# Patient Record
Sex: FEMALE | Race: WHITE | Employment: PART TIME | URBAN - METROPOLITAN AREA
[De-identification: names, ages, dates, MRNs, and addresses within clinical notes are randomized per-mention and may not be internally consistent; named-entity substitution may affect disease eponyms.]

---

## 2017-01-03 ENCOUNTER — OFFICE VISIT (OUTPATIENT)
Dept: FAMILY MEDICINE CLINIC | Age: 62
End: 2017-01-03

## 2017-01-03 VITALS
DIASTOLIC BLOOD PRESSURE: 76 MMHG | TEMPERATURE: 97.6 F | BODY MASS INDEX: 27.42 KG/M2 | HEIGHT: 62 IN | WEIGHT: 149 LBS | SYSTOLIC BLOOD PRESSURE: 122 MMHG | HEART RATE: 66 BPM

## 2017-01-03 DIAGNOSIS — J01.80 OTHER ACUTE SINUSITIS: Primary | ICD-10-CM

## 2017-01-03 DIAGNOSIS — J20.9 ACUTE BRONCHITIS, UNSPECIFIED ORGANISM: ICD-10-CM

## 2017-01-03 PROCEDURE — 99213 OFFICE O/P EST LOW 20 MIN: CPT | Performed by: FAMILY MEDICINE

## 2017-01-03 RX ORDER — DEXTROMETHORPHAN HYDROBROMIDE AND PROMETHAZINE HYDROCHLORIDE 15; 6.25 MG/5ML; MG/5ML
5 SYRUP ORAL 4 TIMES DAILY PRN
Qty: 150 ML | Refills: 0 | Status: SHIPPED | OUTPATIENT
Start: 2017-01-03 | End: 2017-01-10

## 2017-01-03 RX ORDER — LEVOFLOXACIN 500 MG/1
500 TABLET, FILM COATED ORAL DAILY
Qty: 10 TABLET | Refills: 0 | Status: SHIPPED | OUTPATIENT
Start: 2017-01-03 | End: 2017-01-13

## 2017-01-03 ASSESSMENT — ENCOUNTER SYMPTOMS: ABDOMINAL PAIN: 0

## 2017-03-06 ENCOUNTER — OFFICE VISIT (OUTPATIENT)
Dept: FAMILY MEDICINE CLINIC | Age: 62
End: 2017-03-06

## 2017-03-06 VITALS
OXYGEN SATURATION: 98 % | RESPIRATION RATE: 20 BRPM | TEMPERATURE: 97.8 F | DIASTOLIC BLOOD PRESSURE: 80 MMHG | HEIGHT: 62 IN | BODY MASS INDEX: 27.42 KG/M2 | HEART RATE: 69 BPM | SYSTOLIC BLOOD PRESSURE: 122 MMHG | WEIGHT: 149 LBS

## 2017-03-06 DIAGNOSIS — R10.9 RIGHT FLANK PAIN: ICD-10-CM

## 2017-03-06 DIAGNOSIS — R07.89 RIGHT-SIDED CHEST WALL PAIN: Primary | ICD-10-CM

## 2017-03-06 LAB
BILIRUBIN, POC: NORMAL
BLOOD URINE, POC: NORMAL
CLARITY, POC: CLEAR
COLOR, POC: NORMAL
GLUCOSE URINE, POC: NORMAL
KETONES, POC: NORMAL
LEUKOCYTE EST, POC: NORMAL
NITRITE, POC: NORMAL
PH, POC: 6
PROTEIN, POC: NORMAL
SPECIFIC GRAVITY, POC: 1.02
UROBILINOGEN, POC: NORMAL

## 2017-03-06 PROCEDURE — 3017F COLORECTAL CA SCREEN DOC REV: CPT | Performed by: FAMILY MEDICINE

## 2017-03-06 PROCEDURE — 4004F PT TOBACCO SCREEN RCVD TLK: CPT | Performed by: FAMILY MEDICINE

## 2017-03-06 PROCEDURE — G8484 FLU IMMUNIZE NO ADMIN: HCPCS | Performed by: FAMILY MEDICINE

## 2017-03-06 PROCEDURE — 81002 URINALYSIS NONAUTO W/O SCOPE: CPT | Performed by: FAMILY MEDICINE

## 2017-03-06 PROCEDURE — 99213 OFFICE O/P EST LOW 20 MIN: CPT | Performed by: FAMILY MEDICINE

## 2017-03-06 PROCEDURE — G8419 CALC BMI OUT NRM PARAM NOF/U: HCPCS | Performed by: FAMILY MEDICINE

## 2017-03-06 PROCEDURE — 3014F SCREEN MAMMO DOC REV: CPT | Performed by: FAMILY MEDICINE

## 2017-03-06 PROCEDURE — G8427 DOCREV CUR MEDS BY ELIG CLIN: HCPCS | Performed by: FAMILY MEDICINE

## 2017-03-06 RX ORDER — TRAMADOL HYDROCHLORIDE 50 MG/1
50 TABLET ORAL EVERY 6 HOURS PRN
Qty: 28 TABLET | Refills: 0 | Status: SHIPPED | OUTPATIENT
Start: 2017-03-06 | End: 2017-03-13

## 2017-03-06 RX ORDER — NAPROXEN 500 MG/1
500 TABLET ORAL 2 TIMES DAILY PRN
COMMUNITY
End: 2019-07-17

## 2017-03-06 ASSESSMENT — ENCOUNTER SYMPTOMS: ABDOMINAL PAIN: 0

## 2017-05-25 ENCOUNTER — TELEPHONE (OUTPATIENT)
Dept: FAMILY MEDICINE CLINIC | Age: 62
End: 2017-05-25

## 2017-05-25 DIAGNOSIS — Z12.31 ENCOUNTER FOR SCREENING MAMMOGRAM FOR BREAST CANCER: Primary | ICD-10-CM

## 2017-06-21 ENCOUNTER — HOSPITAL ENCOUNTER (OUTPATIENT)
Dept: WOMENS IMAGING | Age: 62
Discharge: HOME | End: 2017-06-21
Payer: COMMERCIAL

## 2017-06-21 DIAGNOSIS — Z12.31 ENCOUNTER FOR SCREENING MAMMOGRAM FOR BREAST CANCER: ICD-10-CM

## 2017-06-21 PROCEDURE — G0202 SCR MAMMO BI INCL CAD: HCPCS

## 2017-07-10 RX ORDER — LEVOTHYROXINE SODIUM 0.1 MG/1
TABLET ORAL
Qty: 30 TABLET | Refills: 0 | Status: SHIPPED | OUTPATIENT
Start: 2017-07-10 | End: 2017-08-16 | Stop reason: SDUPTHER

## 2017-08-15 RX ORDER — SERTRALINE HYDROCHLORIDE 100 MG/1
TABLET, FILM COATED ORAL
Qty: 30 TABLET | Refills: 0 | Status: SHIPPED | OUTPATIENT
Start: 2017-08-15 | End: 2017-10-02

## 2017-08-16 ENCOUNTER — OFFICE VISIT (OUTPATIENT)
Dept: FAMILY MEDICINE CLINIC | Age: 62
End: 2017-08-16

## 2017-08-16 VITALS
TEMPERATURE: 98 F | BODY MASS INDEX: 25.58 KG/M2 | SYSTOLIC BLOOD PRESSURE: 126 MMHG | DIASTOLIC BLOOD PRESSURE: 70 MMHG | HEART RATE: 82 BPM | HEIGHT: 62 IN | WEIGHT: 139 LBS

## 2017-08-16 DIAGNOSIS — F32.A DEPRESSION, UNSPECIFIED DEPRESSION TYPE: Primary | ICD-10-CM

## 2017-08-16 DIAGNOSIS — E78.2 MIXED HYPERLIPIDEMIA: ICD-10-CM

## 2017-08-16 DIAGNOSIS — E03.9 ACQUIRED HYPOTHYROIDISM: ICD-10-CM

## 2017-08-16 PROCEDURE — G8419 CALC BMI OUT NRM PARAM NOF/U: HCPCS | Performed by: FAMILY MEDICINE

## 2017-08-16 PROCEDURE — 3017F COLORECTAL CA SCREEN DOC REV: CPT | Performed by: FAMILY MEDICINE

## 2017-08-16 PROCEDURE — G8427 DOCREV CUR MEDS BY ELIG CLIN: HCPCS | Performed by: FAMILY MEDICINE

## 2017-08-16 PROCEDURE — 3014F SCREEN MAMMO DOC REV: CPT | Performed by: FAMILY MEDICINE

## 2017-08-16 PROCEDURE — 4004F PT TOBACCO SCREEN RCVD TLK: CPT | Performed by: FAMILY MEDICINE

## 2017-08-16 PROCEDURE — 99214 OFFICE O/P EST MOD 30 MIN: CPT | Performed by: FAMILY MEDICINE

## 2017-08-16 RX ORDER — TRAZODONE HYDROCHLORIDE 50 MG/1
TABLET ORAL
Qty: 30 TABLET | Refills: 2 | Status: SHIPPED | OUTPATIENT
Start: 2017-08-16 | End: 2017-12-08 | Stop reason: SDUPTHER

## 2017-08-16 RX ORDER — LEVOTHYROXINE SODIUM 0.1 MG/1
TABLET ORAL
Qty: 30 TABLET | Refills: 2 | Status: SHIPPED | OUTPATIENT
Start: 2017-08-16 | End: 2017-11-12 | Stop reason: SDUPTHER

## 2017-08-16 ASSESSMENT — ENCOUNTER SYMPTOMS
SHORTNESS OF BREATH: 0
ABDOMINAL PAIN: 0

## 2017-09-21 ENCOUNTER — HOSPITAL ENCOUNTER (EMERGENCY)
Age: 62
Discharge: HOME | End: 2017-09-21
Payer: COMMERCIAL

## 2017-09-21 VITALS
HEIGHT: 63 IN | TEMPERATURE: 98.2 F | OXYGEN SATURATION: 99 % | WEIGHT: 142 LBS | BODY MASS INDEX: 25.16 KG/M2 | SYSTOLIC BLOOD PRESSURE: 125 MMHG | RESPIRATION RATE: 18 BRPM | DIASTOLIC BLOOD PRESSURE: 81 MMHG | HEART RATE: 69 BPM

## 2017-09-21 DIAGNOSIS — Z91.030 BEE STING ALLERGY: Primary | ICD-10-CM

## 2017-09-21 PROCEDURE — 2500000003 HC RX 250 WO HCPCS: Performed by: PHYSICIAN ASSISTANT

## 2017-09-21 PROCEDURE — 99283 EMERGENCY DEPT VISIT LOW MDM: CPT

## 2017-09-21 PROCEDURE — 6370000000 HC RX 637 (ALT 250 FOR IP): Performed by: PHYSICIAN ASSISTANT

## 2017-09-21 PROCEDURE — 96374 THER/PROPH/DIAG INJ IV PUSH: CPT

## 2017-09-21 PROCEDURE — 6360000002 HC RX W HCPCS: Performed by: PHYSICIAN ASSISTANT

## 2017-09-21 PROCEDURE — 96375 TX/PRO/DX INJ NEW DRUG ADDON: CPT

## 2017-09-21 PROCEDURE — 2580000003 HC RX 258: Performed by: PHYSICIAN ASSISTANT

## 2017-09-21 PROCEDURE — S0028 INJECTION, FAMOTIDINE, 20 MG: HCPCS | Performed by: PHYSICIAN ASSISTANT

## 2017-09-21 RX ORDER — SODIUM CHLORIDE 9 MG/ML
INJECTION, SOLUTION INTRAVENOUS CONTINUOUS
Status: DISCONTINUED | OUTPATIENT
Start: 2017-09-21 | End: 2017-09-21 | Stop reason: HOSPADM

## 2017-09-21 RX ORDER — DIPHENHYDRAMINE HYDROCHLORIDE 50 MG/ML
25 INJECTION INTRAMUSCULAR; INTRAVENOUS ONCE
Status: COMPLETED | OUTPATIENT
Start: 2017-09-21 | End: 2017-09-21

## 2017-09-21 RX ORDER — METHYLPREDNISOLONE SODIUM SUCCINATE 125 MG/2ML
125 INJECTION, POWDER, LYOPHILIZED, FOR SOLUTION INTRAMUSCULAR; INTRAVENOUS ONCE
Status: COMPLETED | OUTPATIENT
Start: 2017-09-21 | End: 2017-09-21

## 2017-09-21 RX ORDER — LORATADINE 10 MG/1
10 TABLET ORAL DAILY
Qty: 5 TABLET | Refills: 0 | Status: SHIPPED | OUTPATIENT
Start: 2017-09-21 | End: 2017-09-26

## 2017-09-21 RX ORDER — PREDNISONE 20 MG/1
20 TABLET ORAL DAILY
Qty: 5 TABLET | Refills: 0 | Status: SHIPPED | OUTPATIENT
Start: 2017-09-21 | End: 2017-09-26

## 2017-09-21 RX ORDER — CETIRIZINE HYDROCHLORIDE 10 MG/1
10 TABLET ORAL ONCE
Status: COMPLETED | OUTPATIENT
Start: 2017-09-21 | End: 2017-09-21

## 2017-09-21 RX ORDER — FAMOTIDINE 20 MG/1
20 TABLET, FILM COATED ORAL 2 TIMES DAILY
Qty: 5 TABLET | Refills: 0 | Status: SHIPPED | OUTPATIENT
Start: 2017-09-21 | End: 2018-02-06

## 2017-09-21 RX ADMIN — METHYLPREDNISOLONE SODIUM SUCCINATE 125 MG: 125 INJECTION, POWDER, FOR SOLUTION INTRAMUSCULAR; INTRAVENOUS at 15:31

## 2017-09-21 RX ADMIN — CETIRIZINE HYDROCHLORIDE 10 MG: 10 TABLET, FILM COATED ORAL at 17:04

## 2017-09-21 RX ADMIN — FAMOTIDINE 20 MG: 10 INJECTION, SOLUTION INTRAVENOUS at 15:31

## 2017-09-21 RX ADMIN — DIPHENHYDRAMINE HYDROCHLORIDE 25 MG: 50 INJECTION, SOLUTION INTRAMUSCULAR; INTRAVENOUS at 15:31

## 2017-09-21 RX ADMIN — SODIUM CHLORIDE: 9 INJECTION, SOLUTION INTRAVENOUS at 15:31

## 2017-09-21 ASSESSMENT — PAIN SCALES - GENERAL: PAINLEVEL_OUTOF10: 6

## 2017-09-21 ASSESSMENT — PAIN DESCRIPTION - DESCRIPTORS: DESCRIPTORS: ACHING

## 2017-09-21 ASSESSMENT — ENCOUNTER SYMPTOMS
RHINORRHEA: 0
NAUSEA: 0
EYE DISCHARGE: 0
CONSTIPATION: 0
DIARRHEA: 0
COLOR CHANGE: 0
SORE THROAT: 0
STRIDOR: 0
COUGH: 0
CHOKING: 0
VOMITING: 0
ABDOMINAL PAIN: 0
ABDOMINAL DISTENTION: 0
WHEEZING: 0
SHORTNESS OF BREATH: 0
BACK PAIN: 0

## 2017-09-21 ASSESSMENT — PAIN DESCRIPTION - FREQUENCY: FREQUENCY: CONTINUOUS

## 2017-09-21 ASSESSMENT — PAIN DESCRIPTION - ORIENTATION: ORIENTATION: OTHER (COMMENT)

## 2017-10-02 RX ORDER — SERTRALINE HYDROCHLORIDE 100 MG/1
TABLET, FILM COATED ORAL
Qty: 30 TABLET | Refills: 0 | Status: SHIPPED | OUTPATIENT
Start: 2017-10-02 | End: 2017-11-01 | Stop reason: SDUPTHER

## 2017-10-10 RX ORDER — ATORVASTATIN CALCIUM 10 MG/1
TABLET, FILM COATED ORAL
Qty: 30 TABLET | Refills: 0 | Status: SHIPPED | OUTPATIENT
Start: 2017-10-10 | End: 2017-12-08 | Stop reason: SDUPTHER

## 2017-11-01 RX ORDER — SERTRALINE HYDROCHLORIDE 100 MG/1
TABLET, FILM COATED ORAL
Qty: 30 TABLET | Refills: 0 | Status: SHIPPED | OUTPATIENT
Start: 2017-11-01 | End: 2017-12-08 | Stop reason: SDUPTHER

## 2017-12-08 RX ORDER — ATORVASTATIN CALCIUM 10 MG/1
TABLET, FILM COATED ORAL
Qty: 30 TABLET | Refills: 0 | Status: SHIPPED | OUTPATIENT
Start: 2017-12-08 | End: 2018-01-11 | Stop reason: SDUPTHER

## 2017-12-08 RX ORDER — SERTRALINE HYDROCHLORIDE 100 MG/1
TABLET, FILM COATED ORAL
Qty: 30 TABLET | Refills: 0 | Status: SHIPPED | OUTPATIENT
Start: 2017-12-08 | End: 2017-12-28 | Stop reason: SDUPTHER

## 2017-12-08 RX ORDER — TRAZODONE HYDROCHLORIDE 50 MG/1
TABLET ORAL
Qty: 30 TABLET | Refills: 2 | Status: SHIPPED | OUTPATIENT
Start: 2017-12-08 | End: 2018-02-06 | Stop reason: SDUPTHER

## 2017-12-08 RX ORDER — LEVOTHYROXINE SODIUM 0.1 MG/1
TABLET ORAL
Qty: 30 TABLET | Refills: 0 | Status: SHIPPED | OUTPATIENT
Start: 2017-12-08 | End: 2018-02-05 | Stop reason: SDUPTHER

## 2017-12-18 DIAGNOSIS — E03.9 ACQUIRED HYPOTHYROIDISM: ICD-10-CM

## 2017-12-18 DIAGNOSIS — E78.2 MIXED HYPERLIPIDEMIA: ICD-10-CM

## 2017-12-18 LAB
ALBUMIN SERPL-MCNC: 4.4 G/DL (ref 3.9–4.9)
ALP BLD-CCNC: 80 U/L (ref 40–130)
ALT SERPL-CCNC: 14 U/L (ref 0–33)
ANION GAP SERPL CALCULATED.3IONS-SCNC: 17 MEQ/L (ref 7–13)
AST SERPL-CCNC: 15 U/L (ref 0–35)
BASOPHILS ABSOLUTE: 0 K/UL (ref 0–0.2)
BASOPHILS RELATIVE PERCENT: 0.5 %
BILIRUB SERPL-MCNC: 0.4 MG/DL (ref 0–1.2)
BUN BLDV-MCNC: 14 MG/DL (ref 8–23)
CALCIUM SERPL-MCNC: 9 MG/DL (ref 8.6–10.2)
CHLORIDE BLD-SCNC: 102 MEQ/L (ref 98–107)
CHOLESTEROL, TOTAL: 173 MG/DL (ref 0–199)
CO2: 25 MEQ/L (ref 22–29)
CREAT SERPL-MCNC: 0.54 MG/DL (ref 0.5–0.9)
EOSINOPHILS ABSOLUTE: 0 K/UL (ref 0–0.7)
EOSINOPHILS RELATIVE PERCENT: 0.4 %
GFR AFRICAN AMERICAN: >60
GFR NON-AFRICAN AMERICAN: >60
GLOBULIN: 2.2 G/DL (ref 2.3–3.5)
GLUCOSE BLD-MCNC: 71 MG/DL (ref 74–109)
HCT VFR BLD CALC: 40.1 % (ref 37–47)
HDLC SERPL-MCNC: 53 MG/DL (ref 40–59)
HEMOGLOBIN: 13.3 G/DL (ref 12–16)
LDL CHOLESTEROL CALCULATED: 99 MG/DL (ref 0–129)
LYMPHOCYTES ABSOLUTE: 2.9 K/UL (ref 1–4.8)
LYMPHOCYTES RELATIVE PERCENT: 37.9 %
MCH RBC QN AUTO: 32.5 PG (ref 27–31.3)
MCHC RBC AUTO-ENTMCNC: 33.3 % (ref 33–37)
MCV RBC AUTO: 97.7 FL (ref 82–100)
MONOCYTES ABSOLUTE: 0.4 K/UL (ref 0.2–0.8)
MONOCYTES RELATIVE PERCENT: 4.9 %
NEUTROPHILS ABSOLUTE: 4.3 K/UL (ref 1.4–6.5)
NEUTROPHILS RELATIVE PERCENT: 56.3 %
PDW BLD-RTO: 13.8 % (ref 11.5–14.5)
PLATELET # BLD: 226 K/UL (ref 130–400)
POTASSIUM SERPL-SCNC: 4.2 MEQ/L (ref 3.5–5.1)
RBC # BLD: 4.1 M/UL (ref 4.2–5.4)
SODIUM BLD-SCNC: 144 MEQ/L (ref 132–144)
TOTAL PROTEIN: 6.6 G/DL (ref 6.4–8.1)
TRIGL SERPL-MCNC: 105 MG/DL (ref 0–200)
TSH SERPL DL<=0.05 MIU/L-ACNC: 0.36 UIU/ML (ref 0.27–4.2)
WBC # BLD: 7.7 K/UL (ref 4.8–10.8)

## 2018-01-12 RX ORDER — ATORVASTATIN CALCIUM 10 MG/1
TABLET, FILM COATED ORAL
Qty: 30 TABLET | Refills: 0 | Status: SHIPPED | OUTPATIENT
Start: 2018-01-12 | End: 2018-02-06 | Stop reason: SDUPTHER

## 2018-02-05 RX ORDER — LEVOTHYROXINE SODIUM 0.1 MG/1
TABLET ORAL
Qty: 30 TABLET | Refills: 0 | Status: SHIPPED | OUTPATIENT
Start: 2018-02-05 | End: 2018-02-06 | Stop reason: SDUPTHER

## 2018-02-05 NOTE — TELEPHONE ENCOUNTER
Patient kind of upset that you have refused her medication yet again and no one has called to notify her that she needed an appt she had her lab work done in December but has heard nothing now she has been out of her Levothyroxine and Sertraline for over a week  I was able to schedule her an appt for tomorrow 2/6/18 but she was wondering if you could send her mediation to the pharmacy please advise

## 2018-02-06 ENCOUNTER — OFFICE VISIT (OUTPATIENT)
Dept: FAMILY MEDICINE CLINIC | Age: 63
End: 2018-02-06
Payer: COMMERCIAL

## 2018-02-06 VITALS
RESPIRATION RATE: 18 BRPM | HEART RATE: 95 BPM | OXYGEN SATURATION: 98 % | HEIGHT: 63 IN | DIASTOLIC BLOOD PRESSURE: 72 MMHG | SYSTOLIC BLOOD PRESSURE: 118 MMHG | TEMPERATURE: 98.5 F | WEIGHT: 146.6 LBS | BODY MASS INDEX: 25.98 KG/M2

## 2018-02-06 DIAGNOSIS — E78.2 MIXED HYPERLIPIDEMIA: ICD-10-CM

## 2018-02-06 DIAGNOSIS — F32.A DEPRESSION, UNSPECIFIED DEPRESSION TYPE: Primary | ICD-10-CM

## 2018-02-06 DIAGNOSIS — E03.9 HYPOTHYROIDISM, UNSPECIFIED TYPE: ICD-10-CM

## 2018-02-06 PROCEDURE — 4004F PT TOBACCO SCREEN RCVD TLK: CPT | Performed by: FAMILY MEDICINE

## 2018-02-06 PROCEDURE — 3017F COLORECTAL CA SCREEN DOC REV: CPT | Performed by: FAMILY MEDICINE

## 2018-02-06 PROCEDURE — G8484 FLU IMMUNIZE NO ADMIN: HCPCS | Performed by: FAMILY MEDICINE

## 2018-02-06 PROCEDURE — 99214 OFFICE O/P EST MOD 30 MIN: CPT | Performed by: FAMILY MEDICINE

## 2018-02-06 PROCEDURE — G8427 DOCREV CUR MEDS BY ELIG CLIN: HCPCS | Performed by: FAMILY MEDICINE

## 2018-02-06 PROCEDURE — G8419 CALC BMI OUT NRM PARAM NOF/U: HCPCS | Performed by: FAMILY MEDICINE

## 2018-02-06 PROCEDURE — 3014F SCREEN MAMMO DOC REV: CPT | Performed by: FAMILY MEDICINE

## 2018-02-06 RX ORDER — SERTRALINE HYDROCHLORIDE 100 MG/1
TABLET, FILM COATED ORAL
Qty: 30 TABLET | Refills: 5 | Status: SHIPPED | OUTPATIENT
Start: 2018-02-06 | End: 2018-07-25

## 2018-02-06 RX ORDER — TRAZODONE HYDROCHLORIDE 50 MG/1
TABLET ORAL
Qty: 30 TABLET | Refills: 5 | Status: SHIPPED | OUTPATIENT
Start: 2018-02-06 | End: 2018-07-25 | Stop reason: SDUPTHER

## 2018-02-06 RX ORDER — LEVOTHYROXINE SODIUM 0.1 MG/1
TABLET ORAL
Qty: 30 TABLET | Refills: 5 | Status: SHIPPED | OUTPATIENT
Start: 2018-02-06 | End: 2018-07-25 | Stop reason: SDUPTHER

## 2018-02-06 RX ORDER — ATORVASTATIN CALCIUM 10 MG/1
TABLET, FILM COATED ORAL
Qty: 30 TABLET | Refills: 5 | Status: SHIPPED | OUTPATIENT
Start: 2018-02-06 | End: 2018-07-25 | Stop reason: SDUPTHER

## 2018-02-06 ASSESSMENT — ENCOUNTER SYMPTOMS
ABDOMINAL PAIN: 0
SHORTNESS OF BREATH: 0

## 2018-02-06 NOTE — PROGRESS NOTES
Subjective:      Patient ID: Wen Sears is a 58 y.o. female    Hyperlipidemia   This is a chronic problem. The problem is controlled. Pertinent negatives include no chest pain or shortness of breath. Current antihyperlipidemic treatment includes statins. The current treatment provides moderate improvement of lipids. Compliance problems include adherence to exercise and adherence to diet. Risk factors for coronary artery disease include stress, a sedentary lifestyle, post-menopausal and dyslipidemia. Mental Health Problem     Additional symptoms of the illness do not include appetite change, unexpected weight change or abdominal pain. Here in follow up for hypothyroidism and depression and lipids. No missed doses of medications. zoloft working well for depression    Review of Systems   Constitutional: Negative for activity change, appetite change and unexpected weight change. Respiratory: Negative for shortness of breath. Cardiovascular: Negative for chest pain. Gastrointestinal: Negative for abdominal pain. Skin: Negative for rash. Psychiatric/Behavioral: Negative for decreased concentration. The patient is not nervous/anxious.       Reviewed allergy, medical, social, surgical, family and med list changes and updated   Files--reviewed blood work which was acceptable   Social History     Social History    Marital status:      Spouse name: N/A    Number of children: N/A    Years of education: N/A     Social History Main Topics    Smoking status: Current Every Day Smoker     Packs/day: 0.20     Years: 10.00     Types: Cigarettes    Smokeless tobacco: Never Used    Alcohol use No    Drug use: No    Sexual activity: Not Asked     Other Topics Concern    None     Social History Narrative    None     Current Outpatient Prescriptions   Medication Sig Dispense Refill    levothyroxine (SYNTHROID) 100 MCG tablet take 1 tablet by mouth once daily 30 tablet 0    atorvastatin (LIPITOR) 10 MG tablet take 1 tablet by mouth once daily 30 tablet 0    sertraline (ZOLOFT) 100 MG tablet take 1 tablet by mouth once daily 30 tablet 0    traZODone (DESYREL) 50 MG tablet take 1 tablet by mouth every evening 30 tablet 2    naproxen (NAPROSYN) 500 MG tablet Take 500 mg by mouth 2 times daily (with meals)      aspirin 81 MG EC tablet Take 81 mg by mouth daily.  vitamin E 1000 UNIT capsule Take 1,000 Units by mouth daily.  vitamin D (CHOLECALCIFEROL) 400 UNIT TABS tablet Take 400 Units by mouth 3 times daily. No current facility-administered medications for this visit. Family History   Problem Relation Age of Onset    Cancer Father      Past Medical History:   Diagnosis Date    Depression     Hyperlipidemia     Hypothyroidism      Objective:   /72 (Site: Left Arm, Position: Sitting, Cuff Size: Large Adult)   Pulse 95   Temp 98.5 °F (36.9 °C) (Tympanic)   Resp 18   Ht 5' 3\" (1.6 m)   Wt 146 lb 9.6 oz (66.5 kg)   LMP  (LMP Unknown)   SpO2 98%   Breastfeeding? No   BMI 25.97 kg/m²     Physical Exam  Neck:no carotid bruits. No masses. No adenopathy. No thyroid asymmetry. Lungs:clear and equal breath sounds. No wheezes or rales. Heart:rate reg. No murmur. No gallops   Pulses:Radials 2+ equal               Poster tib 1+ equal  Extremities:no edema in either leg  Gen: In no acute distress  Abdomen; B.S present. Soft  Non tender. No hepatosplenomegaly. No masses   Patient with appropriate affect. Alert    Thought content appropriate  Good eye contact    Assessment:         1. Depression, unspecified depression type  sertraline (ZOLOFT) 100 MG tablet    traZODone (DESYREL) 50 MG tablet   2. Hypothyroidism, unspecified type  atorvastatin (LIPITOR) 10 MG tablet    levothyroxine (SYNTHROID) 100 MCG tablet   3.  Mixed hyperlipidemia        Plan:    continue current medications  Orders Placed This Encounter   Medications    atorvastatin (LIPITOR) 10 MG tablet     Sig: take 1 tablet by mouth once daily     Dispense:  30 tablet     Refill:  5    sertraline (ZOLOFT) 100 MG tablet     Sig: take 1 tablet by mouth once daily     Dispense:  30 tablet     Refill:  5    traZODone (DESYREL) 50 MG tablet     Sig: take 1 tablet by mouth every evening     Dispense:  30 tablet     Refill:  5    levothyroxine (SYNTHROID) 100 MCG tablet     Sig: take 1 tablet by mouth once daily     Dispense:  30 tablet     Refill:  5   non fasting blood work in 6 months and f/u after done

## 2018-02-08 RX ORDER — SERTRALINE HYDROCHLORIDE 100 MG/1
TABLET, FILM COATED ORAL
Qty: 30 TABLET | Refills: 0 | Status: SHIPPED | OUTPATIENT
Start: 2018-02-08 | End: 2019-01-18

## 2018-06-29 DIAGNOSIS — E03.9 HYPOTHYROIDISM, UNSPECIFIED TYPE: ICD-10-CM

## 2018-06-29 LAB — TSH SERPL DL<=0.05 MIU/L-ACNC: 1.52 UIU/ML (ref 0.27–4.2)

## 2018-07-25 ENCOUNTER — OFFICE VISIT (OUTPATIENT)
Dept: FAMILY MEDICINE CLINIC | Age: 63
End: 2018-07-25
Payer: COMMERCIAL

## 2018-07-25 VITALS
WEIGHT: 150.2 LBS | SYSTOLIC BLOOD PRESSURE: 122 MMHG | BODY MASS INDEX: 26.61 KG/M2 | HEIGHT: 63 IN | TEMPERATURE: 97.1 F | RESPIRATION RATE: 12 BRPM | OXYGEN SATURATION: 100 % | DIASTOLIC BLOOD PRESSURE: 86 MMHG | HEART RATE: 65 BPM

## 2018-07-25 DIAGNOSIS — F32.A DEPRESSION, UNSPECIFIED DEPRESSION TYPE: ICD-10-CM

## 2018-07-25 DIAGNOSIS — E78.2 MIXED HYPERLIPIDEMIA: ICD-10-CM

## 2018-07-25 DIAGNOSIS — Z92.89 HX OF SCREENING MAMMOGRAPHY: ICD-10-CM

## 2018-07-25 DIAGNOSIS — E03.9 HYPOTHYROIDISM, UNSPECIFIED TYPE: Primary | ICD-10-CM

## 2018-07-25 PROCEDURE — 4004F PT TOBACCO SCREEN RCVD TLK: CPT | Performed by: FAMILY MEDICINE

## 2018-07-25 PROCEDURE — 99213 OFFICE O/P EST LOW 20 MIN: CPT | Performed by: FAMILY MEDICINE

## 2018-07-25 PROCEDURE — G8419 CALC BMI OUT NRM PARAM NOF/U: HCPCS | Performed by: FAMILY MEDICINE

## 2018-07-25 PROCEDURE — G8427 DOCREV CUR MEDS BY ELIG CLIN: HCPCS | Performed by: FAMILY MEDICINE

## 2018-07-25 PROCEDURE — 3017F COLORECTAL CA SCREEN DOC REV: CPT | Performed by: FAMILY MEDICINE

## 2018-07-25 RX ORDER — ATORVASTATIN CALCIUM 10 MG/1
TABLET, FILM COATED ORAL
Qty: 30 TABLET | Refills: 5 | Status: SHIPPED | OUTPATIENT
Start: 2018-07-25 | End: 2019-03-21 | Stop reason: SDUPTHER

## 2018-07-25 RX ORDER — SERTRALINE HYDROCHLORIDE 100 MG/1
100 TABLET, FILM COATED ORAL DAILY
Qty: 30 TABLET | Refills: 5 | Status: SHIPPED | OUTPATIENT
Start: 2018-07-25 | End: 2019-03-21 | Stop reason: SDUPTHER

## 2018-07-25 RX ORDER — TRAZODONE HYDROCHLORIDE 50 MG/1
TABLET ORAL
Qty: 30 TABLET | Refills: 5 | Status: SHIPPED | OUTPATIENT
Start: 2018-07-25 | End: 2019-07-17

## 2018-07-25 RX ORDER — LEVOTHYROXINE SODIUM 0.1 MG/1
TABLET ORAL
Qty: 30 TABLET | Refills: 5 | Status: SHIPPED | OUTPATIENT
Start: 2018-07-25 | End: 2019-03-21 | Stop reason: SDUPTHER

## 2018-07-25 ASSESSMENT — ENCOUNTER SYMPTOMS
SHORTNESS OF BREATH: 0
ABDOMINAL PAIN: 0

## 2018-07-25 NOTE — PROGRESS NOTES
Medical History:   Diagnosis Date    Depression     Hyperlipidemia     Hypothyroidism      Objective:   /86   Pulse 65   Temp 97.1 °F (36.2 °C)   Resp 12   Ht 5' 3\" (1.6 m)   Wt 150 lb 3.2 oz (68.1 kg)   LMP  (LMP Unknown)   SpO2 100%   BMI 26.61 kg/m²     Physical Exam  Patient with appropriate affect. Alert    Thought content appropriate  Good eye contact  Not suicidal or homicidal  Gen:   NAD  Lungs: clear and equal breath sounds  Heart:   Rate reg. No murmur  Assessment:       Diagnosis Orders   1. Hypothyroidism, unspecified type  levothyroxine (SYNTHROID) 100 MCG tablet    atorvastatin (LIPITOR) 10 MG tablet   2. Depression, unspecified depression type  traZODone (DESYREL) 50 MG tablet   3. Mixed hyperlipidemia     4.  Hx of screening mammography  FELIX DIGITAL SCREEN W CAD BILATERAL     Plan:      Orders Placed This Encounter   Procedures    FELIX DIGITAL SCREEN W CAD BILATERAL     Standing Status:   Future     Standing Expiration Date:   9/25/2019     Order Specific Question:   Reason for exam:     Answer:   screening     Orders Placed This Encounter   Medications    levothyroxine (SYNTHROID) 100 MCG tablet     Sig: take 1 tablet by mouth once daily     Dispense:  30 tablet     Refill:  5    traZODone (DESYREL) 50 MG tablet     Sig: take 1 tablet by mouth every evening     Dispense:  30 tablet     Refill:  5    atorvastatin (LIPITOR) 10 MG tablet     Sig: take 1 tablet by mouth once daily     Dispense:  30 tablet     Refill:  5    sertraline (ZOLOFT) 100 MG tablet     Sig: Take 1 tablet by mouth daily     Dispense:  30 tablet     Refill:  5   f/u after fasting blood work in [facility]

## 2018-08-10 ENCOUNTER — HOSPITAL ENCOUNTER (OUTPATIENT)
Dept: WOMENS IMAGING | Age: 63
Discharge: HOME | End: 2018-08-12
Payer: COMMERCIAL

## 2018-08-10 DIAGNOSIS — Z92.89 HX OF SCREENING MAMMOGRAPHY: ICD-10-CM

## 2018-08-10 PROCEDURE — 77067 SCR MAMMO BI INCL CAD: CPT

## 2019-01-11 DIAGNOSIS — E03.9 HYPOTHYROIDISM, UNSPECIFIED TYPE: ICD-10-CM

## 2019-01-11 DIAGNOSIS — E78.2 MIXED HYPERLIPIDEMIA: ICD-10-CM

## 2019-01-11 LAB
ALBUMIN SERPL-MCNC: 4.6 G/DL (ref 3.9–4.9)
ALP BLD-CCNC: 81 U/L (ref 40–130)
ALT SERPL-CCNC: 15 U/L (ref 0–33)
ANION GAP SERPL CALCULATED.3IONS-SCNC: 13 MEQ/L (ref 7–13)
AST SERPL-CCNC: 19 U/L (ref 0–35)
BASOPHILS ABSOLUTE: 0 K/UL (ref 0–0.2)
BASOPHILS RELATIVE PERCENT: 0.5 %
BILIRUB SERPL-MCNC: 0.4 MG/DL (ref 0–1.2)
BUN BLDV-MCNC: 15 MG/DL (ref 8–23)
CALCIUM SERPL-MCNC: 9.3 MG/DL (ref 8.6–10.2)
CHLORIDE BLD-SCNC: 103 MEQ/L (ref 98–107)
CHOLESTEROL, TOTAL: 214 MG/DL (ref 0–199)
CO2: 24 MEQ/L (ref 22–29)
CREAT SERPL-MCNC: 0.64 MG/DL (ref 0.5–0.9)
EOSINOPHILS ABSOLUTE: 0 K/UL (ref 0–0.7)
EOSINOPHILS RELATIVE PERCENT: 0.3 %
GFR AFRICAN AMERICAN: >60
GFR NON-AFRICAN AMERICAN: >60
GLOBULIN: 2.7 G/DL (ref 2.3–3.5)
GLUCOSE BLD-MCNC: 86 MG/DL (ref 74–109)
HCT VFR BLD CALC: 42.9 % (ref 37–47)
HDLC SERPL-MCNC: 55 MG/DL (ref 40–59)
HEMOGLOBIN: 15 G/DL (ref 12–16)
LDL CHOLESTEROL CALCULATED: 141 MG/DL (ref 0–129)
LYMPHOCYTES ABSOLUTE: 2.5 K/UL (ref 1–4.8)
LYMPHOCYTES RELATIVE PERCENT: 27.6 %
MCH RBC QN AUTO: 34.8 PG (ref 27–31.3)
MCHC RBC AUTO-ENTMCNC: 35.1 % (ref 33–37)
MCV RBC AUTO: 99.1 FL (ref 82–100)
MONOCYTES ABSOLUTE: 0.5 K/UL (ref 0.2–0.8)
MONOCYTES RELATIVE PERCENT: 5.2 %
NEUTROPHILS ABSOLUTE: 6 K/UL (ref 1.4–6.5)
NEUTROPHILS RELATIVE PERCENT: 66.4 %
PDW BLD-RTO: 14.2 % (ref 11.5–14.5)
PLATELET # BLD: 221 K/UL (ref 130–400)
POTASSIUM SERPL-SCNC: 5.3 MEQ/L (ref 3.5–5.1)
RBC # BLD: 4.33 M/UL (ref 4.2–5.4)
SODIUM BLD-SCNC: 140 MEQ/L (ref 132–144)
TOTAL PROTEIN: 7.3 G/DL (ref 6.4–8.1)
TRIGL SERPL-MCNC: 90 MG/DL (ref 0–200)
TSH SERPL DL<=0.05 MIU/L-ACNC: 66.57 UIU/ML (ref 0.27–4.2)
WBC # BLD: 9 K/UL (ref 4.8–10.8)

## 2019-01-18 ENCOUNTER — OFFICE VISIT (OUTPATIENT)
Dept: FAMILY MEDICINE CLINIC | Age: 64
End: 2019-01-18

## 2019-01-18 VITALS
DIASTOLIC BLOOD PRESSURE: 78 MMHG | HEIGHT: 63 IN | BODY MASS INDEX: 28.6 KG/M2 | TEMPERATURE: 97.8 F | RESPIRATION RATE: 14 BRPM | SYSTOLIC BLOOD PRESSURE: 112 MMHG | OXYGEN SATURATION: 98 % | WEIGHT: 161.4 LBS | HEART RATE: 69 BPM

## 2019-01-18 DIAGNOSIS — E78.2 MIXED HYPERLIPIDEMIA: Primary | ICD-10-CM

## 2019-01-18 DIAGNOSIS — E03.9 HYPOTHYROIDISM, UNSPECIFIED TYPE: ICD-10-CM

## 2019-01-18 DIAGNOSIS — F51.04 CHRONIC INSOMNIA: ICD-10-CM

## 2019-01-18 DIAGNOSIS — F32.A DEPRESSION, UNSPECIFIED DEPRESSION TYPE: ICD-10-CM

## 2019-01-18 PROCEDURE — 99213 OFFICE O/P EST LOW 20 MIN: CPT | Performed by: FAMILY MEDICINE

## 2019-01-18 ASSESSMENT — ENCOUNTER SYMPTOMS
ABDOMINAL PAIN: 0
SHORTNESS OF BREATH: 0

## 2019-02-05 ENCOUNTER — TELEPHONE (OUTPATIENT)
Dept: UROLOGY | Age: 64
End: 2019-02-05

## 2019-02-07 ENCOUNTER — HOSPITAL ENCOUNTER (OUTPATIENT)
Dept: CT IMAGING | Age: 64
Discharge: HOME | End: 2019-02-09

## 2019-02-07 ENCOUNTER — OFFICE VISIT (OUTPATIENT)
Dept: FAMILY MEDICINE CLINIC | Age: 64
End: 2019-02-07

## 2019-02-07 VITALS
SYSTOLIC BLOOD PRESSURE: 128 MMHG | BODY MASS INDEX: 28.39 KG/M2 | HEART RATE: 67 BPM | OXYGEN SATURATION: 98 % | RESPIRATION RATE: 14 BRPM | TEMPERATURE: 97.5 F | HEIGHT: 63 IN | DIASTOLIC BLOOD PRESSURE: 86 MMHG | WEIGHT: 160.2 LBS

## 2019-02-07 DIAGNOSIS — R31.0 GROSS HEMATURIA: ICD-10-CM

## 2019-02-07 DIAGNOSIS — R10.31 ABDOMINAL PAIN, RIGHT LOWER QUADRANT: Primary | ICD-10-CM

## 2019-02-07 DIAGNOSIS — R10.31 ABDOMINAL PAIN, RIGHT LOWER QUADRANT: ICD-10-CM

## 2019-02-07 LAB
BILIRUBIN, POC: ABNORMAL
BLOOD URINE, POC: ABNORMAL
CLARITY, POC: CLEAR
COLOR, POC: YELLOW
GLUCOSE URINE, POC: ABNORMAL
KETONES, POC: ABNORMAL
LEUKOCYTE EST, POC: ABNORMAL
NITRITE, POC: ABNORMAL
PH, POC: 7
PROTEIN, POC: ABNORMAL
SPECIFIC GRAVITY, POC: 1.01
UROBILINOGEN, POC: ABNORMAL

## 2019-02-07 PROCEDURE — 99213 OFFICE O/P EST LOW 20 MIN: CPT | Performed by: FAMILY MEDICINE

## 2019-02-07 PROCEDURE — 81003 URINALYSIS AUTO W/O SCOPE: CPT | Performed by: FAMILY MEDICINE

## 2019-02-07 PROCEDURE — 74176 CT ABD & PELVIS W/O CONTRAST: CPT

## 2019-02-07 ASSESSMENT — ENCOUNTER SYMPTOMS
ABDOMINAL DISTENTION: 0
DIARRHEA: 0
SHORTNESS OF BREATH: 0

## 2019-02-08 DIAGNOSIS — N20.0 BILATERAL KIDNEY STONES: Primary | ICD-10-CM

## 2019-02-08 LAB — URINE CULTURE, ROUTINE: NORMAL

## 2019-02-11 ENCOUNTER — TELEPHONE (OUTPATIENT)
Dept: UROLOGY | Age: 64
End: 2019-02-11

## 2019-02-11 DIAGNOSIS — N20.1 URETERAL STONE: Primary | ICD-10-CM

## 2019-02-13 ENCOUNTER — OFFICE VISIT (OUTPATIENT)
Dept: UROLOGY | Age: 64
End: 2019-02-13

## 2019-02-13 ENCOUNTER — TELEPHONE (OUTPATIENT)
Dept: UROLOGY | Age: 64
End: 2019-02-13

## 2019-02-13 ENCOUNTER — HOSPITAL ENCOUNTER (OUTPATIENT)
Dept: GENERAL RADIOLOGY | Age: 64
Discharge: HOME | End: 2019-02-15

## 2019-02-13 VITALS
HEIGHT: 62 IN | WEIGHT: 140 LBS | SYSTOLIC BLOOD PRESSURE: 130 MMHG | BODY MASS INDEX: 25.76 KG/M2 | DIASTOLIC BLOOD PRESSURE: 76 MMHG | HEART RATE: 67 BPM

## 2019-02-13 DIAGNOSIS — N20.1 LEFT URETERAL STONE: ICD-10-CM

## 2019-02-13 DIAGNOSIS — N20.1 URETERAL STONE: ICD-10-CM

## 2019-02-13 DIAGNOSIS — N20.0 KIDNEY STONE: Primary | ICD-10-CM

## 2019-02-13 LAB
BILIRUBIN, POC: ABNORMAL
BLOOD URINE, POC: ABNORMAL
CLARITY, POC: CLEAR
COLOR, POC: YELLOW
GLUCOSE URINE, POC: ABNORMAL
KETONES, POC: ABNORMAL
LEUKOCYTE EST, POC: ABNORMAL
NITRITE, POC: ABNORMAL
PH, POC: 7
PROTEIN, POC: ABNORMAL
SPECIFIC GRAVITY, POC: 1.01
UROBILINOGEN, POC: 0.2

## 2019-02-13 PROCEDURE — 99204 OFFICE O/P NEW MOD 45 MIN: CPT | Performed by: UROLOGY

## 2019-02-13 PROCEDURE — 74018 RADEX ABDOMEN 1 VIEW: CPT

## 2019-02-13 PROCEDURE — 81003 URINALYSIS AUTO W/O SCOPE: CPT | Performed by: UROLOGY

## 2019-02-13 RX ORDER — OXYCODONE HYDROCHLORIDE AND ACETAMINOPHEN 5; 325 MG/1; MG/1
1 TABLET ORAL EVERY 6 HOURS PRN
Qty: 12 TABLET | Refills: 0 | Status: SHIPPED | OUTPATIENT
Start: 2019-02-13 | End: 2019-02-16

## 2019-02-13 RX ORDER — TAMSULOSIN HYDROCHLORIDE 0.4 MG/1
0.4 CAPSULE ORAL DAILY
Qty: 30 CAPSULE | Refills: 0 | Status: SHIPPED | OUTPATIENT
Start: 2019-02-13 | End: 2019-03-19

## 2019-02-13 ASSESSMENT — ENCOUNTER SYMPTOMS
GASTROINTESTINAL NEGATIVE: 1
NAUSEA: 0
ABDOMINAL DISTENTION: 0
BACK PAIN: 1
SHORTNESS OF BREATH: 0
ABDOMINAL PAIN: 0

## 2019-02-25 ENCOUNTER — TELEPHONE (OUTPATIENT)
Dept: UROLOGY | Age: 64
End: 2019-02-25

## 2019-03-19 ENCOUNTER — HOSPITAL ENCOUNTER (EMERGENCY)
Age: 64
Discharge: HOME | End: 2019-03-19

## 2019-03-19 ENCOUNTER — APPOINTMENT (OUTPATIENT)
Dept: CT IMAGING | Age: 64
End: 2019-03-19

## 2019-03-19 VITALS
RESPIRATION RATE: 16 BRPM | SYSTOLIC BLOOD PRESSURE: 133 MMHG | HEART RATE: 66 BPM | OXYGEN SATURATION: 96 % | WEIGHT: 145 LBS | TEMPERATURE: 97.4 F | DIASTOLIC BLOOD PRESSURE: 83 MMHG | HEIGHT: 64 IN | BODY MASS INDEX: 24.75 KG/M2

## 2019-03-19 DIAGNOSIS — N20.1 URETEROLITHIASIS: Primary | ICD-10-CM

## 2019-03-19 LAB
ALBUMIN SERPL-MCNC: 4.2 G/DL (ref 3.5–4.6)
ALP BLD-CCNC: 102 U/L (ref 40–130)
ALT SERPL-CCNC: 21 U/L (ref 0–33)
ANION GAP SERPL CALCULATED.3IONS-SCNC: 13 MEQ/L (ref 9–15)
AST SERPL-CCNC: 22 U/L (ref 0–35)
BASOPHILS ABSOLUTE: 0.1 K/UL (ref 0–0.2)
BASOPHILS RELATIVE PERCENT: 0.6 %
BILIRUB SERPL-MCNC: <0.2 MG/DL (ref 0.2–0.7)
BUN BLDV-MCNC: 16 MG/DL (ref 8–23)
CALCIUM SERPL-MCNC: 8.8 MG/DL (ref 8.5–9.9)
CHLORIDE BLD-SCNC: 110 MEQ/L (ref 95–107)
CO2: 21 MEQ/L (ref 20–31)
CREAT SERPL-MCNC: 0.64 MG/DL (ref 0.5–0.9)
EOSINOPHILS ABSOLUTE: 0 K/UL (ref 0–0.7)
EOSINOPHILS RELATIVE PERCENT: 0.3 %
GFR AFRICAN AMERICAN: >60
GFR NON-AFRICAN AMERICAN: >60
GLOBULIN: 2.5 G/DL (ref 2.3–3.5)
GLUCOSE BLD-MCNC: 102 MG/DL (ref 70–99)
HCT VFR BLD CALC: 38.8 % (ref 37–47)
HEMOGLOBIN: 13.4 G/DL (ref 12–16)
LIPASE: 26 U/L (ref 12–95)
LYMPHOCYTES ABSOLUTE: 1.6 K/UL (ref 1–4.8)
LYMPHOCYTES RELATIVE PERCENT: 16.8 %
MCH RBC QN AUTO: 32.9 PG (ref 27–31.3)
MCHC RBC AUTO-ENTMCNC: 34.6 % (ref 33–37)
MCV RBC AUTO: 95.2 FL (ref 82–100)
MONOCYTES ABSOLUTE: 0.6 K/UL (ref 0.2–0.8)
MONOCYTES RELATIVE PERCENT: 5.9 %
NEUTROPHILS ABSOLUTE: 7.4 K/UL (ref 1.4–6.5)
NEUTROPHILS RELATIVE PERCENT: 76.4 %
PDW BLD-RTO: 12.6 % (ref 11.5–14.5)
PLATELET # BLD: 228 K/UL (ref 130–400)
POTASSIUM SERPL-SCNC: 4 MEQ/L (ref 3.4–4.9)
RBC # BLD: 4.07 M/UL (ref 4.2–5.4)
SODIUM BLD-SCNC: 144 MEQ/L (ref 135–144)
TOTAL PROTEIN: 6.7 G/DL (ref 6.3–8)
WBC # BLD: 9.7 K/UL (ref 4.8–10.8)

## 2019-03-19 PROCEDURE — 74176 CT ABD & PELVIS W/O CONTRAST: CPT

## 2019-03-19 PROCEDURE — 36415 COLL VENOUS BLD VENIPUNCTURE: CPT

## 2019-03-19 PROCEDURE — 85025 COMPLETE CBC W/AUTO DIFF WBC: CPT

## 2019-03-19 PROCEDURE — 99284 EMERGENCY DEPT VISIT MOD MDM: CPT

## 2019-03-19 PROCEDURE — 6370000000 HC RX 637 (ALT 250 FOR IP): Performed by: NURSE PRACTITIONER

## 2019-03-19 PROCEDURE — 96374 THER/PROPH/DIAG INJ IV PUSH: CPT

## 2019-03-19 PROCEDURE — 2580000003 HC RX 258: Performed by: NURSE PRACTITIONER

## 2019-03-19 PROCEDURE — 6360000002 HC RX W HCPCS: Performed by: NURSE PRACTITIONER

## 2019-03-19 PROCEDURE — 80053 COMPREHEN METABOLIC PANEL: CPT

## 2019-03-19 PROCEDURE — 83690 ASSAY OF LIPASE: CPT

## 2019-03-19 RX ORDER — TAMSULOSIN HYDROCHLORIDE 0.4 MG/1
0.8 CAPSULE ORAL DAILY
Status: DISCONTINUED | OUTPATIENT
Start: 2019-03-19 | End: 2019-03-19 | Stop reason: HOSPADM

## 2019-03-19 RX ORDER — ONDANSETRON 2 MG/ML
4 INJECTION INTRAMUSCULAR; INTRAVENOUS EVERY 10 MIN PRN
Status: DISCONTINUED | OUTPATIENT
Start: 2019-03-19 | End: 2019-03-19 | Stop reason: HOSPADM

## 2019-03-19 RX ORDER — FENTANYL CITRATE 50 UG/ML
50 INJECTION, SOLUTION INTRAMUSCULAR; INTRAVENOUS EVERY 30 MIN PRN
Status: DISCONTINUED | OUTPATIENT
Start: 2019-03-19 | End: 2019-03-19 | Stop reason: HOSPADM

## 2019-03-19 RX ORDER — KETOROLAC TROMETHAMINE 30 MG/ML
30 INJECTION, SOLUTION INTRAMUSCULAR; INTRAVENOUS ONCE
Status: COMPLETED | OUTPATIENT
Start: 2019-03-19 | End: 2019-03-19

## 2019-03-19 RX ORDER — 0.9 % SODIUM CHLORIDE 0.9 %
1000 INTRAVENOUS SOLUTION INTRAVENOUS ONCE
Status: COMPLETED | OUTPATIENT
Start: 2019-03-19 | End: 2019-03-19

## 2019-03-19 RX ORDER — CIPROFLOXACIN 500 MG/1
500 TABLET, FILM COATED ORAL 2 TIMES DAILY
Qty: 14 TABLET | Refills: 0 | Status: SHIPPED | OUTPATIENT
Start: 2019-03-19 | End: 2019-03-26

## 2019-03-19 RX ORDER — TAMSULOSIN HYDROCHLORIDE 0.4 MG/1
0.4 CAPSULE ORAL DAILY
Qty: 10 CAPSULE | Refills: 0 | Status: SHIPPED | OUTPATIENT
Start: 2019-03-19 | End: 2019-07-17

## 2019-03-19 RX ORDER — OXYCODONE HYDROCHLORIDE AND ACETAMINOPHEN 5; 325 MG/1; MG/1
1 TABLET ORAL EVERY 6 HOURS PRN
Qty: 10 TABLET | Refills: 0 | Status: SHIPPED | OUTPATIENT
Start: 2019-03-19 | End: 2019-03-24

## 2019-03-19 RX ADMIN — TAMSULOSIN HYDROCHLORIDE 0.8 MG: 0.4 CAPSULE ORAL at 19:12

## 2019-03-19 RX ADMIN — KETOROLAC TROMETHAMINE 30 MG: 30 INJECTION, SOLUTION INTRAMUSCULAR; INTRAVENOUS at 17:26

## 2019-03-19 RX ADMIN — SODIUM CHLORIDE 1000 ML: 9 INJECTION, SOLUTION INTRAVENOUS at 17:26

## 2019-03-19 ASSESSMENT — ENCOUNTER SYMPTOMS
ABDOMINAL PAIN: 0
NAUSEA: 0
VOMITING: 0
COUGH: 0
PHOTOPHOBIA: 0
RHINORRHEA: 0
SHORTNESS OF BREATH: 0
EYE PAIN: 0
BACK PAIN: 0
SORE THROAT: 0
DIARRHEA: 0

## 2019-03-19 ASSESSMENT — PAIN DESCRIPTION - LOCATION
LOCATION: FLANK
LOCATION: FLANK

## 2019-03-19 ASSESSMENT — PAIN SCALES - GENERAL
PAINLEVEL_OUTOF10: 2
PAINLEVEL_OUTOF10: 10
PAINLEVEL_OUTOF10: 10

## 2019-03-19 ASSESSMENT — PAIN DESCRIPTION - PAIN TYPE: TYPE: ACUTE PAIN

## 2019-03-21 DIAGNOSIS — E03.9 HYPOTHYROIDISM, UNSPECIFIED TYPE: ICD-10-CM

## 2019-03-21 RX ORDER — SERTRALINE HYDROCHLORIDE 100 MG/1
TABLET, FILM COATED ORAL
Qty: 30 TABLET | Refills: 1 | Status: SHIPPED | OUTPATIENT
Start: 2019-03-21 | End: 2019-06-17 | Stop reason: SDUPTHER

## 2019-03-21 RX ORDER — LEVOTHYROXINE SODIUM 0.1 MG/1
TABLET ORAL
Qty: 30 TABLET | Refills: 1 | Status: SHIPPED | OUTPATIENT
Start: 2019-03-21 | End: 2019-06-17 | Stop reason: SDUPTHER

## 2019-03-21 RX ORDER — ATORVASTATIN CALCIUM 10 MG/1
TABLET, FILM COATED ORAL
Qty: 30 TABLET | Refills: 1 | Status: SHIPPED | OUTPATIENT
Start: 2019-03-21 | End: 2019-06-17 | Stop reason: SDUPTHER

## 2019-06-17 DIAGNOSIS — E03.9 HYPOTHYROIDISM, UNSPECIFIED TYPE: ICD-10-CM

## 2019-06-17 RX ORDER — ATORVASTATIN CALCIUM 10 MG/1
10 TABLET, FILM COATED ORAL DAILY
Qty: 30 TABLET | Refills: 0 | Status: SHIPPED | OUTPATIENT
Start: 2019-06-17 | End: 2019-07-17 | Stop reason: SDUPTHER

## 2019-06-17 RX ORDER — LEVOTHYROXINE SODIUM 0.1 MG/1
100 TABLET ORAL DAILY
Qty: 30 TABLET | Refills: 0 | Status: SHIPPED | OUTPATIENT
Start: 2019-06-17 | End: 2019-07-17 | Stop reason: SDUPTHER

## 2019-06-17 RX ORDER — SERTRALINE HYDROCHLORIDE 100 MG/1
TABLET, FILM COATED ORAL
Qty: 30 TABLET | Refills: 0 | Status: SHIPPED | OUTPATIENT
Start: 2019-06-17 | End: 2019-07-17 | Stop reason: SDUPTHER

## 2019-07-10 DIAGNOSIS — E78.2 MIXED HYPERLIPIDEMIA: ICD-10-CM

## 2019-07-10 DIAGNOSIS — E03.9 HYPOTHYROIDISM, UNSPECIFIED TYPE: ICD-10-CM

## 2019-07-10 LAB
ALBUMIN SERPL-MCNC: 4.4 G/DL (ref 3.5–4.6)
ALP BLD-CCNC: 96 U/L (ref 40–130)
ALT SERPL-CCNC: 18 U/L (ref 0–33)
ANION GAP SERPL CALCULATED.3IONS-SCNC: 13 MEQ/L (ref 9–15)
AST SERPL-CCNC: 18 U/L (ref 0–35)
BILIRUB SERPL-MCNC: 0.3 MG/DL (ref 0.2–0.7)
BUN BLDV-MCNC: 15 MG/DL (ref 8–23)
CALCIUM SERPL-MCNC: 8.7 MG/DL (ref 8.5–9.9)
CHLORIDE BLD-SCNC: 105 MEQ/L (ref 95–107)
CHOLESTEROL, TOTAL: 148 MG/DL (ref 0–199)
CO2: 23 MEQ/L (ref 20–31)
CREAT SERPL-MCNC: 0.63 MG/DL (ref 0.5–0.9)
GFR AFRICAN AMERICAN: >60
GFR NON-AFRICAN AMERICAN: >60
GLOBULIN: 2.5 G/DL (ref 2.3–3.5)
GLUCOSE BLD-MCNC: 85 MG/DL (ref 70–99)
HDLC SERPL-MCNC: 49 MG/DL (ref 40–59)
LDL CHOLESTEROL CALCULATED: 82 MG/DL (ref 0–129)
POTASSIUM SERPL-SCNC: 4.6 MEQ/L (ref 3.4–4.9)
SODIUM BLD-SCNC: 141 MEQ/L (ref 135–144)
TOTAL PROTEIN: 6.9 G/DL (ref 6.3–8)
TRIGL SERPL-MCNC: 87 MG/DL (ref 0–150)
TSH SERPL DL<=0.05 MIU/L-ACNC: 1.3 UIU/ML (ref 0.44–3.86)

## 2019-07-17 ENCOUNTER — OFFICE VISIT (OUTPATIENT)
Dept: FAMILY MEDICINE CLINIC | Age: 64
End: 2019-07-17

## 2019-07-17 VITALS
BODY MASS INDEX: 28.7 KG/M2 | DIASTOLIC BLOOD PRESSURE: 78 MMHG | OXYGEN SATURATION: 100 % | RESPIRATION RATE: 10 BRPM | HEIGHT: 63 IN | WEIGHT: 162 LBS | SYSTOLIC BLOOD PRESSURE: 118 MMHG | HEART RATE: 59 BPM | TEMPERATURE: 96.9 F

## 2019-07-17 DIAGNOSIS — E03.9 HYPOTHYROIDISM, UNSPECIFIED TYPE: Primary | ICD-10-CM

## 2019-07-17 DIAGNOSIS — F32.A DEPRESSION, UNSPECIFIED DEPRESSION TYPE: ICD-10-CM

## 2019-07-17 DIAGNOSIS — E78.2 MIXED HYPERLIPIDEMIA: ICD-10-CM

## 2019-07-17 PROCEDURE — 99214 OFFICE O/P EST MOD 30 MIN: CPT | Performed by: FAMILY MEDICINE

## 2019-07-17 RX ORDER — LEVOTHYROXINE SODIUM 0.1 MG/1
100 TABLET ORAL DAILY
Qty: 30 TABLET | Refills: 5 | Status: SHIPPED | OUTPATIENT
Start: 2019-07-17 | End: 2019-12-23

## 2019-07-17 RX ORDER — SERTRALINE HYDROCHLORIDE 100 MG/1
TABLET, FILM COATED ORAL
Qty: 30 TABLET | Refills: 5 | Status: SHIPPED | OUTPATIENT
Start: 2019-07-17 | End: 2019-12-23 | Stop reason: SDUPTHER

## 2019-07-17 RX ORDER — ATORVASTATIN CALCIUM 10 MG/1
10 TABLET, FILM COATED ORAL DAILY
Qty: 30 TABLET | Refills: 5 | Status: SHIPPED | OUTPATIENT
Start: 2019-07-17 | End: 2019-12-23 | Stop reason: SDUPTHER

## 2019-07-17 ASSESSMENT — ENCOUNTER SYMPTOMS: SHORTNESS OF BREATH: 0

## 2019-07-17 NOTE — PROGRESS NOTES
contact    Assessment:          Diagnosis Orders   1. Hypothyroidism, unspecified type  atorvastatin (LIPITOR) 10 MG tablet    levothyroxine (SYNTHROID) 100 MCG tablet   2. Mixed hyperlipidemia     3.  Depression, unspecified depression type        Plan:      Orders Placed This Encounter   Medications    atorvastatin (LIPITOR) 10 MG tablet     Sig: Take 1 tablet by mouth daily     Dispense:  30 tablet     Refill:  5    levothyroxine (SYNTHROID) 100 MCG tablet     Sig: Take 1 tablet by mouth Daily     Dispense:  30 tablet     Refill:  5    sertraline (ZOLOFT) 100 MG tablet     Sig: take 1 tablet by mouth once daily     Dispense:  30 tablet     Refill:  5   f/u after non fasting blood work in dec and f/u after done

## 2019-12-09 ENCOUNTER — OFFICE VISIT (OUTPATIENT)
Dept: FAMILY MEDICINE CLINIC | Age: 64
End: 2019-12-09

## 2019-12-09 VITALS
BODY MASS INDEX: 29.13 KG/M2 | DIASTOLIC BLOOD PRESSURE: 78 MMHG | HEART RATE: 70 BPM | WEIGHT: 164.4 LBS | OXYGEN SATURATION: 97 % | HEIGHT: 63 IN | TEMPERATURE: 97.4 F | SYSTOLIC BLOOD PRESSURE: 130 MMHG

## 2019-12-09 DIAGNOSIS — M25.512 ACUTE PAIN OF LEFT SHOULDER: Primary | ICD-10-CM

## 2019-12-09 DIAGNOSIS — S46.912A STRAIN OF LEFT SHOULDER, INITIAL ENCOUNTER: ICD-10-CM

## 2019-12-09 PROCEDURE — 99214 OFFICE O/P EST MOD 30 MIN: CPT | Performed by: NURSE PRACTITIONER

## 2019-12-09 RX ORDER — TRAMADOL HYDROCHLORIDE 50 MG/1
50 TABLET ORAL EVERY 6 HOURS PRN
Qty: 12 TABLET | Refills: 0 | Status: SHIPPED | OUTPATIENT
Start: 2019-12-09 | End: 2019-12-12

## 2019-12-09 RX ORDER — NAPROXEN 500 MG/1
500 TABLET ORAL 2 TIMES DAILY WITH MEALS
Qty: 60 TABLET | Refills: 0 | Status: SHIPPED | OUTPATIENT
Start: 2019-12-09 | End: 2020-08-12

## 2019-12-09 ASSESSMENT — ENCOUNTER SYMPTOMS
SHORTNESS OF BREATH: 0
COLOR CHANGE: 0
BACK PAIN: 0

## 2019-12-10 ENCOUNTER — TELEPHONE (OUTPATIENT)
Dept: FAMILY MEDICINE CLINIC | Age: 64
End: 2019-12-10

## 2019-12-10 DIAGNOSIS — S42.255A CLOSED NONDISPLACED FRACTURE OF GREATER TUBEROSITY OF LEFT HUMERUS, INITIAL ENCOUNTER: Primary | ICD-10-CM

## 2019-12-13 ENCOUNTER — OFFICE VISIT (OUTPATIENT)
Dept: FAMILY MEDICINE CLINIC | Age: 64
End: 2019-12-13

## 2019-12-13 VITALS
RESPIRATION RATE: 14 BRPM | HEART RATE: 69 BPM | HEIGHT: 63 IN | OXYGEN SATURATION: 95 % | TEMPERATURE: 97.9 F | DIASTOLIC BLOOD PRESSURE: 60 MMHG | BODY MASS INDEX: 29.41 KG/M2 | SYSTOLIC BLOOD PRESSURE: 126 MMHG | WEIGHT: 166 LBS

## 2019-12-13 DIAGNOSIS — T50.905A MEDICATION SIDE EFFECT, INITIAL ENCOUNTER: ICD-10-CM

## 2019-12-13 DIAGNOSIS — S49.92XD SHOULDER INJURY, LEFT, SUBSEQUENT ENCOUNTER: Primary | ICD-10-CM

## 2019-12-13 PROCEDURE — 99212 OFFICE O/P EST SF 10 MIN: CPT | Performed by: NURSE PRACTITIONER

## 2019-12-16 ENCOUNTER — OFFICE VISIT (OUTPATIENT)
Dept: FAMILY MEDICINE CLINIC | Age: 64
End: 2019-12-16

## 2019-12-16 VITALS
HEART RATE: 52 BPM | HEIGHT: 63 IN | SYSTOLIC BLOOD PRESSURE: 126 MMHG | TEMPERATURE: 98.1 F | DIASTOLIC BLOOD PRESSURE: 80 MMHG | BODY MASS INDEX: 29.34 KG/M2 | WEIGHT: 165.6 LBS | OXYGEN SATURATION: 96 %

## 2019-12-16 DIAGNOSIS — M79.602 PAIN OF LEFT UPPER EXTREMITY: ICD-10-CM

## 2019-12-16 DIAGNOSIS — S46.912D STRAIN OF LEFT SHOULDER, SUBSEQUENT ENCOUNTER: ICD-10-CM

## 2019-12-16 DIAGNOSIS — E03.9 HYPOTHYROIDISM, UNSPECIFIED TYPE: ICD-10-CM

## 2019-12-16 DIAGNOSIS — S42.295D OTHER CLOSED NONDISPLACED FRACTURE OF PROXIMAL END OF LEFT HUMERUS WITH ROUTINE HEALING, SUBSEQUENT ENCOUNTER: Primary | ICD-10-CM

## 2019-12-16 LAB — TSH SERPL DL<=0.05 MIU/L-ACNC: 12.28 UIU/ML (ref 0.44–3.86)

## 2019-12-16 PROCEDURE — 99212 OFFICE O/P EST SF 10 MIN: CPT | Performed by: NURSE PRACTITIONER

## 2019-12-16 ASSESSMENT — ENCOUNTER SYMPTOMS
COLOR CHANGE: 0
BACK PAIN: 0

## 2019-12-23 ENCOUNTER — OFFICE VISIT (OUTPATIENT)
Dept: FAMILY MEDICINE CLINIC | Age: 64
End: 2019-12-23

## 2019-12-23 VITALS
SYSTOLIC BLOOD PRESSURE: 110 MMHG | RESPIRATION RATE: 16 BRPM | WEIGHT: 165 LBS | TEMPERATURE: 98.7 F | BODY MASS INDEX: 29.23 KG/M2 | HEART RATE: 67 BPM | OXYGEN SATURATION: 96 % | HEIGHT: 63 IN | DIASTOLIC BLOOD PRESSURE: 70 MMHG
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
## 2019-12-23 DIAGNOSIS — Z76.0 MEDICATION REFILL: ICD-10-CM

## 2019-12-23 DIAGNOSIS — S42.255A CLOSED NONDISPLACED FRACTURE OF GREATER TUBEROSITY OF LEFT HUMERUS, INITIAL ENCOUNTER: ICD-10-CM

## 2019-12-23 DIAGNOSIS — E03.9 HYPOTHYROIDISM, UNSPECIFIED TYPE: ICD-10-CM

## 2019-12-23 PROCEDURE — 99213 OFFICE O/P EST LOW 20 MIN: CPT | Performed by: FAMILY MEDICINE

## 2019-12-23 RX ORDER — ATORVASTATIN CALCIUM 10 MG/1
10 TABLET, FILM COATED ORAL DAILY
Qty: 30 TABLET | Refills: 3 | Status: SHIPPED | OUTPATIENT
Start: 2019-12-23 | End: 2020-04-06

## 2019-12-23 RX ORDER — LEVOTHYROXINE SODIUM 0.12 MG/1
125 TABLET ORAL DAILY
Qty: 30 TABLET | Refills: 3 | Status: SHIPPED | OUTPATIENT
Start: 2019-12-23 | End: 2020-04-06

## 2019-12-23 RX ORDER — LEVOTHYROXINE SODIUM 0.1 MG/1
100 TABLET ORAL DAILY
Qty: 30 TABLET | Refills: 5 | Status: CANCELLED | OUTPATIENT
Start: 2019-12-23

## 2019-12-23 RX ORDER — SERTRALINE HYDROCHLORIDE 100 MG/1
TABLET, FILM COATED ORAL
Qty: 30 TABLET | Refills: 3 | Status: SHIPPED | OUTPATIENT
Start: 2019-12-23 | End: 2020-04-06

## 2019-12-23 ASSESSMENT — ENCOUNTER SYMPTOMS
COLOR CHANGE: 1
SHORTNESS OF BREATH: 0

## 2019-12-29 ASSESSMENT — ENCOUNTER SYMPTOMS
COLOR CHANGE: 0
ROS SKIN COMMENTS: PRURITIS
DIARRHEA: 0
VOMITING: 0
NAUSEA: 0
BACK PAIN: 0

## 2019-12-30 ENCOUNTER — HOSPITAL ENCOUNTER (OUTPATIENT)
Dept: ORTHOPEDIC SURGERY | Age: 64
Discharge: HOME | End: 2020-01-01

## 2019-12-30 ENCOUNTER — OFFICE VISIT (OUTPATIENT)
Dept: ORTHOPEDIC SURGERY | Age: 64
End: 2019-12-30

## 2019-12-30 VITALS
OXYGEN SATURATION: 96 % | HEIGHT: 63 IN | HEART RATE: 72 BPM | BODY MASS INDEX: 29.23 KG/M2 | WEIGHT: 165 LBS | TEMPERATURE: 97.4 F

## 2019-12-30 DIAGNOSIS — S42.252D CLOSED DISPLACED FRACTURE OF GREATER TUBEROSITY OF LEFT HUMERUS WITH ROUTINE HEALING, SUBSEQUENT ENCOUNTER: Primary | ICD-10-CM

## 2019-12-30 DIAGNOSIS — F17.210 CIGARETTE NICOTINE DEPENDENCE WITHOUT COMPLICATION: ICD-10-CM

## 2019-12-30 PROCEDURE — 23620 CLTX GR HMRL TBRS FX WO MNPJ: CPT | Performed by: ORTHOPAEDIC SURGERY

## 2019-12-30 PROCEDURE — L3670 SO ACRO/CLAV CAN WEB PRE OTS: HCPCS | Performed by: ORTHOPAEDIC SURGERY

## 2019-12-30 PROCEDURE — 73030 X-RAY EXAM OF SHOULDER: CPT | Performed by: ORTHOPAEDIC SURGERY

## 2019-12-30 PROCEDURE — 99202 OFFICE O/P NEW SF 15 MIN: CPT | Performed by: ORTHOPAEDIC SURGERY

## 2019-12-30 PROCEDURE — 73030 X-RAY EXAM OF SHOULDER: CPT

## 2020-01-13 ENCOUNTER — HOSPITAL ENCOUNTER (OUTPATIENT)
Dept: ORTHOPEDIC SURGERY | Age: 65
Discharge: HOME | End: 2020-01-15

## 2020-01-13 ENCOUNTER — OFFICE VISIT (OUTPATIENT)
Dept: ORTHOPEDIC SURGERY | Age: 65
End: 2020-01-13

## 2020-01-13 VITALS
WEIGHT: 165 LBS | HEART RATE: 57 BPM | BODY MASS INDEX: 29.23 KG/M2 | TEMPERATURE: 97.2 F | OXYGEN SATURATION: 98 % | HEIGHT: 63 IN

## 2020-01-13 PROBLEM — F17.210 CIGARETTE NICOTINE DEPENDENCE WITHOUT COMPLICATION: Status: RESOLVED | Noted: 2019-12-30 | Resolved: 2020-01-13

## 2020-01-13 PROCEDURE — 73030 X-RAY EXAM OF SHOULDER: CPT | Performed by: ORTHOPAEDIC SURGERY

## 2020-01-13 PROCEDURE — 73030 X-RAY EXAM OF SHOULDER: CPT

## 2020-01-13 PROCEDURE — 99024 POSTOP FOLLOW-UP VISIT: CPT | Performed by: ORTHOPAEDIC SURGERY

## 2020-01-13 NOTE — PROGRESS NOTES
Subjective:      Patient ID: Rita Kim is a 59 y.o. female who presents today for:  Chief Complaint   Patient presents with    Follow-up     3 week, fracture of left shoulder, pt states she is still having pain with movement, right hand dominate       HPI:    [place] sustained 6 weeks ago and a left tuberosity fracture of the humerus. She has been doing well. She is stop smoking to allow the fracture to heal.  She feels that she will be able to continue to not smoke.       Past Medical History:   Diagnosis Date    Depression     Hyperlipidemia     Hypothyroidism      Past Surgical History:   Procedure Laterality Date    BACK SURGERY  2012     Social History     Socioeconomic History    Marital status:      Spouse name: Not on file    Number of children: Not on file    Years of education: Not on file    Highest education level: Not on file   Occupational History    Occupation: loading and recieving     Employer: [employer]   Social Needs    Financial resource strain: Not on file    Food insecurity:     Worry: Not on file     Inability: Not on file    Transportation needs:     Medical: Not on file     Non-medical: Not on file   Tobacco Use    Smoking status: Current Every Day Smoker     Packs/day: 0.20     Years: 10.00     Pack years: 2.00     Types: Cigarettes    Smokeless tobacco: Never Used   Substance and Sexual Activity    Alcohol use: No    Drug use: No    Sexual activity: Not on file   Lifestyle    Physical activity:     Days per week: Not on file     Minutes per session: Not on file    Stress: Not on file   Relationships    Social connections:     Talks on phone: Not on file     Gets together: Not on file     Attends Jehovah's witness service: Not on file     Active member of club or organization: Not on file     Attends meetings of clubs or organizations: Not on file     Relationship status: Not on file    Intimate partner violence:     Fear of current or ex partner: Not on file Emotionally abused: Not on file     Physically abused: Not on file     Forced sexual activity: Not on file   Other Topics Concern    Not on file   Social History Narrative    Not on file     Family History   Problem Relation Age of Onset    Cancer Father      Allergies   Allergen Reactions    Pcn [Penicillins] Hives    Tramadol Itching     Itch and nightmares    Vicodin [Hydrocodone-Acetaminophen] Itching     Current Outpatient Medications on File Prior to Visit   Medication Sig Dispense Refill    atorvastatin (LIPITOR) 10 MG tablet Take 1 tablet by mouth daily 30 tablet 3    sertraline (ZOLOFT) 100 MG tablet take 1 tablet by mouth once daily 30 tablet 3    levothyroxine (SYNTHROID) 125 MCG tablet Take 1 tablet by mouth daily 30 tablet 3    naproxen (NAPROSYN) 500 MG tablet Take 1 tablet by mouth 2 times daily (with meals) 60 tablet 0    vitamin E 1000 UNIT capsule Take 1,000 Units by mouth daily.  vitamin D (CHOLECALCIFEROL) 400 UNIT TABS tablet Take 400 Units by mouth 3 times daily. No current facility-administered medications on file prior to visit. Acute and Chronic Pain Monitoring:   No flowsheet data found. Review of Systems      Objective--Physical Examination:     Pulse 57   Temp 97.2 °F (36.2 °C) (Temporal)   Ht 5' 3\" (1.6 m)   Wt 165 lb (74.8 kg)   LMP  (LMP Unknown)   SpO2 98%   BMI 29.23 kg/m²     Physical Examination:   Ortho Exam  Ms. Alvarez is not impaired distress. The shoulder is minimally swollen, range of motion was not assessed. Radiographs and Laboratory Studies:     Diagnostic Imaging Studies:    AP and lateral view of the left shoulder taken for follow-up of fracture reveals that the minimally displaced greater tuberosity fracture has not changed in alignment. Alignment is acceptable.     Laboratory Studies:   Lab Results   Component Value Date    WBC 9.7 03/19/2019    HGB 13.4 03/19/2019    HCT 38.8 03/19/2019    MCV 95.2 03/19/2019

## 2020-01-28 ENCOUNTER — HOSPITAL ENCOUNTER (OUTPATIENT)
Dept: OCCUPATIONAL THERAPY | Age: 65
Setting detail: THERAPIES SERIES
Discharge: HOME | End: 2020-01-28

## 2020-01-28 ENCOUNTER — TELEPHONE (OUTPATIENT)
Dept: ORTHOPEDIC SURGERY | Age: 65
End: 2020-01-28

## 2020-01-28 PROCEDURE — 97166 OT EVAL MOD COMPLEX 45 MIN: CPT

## 2020-01-28 NOTE — PROGRESS NOTES
_          PLEASE PROVIDE ANY CURRENT RESTRICTIONS, LIFTING LIMITATIONS, MOVEMENT AGAINST RESISTANCE, AS NONE WERE IDENTIFIED ON REFERRAL         OCCUPATIONAL THERAPY EVALUATION  Evaluation Date:  1/28/2020    Patient Name: Joan Sung  YOB: 1955   Medical Diagnosis:  No diagnosis found. Treatment Diagnosis:L shoulder pain  Referring Physician: Baldemar Rodriguez MD   Referral Date:  1-13-20  Onset Date: 12-2-2020     Visits Allowed/Insurance/Certification Information  No insurance  Restrictions/Precautions No lifting of objects per Pt report . NO RESTRICTIONS LISTED ON REFERRAL  SUBJECTIVE FINDINGS  Pain  Patient describes pain to be \"very achy\"   Patient reports 0 /10 at rest, 8 /10 with movement.  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 [address] - Pt reports that an lifting or attempts to move the arm is very painful        OBJECTIVE FINDINGS  Range of Motion  Hand  AROM        LEFT     date      Shoulder:   flex  35 AROM/40PROM painful                       IR  30AROM/30PROM painful                       ER  20AROM /25 PROM painful                      ABD  30 AROM/30 PROM painful   Elbow:      ext/flex  -20/-25 Pt had pain with each movement   Forearm:  sup/pron  WNL    Wrist:       ext/flex  WNL                     RD/UD  WNL      Comments:All hand and wrist and finger movements are WNL on the L    Strength  Muscle  (*denotes pain) Left  Comments       Elbow Flexion   Pain with attempt to test NT   Elbow Extension   Pain with attempt to test NT   Pronation 4/5     Supination 4/5     Wrist Flexion 5/5     Wrist Extension 5/5     Ulnar Deviation 5/5     Radial Deviation 5/5        Strength (Dynamometry) and Pinch Strength  In lbs. Left    Date          - Position Two  WNL                Lateral Pinch  WNL       [place] PInch  WNL                Coordination          9 Hole Peg Test  WNL                 Comments:If L UE is stabilized against body or resting on surface. Pt can fully use  L wrist forearm hand and fingers        The Upper Extremity Functional Index  The Upper Extremity Functional Scale  Any of your usual work, housework, or school activities: Moderate Difficulty  Your usual hobbies, recreational, or sporting activities: Moderate Difficulty  Lifting a bag of groceries to waist level: Extreme Difficulty or Unable to Perform Activity  Lifting a bag of groceries above your head: Extreme Difficulty or Unable to Perform Activity  Grooming your hair: Extreme Difficulty or Unable to Perform Activity  Pushing up on your hands (eg from bathtub/chair): Extreme Difficulty or Unable to Perform Activity  Preparing food (eg peeling, cutting): No Difficulty  Driving: Quite a Bit of Difficulty  Vacuuming, sweeping, or raking: Moderate Difficulty  Dressing: Moderate Difficulty  Doing up buttons: No Difficulty  Using tools or appliances: No Difficulty  Opening doors: No Difficulty  Cleaning: A Little Bit of Difficulty  Tying or lacing shoes: No Difficulty  Sleeping: Extreme Difficulty or Unable to Perform Activity  Laundering clothes (eg washing, ironing, folding) :  A Little Bit of Difficulty  Opening a jar: No Difficulty  Throwing a ball: No Difficulty  Carrying a small suitcase with your affected limb: Extreme Difficulty or Unable to Perform Activity  UEFS Score: 53.75  UEFS Disability Index: 20-39%  UEFS CMS Modifier: CJ    Scoring:   UEFI Score = (Sum of

## 2020-01-30 ENCOUNTER — HOSPITAL ENCOUNTER (OUTPATIENT)
Dept: OCCUPATIONAL THERAPY | Age: 65
Setting detail: THERAPIES SERIES
Discharge: HOME | End: 2020-01-30

## 2020-01-30 PROCEDURE — 97140 MANUAL THERAPY 1/> REGIONS: CPT

## 2020-01-30 NOTE — PROGRESS NOTES
mobilization:       [] [place]  [] Vertical stroke   [] Bear claw   [] Skin roll:        Transverse Plane:     [x] Thoracic inlet:  [] Respiratory diaphragm:    [] Other:        Upper Extremity    [x] Arm pull           [] Right   [x] Left    [] Both    [] Finger pull           [] Right   [] Left   [] Both     [x] Soft tissue mobilization (biceps/triceps):    [] Other:         Deep releases:     [x] Biceps           [] Right  [x] Left    [] Both   [x] Triceps            [] Right   [x] Left    [] Both     [] Forearm flexors           [] Right  [] Left   [] Both    [] Forearm extensors          [] Right   [] Left   [] Both      [] Palm           [] Right  [] Left   [] Both   [] Other:                  Transverse plane:    [x] Shoulder:   [] Elbow:    [] Wrist:  [] Hand:    [] Fingers:                 Exercises:Instructed in pendulum exercises and a written handout provided with pictures. Patient did not perform these exercises this date due to the pain. Assessment:   Pt tolerated treatment well.      Plan:   Continue POC        Goals:1,2,3,5 addressed  Long term goals  Time Frame for Long term goals : 2 x/week for  4 weeks 60 min    Long term goal 1: PROM and AROM of L shoulder Flex/Ext, IR/ER, ABD will be [facility] for self care and daily activities  Long term goal 2: Decrease pain to no more than a 2/!0 with AROM/PROM  Long term goal 3: Reduce /eliminate protective postural holding pattern  Long term goal 4: Strengthening L shoulder when lifting/resistance is permitted by referring physician beginning with isometrics  Long term goal 5: Pt will comply with HEP with updates as she improves    DOUG Monroy   1/30/2020  10:17 AM   Electronically signed by NIMA Monroy on 1/30/20 at 10:22 AM

## 2020-02-04 ENCOUNTER — HOSPITAL ENCOUNTER (OUTPATIENT)
Dept: OCCUPATIONAL THERAPY | Age: 65
Setting detail: THERAPIES SERIES
Discharge: HOME | End: 2020-02-04

## 2020-02-04 PROCEDURE — 97530 THERAPEUTIC ACTIVITIES: CPT

## 2020-02-04 PROCEDURE — 97026 INFRARED THERAPY: CPT

## 2020-02-04 PROCEDURE — 97140 MANUAL THERAPY 1/> REGIONS: CPT

## 2020-02-04 NOTE — PROGRESS NOTES
Occupational Therapy  Daily Treatment Note  Date: 2020  Patient Name: Mel uPente  :  1955  MRN: [id number]       Subjective Pt seen on time for session  OT Visit Information  Progress Note Counter:   Pre Treatment Pain Screening  Pain at present: 2  Scale Used: Numeric Score   Treatment Activities:  Pt reports that she is successfully doing  Her HEP. Pt was treated with MFR and modality as noted below:                               Deep Release:    [] Anterior chest:   [] Horizontal thoracic:     [x] Pectoralis major            [] Right  [x] Left    [] Both     [x] Pectoralis minor           [] Right   [x] Left   [] Both         [] Horizontal pectoral:   [] Vertical pectoral:     [] Thoracic paraspinals            [] Right  [] Left   [] Both:     [] Lateral Obliques            [] Right   [] Left    [] Both       [] Soft tissue mobilization:       [] [place]  [] Vertical stroke   [] Bear claw   [] Skin roll:        Transverse Plane:     [x] Thoracic inlet:  [x] Respiratory diaphragm:    [] Other:        Upper Extremity    [x] Arm pull           [] Right   [x] Left    [] Both    [] Finger pull           [] Right   [] Left   [] Both     [] Soft tissue mobilization (identify area):    [] Other:         Deep releases:     [x] Biceps           [] Right  [x] Left    [] Both   [] Triceps            [] Right   [] Left    [] Both     [] Forearm flexors           [] Right  [] Left   [] Both    [] Forearm extensors          [] Right   [] Left   [] Both      [] Palm           [] Right  [] Left   [] Both   [] Other:                  Transverse plane:    [x] Shoulder:   [x] Elbow:    [] Wrist:  [] Hand:    [] Fingers:       45 min required  infared was  Utilized to further reduce pain  Of the L shoulder    10 min 5 areas 15 J intensity 3      AROM L shoulder flex ABD and ER to tolerance    5 min              Assessment  Pt tolerated all tx well .  Pain with ER was primary complaint     Post Treatment Pain Screening  Pain at present: 2         Plan  Continue with tx plan .  No new HEP activities today           Goals As per tx plan       Therapy Time   Individual Concurrent Group Co-treatment   Time In  900         Time Out  1000         Minutes  60               Electronically signed by JULIANNA Matos/L on 2/4/2020 at 3:17 PM  Domingo Herman OTR/L

## 2020-02-06 ENCOUNTER — HOSPITAL ENCOUNTER (OUTPATIENT)
Dept: OCCUPATIONAL THERAPY | Age: 65
Setting detail: THERAPIES SERIES
Discharge: HOME | End: 2020-02-06

## 2020-02-06 PROCEDURE — 97140 MANUAL THERAPY 1/> REGIONS: CPT

## 2020-02-06 NOTE — PROGRESS NOTES
[]? Right   []? Left    []? Both       []? Soft tissue mobilization:       []? [place]  []? Vertical stroke   []? Bear claw   []? Skin roll:          Transverse Plane:      [x]? Thoracic inlet:  []? Respiratory diaphragm:    []? Other:          Upper Extremity     [x]? Arm pull           []? Right   [x]? Left    []? Both    []? Finger pull           []? Right   []? Left   []? Both     [x]? Soft tissue mobilization (biceps/triceps):    []? Other:           Deep releases:      [x]? Biceps           []? Right  [x]? Left    []? Both   [x]? Triceps            []? Right   [x]? Left    []? Both     []? Forearm flexors           []? Right  []? Left   []? Both    []? Forearm extensors          []? Right   []? Left   []? Both      []? Palm           []? Right  []? Left   []? Both   []? Other:                   Transverse plane:     [x]? Shoulder:   []? Elbow:    []? Wrist:  []? Hand:    []? Fingers:               Exercises:       Assessment:   Pt tolerated treatment well.      Plan:   Continue POC        Goals:1-3 addressed  Long term goals  Time Frame for Long term goals : 2 x/week for  4 weeks 60 min    Long term goal 1: PROM and AROM of L shoulder Flex/Ext, IR/ER, ABD will be [facility] for self care and daily activities  Long term goal 2: Decrease pain to no more than a 2/!0 with AROM/PROM  Long term goal 3: Reduce /eliminate protective postural holding pattern  Long term goal 4: Strengthening L shoulder when lifting/resistance is permitted by referring physician beginning with isometrics  Long term goal 5: Pt will comply with HEP with updates as she improves    NORI Heart/L   2/6/2020  9:38 AM   Electronically signed by NIMA Heart on 2/6/20 at 9:40 AM

## 2020-02-10 ENCOUNTER — HOSPITAL ENCOUNTER (OUTPATIENT)
Dept: OCCUPATIONAL THERAPY | Age: 65
Setting detail: THERAPIES SERIES
Discharge: HOME | End: 2020-02-10

## 2020-02-10 PROCEDURE — 97140 MANUAL THERAPY 1/> REGIONS: CPT

## 2020-02-10 NOTE — PROGRESS NOTES
Occupational Therapy  Daily Note     Name: Robbie Rebollar  : 1955  MRN: [id number]  Diagnosis: Left UE shoulder pain    Visit Information:   Onset Date: 20  OT Insurance Information: No Insurance  Progress Note Counter:     Date: 2/10/2020    OT Manual therapy 60 minutes for 4 unit(s)       OT Individual Minutes  Time In: 0800  Time Out: 0900  Minutes: 61    Referring Practitioner: Dr. Gayatri Morrow MD         Comments: NO RESTRICTIONS per referral. No lifting of objects per patient report. Due to fracture, no thermal ultrasound. Subjective: \"I can finally put my shirt on without pain. \"          Additional Pertinent Hx: Eval 2020 MARNIE Ann OTR/L    Pain rating:        Pre-treatment pain:0/10 Left shoulder     Pain after treatment:  10 left shoulder        Focus of treatment was on the following:   []? ?Neuromuscular Re-education    []? ?Orthotic/Splint/Brace: fabrication/education []? ?UE strength  []? ? Right []? ? Left []? ? Both   []? ? Strength   []? ? Right []? ? Left []? ? Both     []? ? Cognition  []? ?Balance  []? ?Posture/positioning   []? ?Decreasing pain    []? ? Coordination []? ?ADL's []? ? Functional Mobility []? ? Home management    [x]? ?ROM []? ?Endurance  []? ?Transfers []? ? Fine Motor   []? ? Visual []? ?Caregiver training []? ?Perceptual                    []? ?Sensory                    []? ? Edema management  [x]? ? Direct treatment techniques      Provided infrared heat treatment of 7J/35 seconds per site to the middle deltoid, biceps, and triceps, superior deltoid. Followed the heat treatment with direct treatment techniques of MFR as follows;     Thoracic      Deep Release:     []? ? Anterior chest:   []?? Horizontal thoracic:     [x]? ? Pectoralis major            []? ? Right  [x]? ? Left    []? ? Both     [x]? ? Pectoralis minor           []? ? Right   [x]? ? Left   []? ? Both         []? ? Horizontal pectoral:   []? ? Vertical pectoral:     []? ? Thoracic paraspinals            []? ? Right  []? ? Left   []?? Both:     []? ? Lateral Obliques            []? ? Right   []? ? Left    []? ? Both       []? ? Soft tissue mobilization:       []? ? [place]  []? ? Vertical stroke   []? ? Bear claw   []? ? Skin roll:          Transverse Plane:      [x]? ? Thoracic inlet:  []?? Respiratory diaphragm:    []?? Other:          Upper Extremity     [x]? ? Arm pull           []? ? Right   [x]? ? Left    []? ? Both    []? ? Finger pull           []? ? Right   []? ? Left   []? ? Both     [x]? ? Soft tissue mobilization (biceps/triceps):    []? ? Other:           Deep releases:      [x]? ? Biceps           []? ? Right  [x]? ? Left    []? ? Both   [x]? ? Triceps            []? ? Right   [x]? ? Left    []? ? Both     []? ? Forearm flexors           []? ? Right  []? ? Left   []? ? Both    []? ? Forearm extensors          []? ? Right   []? ? Left   []? ? Both      []? ? Palm           []? ? Right  []? ? Left   []? ? Both   []? ? Other:           Transverse plane:     [x]? ? Shoulder:   []? ? Elbow:    []?? Wrist:  []?? Hand:    []?? Fingers:              Exercises: HEP: AAROM of shoulder flexion and abduction, with or without cane, no other weight. Assessment:   Pt tolerated treatment well. However, patient reports a 1/10 after performing 5 repetitions of AAROM of L UE shoulder abduction.     Plan:   Continue POC      1-3,5 addressed  Goals:  Long term goals  Time Frame for Long term goals : 2 x/week for  4 weeks 60 min    Long term goal 1: PROM and AROM of L shoulder Flex/Ext, IR/ER, ABD will be [facility] for self care and daily activities  Long term goal 2: Decrease pain to no more than a 2/!0 with AROM/PROM  Long term goal 3: Reduce /eliminate protective postural holding pattern  Long term goal 4: Strengthening L shoulder when lifting/resistance is permitted by referring physician beginning with isometrics  Long term goal 5: Pt will comply with HEP with updates as she improves    DOUG Sanchez   2/10/2020  9:12 AM   Electronically signed by NIMA Sanchez on 2/10/20 at

## 2020-02-13 ENCOUNTER — HOSPITAL ENCOUNTER (OUTPATIENT)
Dept: OCCUPATIONAL THERAPY | Age: 65
Setting detail: THERAPIES SERIES
Discharge: HOME | End: 2020-02-13

## 2020-02-13 PROCEDURE — 97140 MANUAL THERAPY 1/> REGIONS: CPT

## 2020-02-13 NOTE — PROGRESS NOTES
pectoral:     []??? Thoracic paraspinals            []??? Right  []??? Left   []? ?? Both:     []? ?? Lateral Obliques            []??? Right   []? ?? Left    []? ?? Both       []??? Soft tissue mobilization:       []??? [place]  []??? Vertical stroke   []? ?? Bear claw   []? ?? Skin roll:          Transverse Plane:      [x]? ?? Thoracic inlet:  []??? Respiratory diaphragm:    []??? Other:          Upper Extremity     [x]? ?? Arm pull           []??? Right   [x]? ?? Left    []? ?? Both    []??? Finger pull           []??? Right   []? ?? Left   []? ?? Both     [x]? ?? Soft tissue mobilization (biceps/triceps):    []??? Other:           Deep releases:      [x]? ?? Biceps           []??? Right  [x]? ?? Left    []? ?? Both   [x]? ?? Triceps            []??? Right   [x]? ?? Left    []? ?? Both     []??? Forearm flexors           []??? Right  []??? Left   []? ?? Both    []??? Forearm extensors          []??? Right   []? ?? Left   []? ?? Both      []??? Palm           []??? Right  []??? Left   []? ?? Both   []??? Other:           Transverse plane:     [x]? ?? Shoulder:   []??? Elbow:    []??? Wrist:  []??? Hand:    []??? Fingers:              Exercises: HEP: AAROM of shoulder flexion and abduction, with or without cane, no other weight. ON HOLD THIS DATE OF 2/13/2020       Assessment:   Pt tolerated treatment well. Patient reports a reduction in pain at the end of the treatment. Patient was instructed to perform 1 set of 10 repetitions of the HEP. The patient went home and performed 1 set of 10, 4 or 5 times spread out through the day and was \"almost unable to move my arm the next day. \"  Re-educated to perform 1 set of 10 per day only for 3 days and if no pain is present then to perform 2 sets of 10 1x per day. But as of right now she is to discontinue the HEP and utilize a heating pad on the arm at home to increase comfort and decrease pain. Patient acknowledged she understood. Plan:   Continue with POC.     Collaborated with OTR after this

## 2020-02-21 ENCOUNTER — HOSPITAL ENCOUNTER (OUTPATIENT)
Dept: OCCUPATIONAL THERAPY | Age: 65
Setting detail: THERAPIES SERIES
Discharge: HOME | End: 2020-02-21

## 2020-02-21 PROCEDURE — 97140 MANUAL THERAPY 1/> REGIONS: CPT

## 2020-02-21 PROCEDURE — 97530 THERAPEUTIC ACTIVITIES: CPT

## 2020-02-21 NOTE — PROGRESS NOTES
Occupational Therapy  Daily Treatment Note  Date: 2020  Patient Name: Praneeth Andrade  :  1955  MRN: [id number]       Subjective Pt reports she is scheduled to return back to work on Monday. 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 Pt likely is required to lift and carry with both hands. Pt is concerned about returning to work with restrictions. Pt reports that she was told not to do any lifting, but no restrictions were noted on referral for evaluation. OT Visit Information  Progress Note Counter:   Pre Treatment Pain Screening  Pain at present: 3  Scale Used: Numeric Score   Treatment Activities:   Pt has greatly increased pain with any attempts at IR and ABD of shoulder             Shoulder      MMT     Left    Active   Left   Passive                   Normal   Flexion  100  0-180   Abduction See below 45 45  0-180   Int. Rot.  20 20   0-80   Ext.  Rot.  20 20   0-60    Elbow       Flexion  130 150  0-150   Extension  -15 0  150-0   Pronation  WNL WNL   0-80   Supination  WNL WNL   0-80      Cannot MMT at this time due to pain with establishing position             Pt was treated with MFR to address excessive restriction of soft tissue throughout L scapula, L upper traps, L Pect major and L Pect minor                 Deep Release L upper traps deep release       Thoracic     Deep Release:    [] Anterior chest:   [] Horizontal thoracic:     [x] Pectoralis major            [] Right  [x] Left    [] Both     [x] Pectoralis minor           [] Right   [x] Left   [] Both         [x] Horizontal pectoral:  L [x] Vertical pectoral:  L   [] Thoracic paraspinals            [] Right  [] Left   [] Both:     [] Lateral Obliques            [] Right   [] Left    [] Both       [] Soft tissue mobilization:       [] [place]  [] Vertical stroke   [] Bear claw   [] Skin roll:        Transverse Plane:     [x] Thoracic inlet:  [] Respiratory diaphragm:    [] Other:            Deep releases: [x] Biceps           [] Right  [x] Left    [] Both   [] Triceps            [] Right   [] Left    [] Both     [] Forearm flexors           [] Right  [] Left   [] Both    [] Forearm extensors          [] Right   [] Left   [] Both      [] Palm           [] Right  [] Left   [] Both   [] Other:                  Transverse plane:    [x] Shoulder:  L [x] Elbow: L   [] Wrist:  [] Hand:    [] Fingers:         Pt has difficulty sit to supine  From the L due to pain. Supine increased pain initially which resolved as treatment progressed              Assessment Pt reported \"more comfortable \" after tx and decreased pain in the L upper trap area. Pt is growing impatient with the healing process and currently limitations due to pain and immobility     Post Treatment Pain Screening  Pain at present: 2         Plan Pt to see referring physician on Monday 2-  To discuss Pt's return to work . Pt has not been actively lifting due to precaution from referring Physician and pain with accidental attempts. Pt till has decreased AROM and PROM  And likely has decreased functional strength die to decreased use of her L arm                         Goals : As per tx plan. Pt again is limited by current available AROM /PROM and decreased strength. Pt would benefit from completing current tx plan and possible added sessions depending on progress.        Therapy Time   Individual Concurrent Group Co-treatment   Time In  0         Time Out  905         Minutes  60              Electronically signed by JULIANNA Raphael/L on 2/21/2020 at 9:35 AM    JULIANNA Raphael/KIANA

## 2020-02-24 ENCOUNTER — OFFICE VISIT (OUTPATIENT)
Dept: ORTHOPEDIC SURGERY | Age: 65
End: 2020-02-24

## 2020-02-24 VITALS
OXYGEN SATURATION: 97 % | WEIGHT: 165 LBS | TEMPERATURE: 97 F | HEART RATE: 88 BPM | HEIGHT: 63 IN | BODY MASS INDEX: 29.23 KG/M2

## 2020-02-24 PROCEDURE — 99024 POSTOP FOLLOW-UP VISIT: CPT | Performed by: ORTHOPAEDIC SURGERY

## 2020-02-24 NOTE — PATIENT INSTRUCTIONS
Recommend to continue physical therapy with range of motion and strengthening exercises. We discussed that if she would decrease smoking if she would she most likely would get better sooner. She would like to go back to work, for 3 hours a day.

## 2020-02-24 NOTE — PROGRESS NOTES
Subjective:      Patient ID: Kailash Perkins is a 59 y.o. female who presents today for:  Chief Complaint   Patient presents with    Follow-up     Evaluation fracture healing and fracture alignment of left shoulder. pt stated she stopped wearing shoulder sling a couple weeks ago per PT        HPI:    Antonio has been doing physical therapy and range of motion exercises. She was able to stop smoking for approximately 10 days. Allergies   Allergen Reactions    Pcn [Penicillins] Hives    Tramadol Itching     Itch and nightmares    Hydrocodone-Acetaminophen Itching    Vicodin [Hydrocodone-Acetaminophen] Itching     Current Outpatient Medications on File Prior to Visit   Medication Sig Dispense Refill    atorvastatin (LIPITOR) 10 MG tablet Take 1 tablet by mouth daily 30 tablet 3    sertraline (ZOLOFT) 100 MG tablet take 1 tablet by mouth once daily 30 tablet 3    levothyroxine (SYNTHROID) 125 MCG tablet Take 1 tablet by mouth daily 30 tablet 3    naproxen (NAPROSYN) 500 MG tablet Take 1 tablet by mouth 2 times daily (with meals) 60 tablet 0    vitamin E 1000 UNIT capsule Take 1,000 Units by mouth daily.  vitamin D (CHOLECALCIFEROL) 400 UNIT TABS tablet Take 400 Units by mouth 3 times daily. No current facility-administered medications on file prior to visit. Acute and Chronic Pain Monitoring:   No flowsheet data found. Objective--Physical Examination:     Pulse 88   Temp 97 °F (36.1 °C) (Temporal)   Ht 5' 3\" (1.6 m)   Wt 165 lb (74.8 kg)   LMP  (LMP Unknown)   SpO2 97%   BMI 29.23 kg/m²     Physical Examination:   Ortho Exam  Ms. Alvarez on exam was a pleasant 54-year-old woman, not upper chest.  She can actively abduct her shoulder 90 degrees. Passively shoulder abduction 135 degrees and external rotation 45 degrees. Radiographs and Laboratory Studies:   None      Procedures Performed Today:     None    Assessment / Plan:      Diagnosis Orders   1.  Closed

## 2020-02-25 ENCOUNTER — HOSPITAL ENCOUNTER (OUTPATIENT)
Dept: OCCUPATIONAL THERAPY | Age: 65
Setting detail: THERAPIES SERIES
Discharge: HOME | End: 2020-02-25

## 2020-02-25 NOTE — PROGRESS NOTES
Therapy                                         Cancellation    Date: 2020  Patient Name: Lavern River    : 1955  (59 y.o.)     MRN: [id number]    Account #: [de-identified]       Canceled Appointment: 2    For today's appointment patient:  [x]  Cancelled  []  Rescheduled appointment  []  No-show   []  Called pt to remind of next appointment     Reason given by patient:  []  Patient ill  []  Conflicting appointment  []  No transportation    []  Conflict with work  []  No reason given  [x]  Other:  Patient reports she went back to work and will call. [] Pt has future appointments scheduled, no follow up needed  [] Pt requests to be on hold. Reason:   If > 2 weeks please discuss with therapist.  [x] Therapist to call pt for follow up. No further appts are scheduled at this time.    Comments:       Signature: Electronically signed by DOUG Monroy on 20 at 8:26 AM  Electronically signed by NIMA Monroy on 20 at 8:27 AM

## 2020-04-20 ENCOUNTER — VIRTUAL VISIT (OUTPATIENT)
Dept: ORTHOPEDIC SURGERY | Age: 65
End: 2020-04-20

## 2020-04-20 PROBLEM — S42.292D: Status: ACTIVE | Noted: 2020-04-20

## 2020-04-20 PROCEDURE — 99211 OFF/OP EST MAY X REQ PHY/QHP: CPT | Performed by: ORTHOPAEDIC SURGERY

## 2020-04-20 NOTE — PROGRESS NOTES
Subjective:      Patient ID: Mahnaz Bashir is a 72 y.o. female who is being evaluated by a Virtual Visit (video visit) encounter to address concerns as mentioned above. A caregiver was present when appropriate. Due to this being a TeleHealth encounter (During LJK-30 public health emergency), evaluation of the following organ systems was limited: Vitals/Constitutional/EENT/Resp/CV/GI//MS/Neuro/Skin/Heme-Lymph-Imm. Pursuant to the emergency declaration under the [address] [place] [address], [address] [place] [address] and the [employer] Act, this Virtual Visit was conducted with patient's (and/or legal guardian's) consent, to reduce the patient's risk of exposure to COVID-19 and provide necessary medical care. The patient (and/or legal guardian) has also been advised to contact this office for worsening conditions or problems, and seek emergency medical treatment and/or call 911 if deemed necessary. Services were provided through a video synchronous discussion virtually to substitute for in-person clinic visit. Patient and provider were located at their individual homes. Mahnaz Bashir has requested an audio/video evaluation for the following concern(s):   Chief Complaint   Patient presents with    Telephone Appointment Visit     virtual visit with Dr. Farhan Limon for four week follow-up       HPI:    Marsha Wells is a 77-year-old woman, who on 12/7/2019 sustained a minimal displaced left greater tuberosity fracture. She is been doing strengthening and range of motion exercises with her friend who is a therapist.  She would like to go back to work. She is a  for [employer].       Allergies   Allergen Reactions    Pcn [Penicillins] Hives    Tramadol Itching     Itch and nightmares    Hydrocodone-Acetaminophen Itching    Vicodin [Hydrocodone-Acetaminophen] Itching     Current Outpatient Medications on File Prior to Visit Medication Sig Dispense Refill    levothyroxine (SYNTHROID) 125 MCG tablet take 1 tablet by mouth once daily 90 tablet 0    atorvastatin (LIPITOR) 10 MG tablet take 1 tablet by mouth once daily 90 tablet 0    sertraline (ZOLOFT) 100 MG tablet take 1 tablet by mouth once daily 90 tablet 0    naproxen (NAPROSYN) 500 MG tablet Take 1 tablet by mouth 2 times daily (with meals) 60 tablet 0    vitamin E 1000 UNIT capsule Take 1,000 Units by mouth daily.  vitamin D (CHOLECALCIFEROL) 400 UNIT TABS tablet Take 400 Units by mouth 3 times daily. No current facility-administered medications on file prior to visit. Acute and Chronic Pain Monitoring:   No flowsheet data found. Objective--Physical Examination:     LMP  (LMP Unknown)     Physical Examination:   Ortho Exam  Ms. Nathalia Case is a 79-year-old woman, who on the video appears healthy and interactive. She can actively abduct the shoulder 170 degrees with comfort and without any apparent difficulty. Radiographs and Laboratory Studies:     Diagnostic Imaging Studies:    None      Procedures Performed Today:     None    Assessment / Plan:      Diagnosis Orders   1. Humeral head fracture, left, with routine healing, subsequent encounter        No orders of the defined types were placed in this encounter. No orders of the defined types were placed in this encounter. Patient Instructions   At this point she is doing really well, and feels that she can go back to work. We will give her a release that she come back to work as a  for [employer]. We will follow her on an as-needed basis. No follow-ups on file. The visit was less than 10 minutes.     Celi Stovall M.D., [address] [place] [address]  Orthopaedic Surgery

## 2020-08-03 ENCOUNTER — TELEPHONE (OUTPATIENT)
Dept: FAMILY MEDICINE CLINIC | Age: 65
End: 2020-08-03

## 2020-08-12 ENCOUNTER — OFFICE VISIT (OUTPATIENT)
Dept: FAMILY MEDICINE CLINIC | Age: 65
End: 2020-08-12
Payer: MEDICARE

## 2020-08-12 VITALS
OXYGEN SATURATION: 98 % | WEIGHT: 169 LBS | SYSTOLIC BLOOD PRESSURE: 120 MMHG | TEMPERATURE: 97.3 F | BODY MASS INDEX: 29.95 KG/M2 | HEIGHT: 63 IN | HEART RATE: 74 BPM | DIASTOLIC BLOOD PRESSURE: 70 MMHG | RESPIRATION RATE: 15 BRPM

## 2020-08-12 PROCEDURE — G8427 DOCREV CUR MEDS BY ELIG CLIN: HCPCS | Performed by: FAMILY MEDICINE

## 2020-08-12 PROCEDURE — 4040F PNEUMOC VAC/ADMIN/RCVD: CPT | Performed by: FAMILY MEDICINE

## 2020-08-12 PROCEDURE — G8417 CALC BMI ABV UP PARAM F/U: HCPCS | Performed by: FAMILY MEDICINE

## 2020-08-12 PROCEDURE — G8510 SCR DEP NEG, NO PLAN REQD: HCPCS | Performed by: FAMILY MEDICINE

## 2020-08-12 PROCEDURE — 4004F PT TOBACCO SCREEN RCVD TLK: CPT | Performed by: FAMILY MEDICINE

## 2020-08-12 PROCEDURE — 3017F COLORECTAL CA SCREEN DOC REV: CPT | Performed by: FAMILY MEDICINE

## 2020-08-12 PROCEDURE — 1090F PRES/ABSN URINE INCON ASSESS: CPT | Performed by: FAMILY MEDICINE

## 2020-08-12 PROCEDURE — G8400 PT W/DXA NO RESULTS DOC: HCPCS | Performed by: FAMILY MEDICINE

## 2020-08-12 PROCEDURE — 1123F ACP DISCUSS/DSCN MKR DOCD: CPT | Performed by: FAMILY MEDICINE

## 2020-08-12 PROCEDURE — 99214 OFFICE O/P EST MOD 30 MIN: CPT | Performed by: FAMILY MEDICINE

## 2020-08-12 RX ORDER — ATORVASTATIN CALCIUM 10 MG/1
TABLET, FILM COATED ORAL
Qty: 90 TABLET | Refills: 1 | Status: CANCELLED | OUTPATIENT
Start: 2020-08-12

## 2020-08-12 RX ORDER — LEVOTHYROXINE SODIUM 0.12 MG/1
TABLET ORAL
Qty: 90 TABLET | Refills: 1 | Status: CANCELLED | OUTPATIENT
Start: 2020-08-12

## 2020-08-12 RX ORDER — SERTRALINE HYDROCHLORIDE 100 MG/1
TABLET, FILM COATED ORAL
Qty: 90 TABLET | Refills: 1 | Status: SHIPPED | OUTPATIENT
Start: 2020-08-12 | End: 2021-01-13 | Stop reason: SDUPTHER

## 2020-08-12 RX ORDER — NAPROXEN 500 MG/1
500 TABLET ORAL 2 TIMES DAILY WITH MEALS
Qty: 60 TABLET | Refills: 0 | Status: CANCELLED | OUTPATIENT
Start: 2020-08-12

## 2020-08-12 ASSESSMENT — PATIENT HEALTH QUESTIONNAIRE - PHQ9
SUM OF ALL RESPONSES TO PHQ QUESTIONS 1-9: 2
SUM OF ALL RESPONSES TO PHQ QUESTIONS 1-9: 2
1. LITTLE INTEREST OR PLEASURE IN DOING THINGS: 1
2. FEELING DOWN, DEPRESSED OR HOPELESS: 1
SUM OF ALL RESPONSES TO PHQ9 QUESTIONS 1 & 2: 2

## 2020-08-12 ASSESSMENT — ENCOUNTER SYMPTOMS
VOMITING: 0
COUGH: 0
DIARRHEA: 0

## 2020-08-12 NOTE — PROGRESS NOTES
Subjective:      Patient ID: Claire Peraza is a 72 y.o. female    HPI  Here in follow up for hypothyroidism and lipids and depression. Depression overall stable on zoloft       Review of Systems   Constitutional: Negative for chills and fever. Respiratory: Negative for cough. Cardiovascular: Negative for chest pain. Gastrointestinal: Negative for diarrhea and vomiting. Skin: Negative for rash. Neurological: Negative for weakness. Psychiatric/Behavioral: Negative for sleep disturbance. Treatment Adherence:   Medication compliance:  compliant most of the time  Diet compliance:  compliant most of the time  Weight trend: increasing  Current exercise: no regular exercise  Barriers: none    Sodium (mEq/L)   Date Value   07/10/2019 141    BUN (mg/dL)   Date Value   07/10/2019 15    Glucose (mg/dL)   Date Value   07/10/2019 85   04/20/2012 88      Potassium (mEq/L)   Date Value   07/10/2019 4.6    CREATININE (mg/dL)   Date Value   07/10/2019 0.63         Hyperlipidemia:  No new myalgias or GI upset on atorvastatin (Lipitor).      Lab Results   Component Value Date    CHOL 148 07/10/2019    TRIG 87 07/10/2019    HDL 49 07/10/2019    LDLCALC 82 07/10/2019     Lab Results   Component Value Date    ALT 18 07/10/2019    AST 18 07/10/2019        Reviewed allergy, medical, social, surgical, family and med list changes and updated   Files     Social History     Socioeconomic History    Marital status:      Spouse name: None    Number of children: None    Years of education: None    Highest education level: None   Occupational History    Occupation: loading and recieving     Employer: [employer]   Social Needs    Financial resource strain: None    Food insecurity     Worry: None     Inability: None    Transportation needs     Medical: None     Non-medical: None   Tobacco Use    Smoking status: Current Every Day Smoker     Packs/day: 0.20     Years: 10.00     Pack years: 2.00     Types: Cigarettes  Smokeless tobacco: Never Used   Substance and Sexual Activity    Alcohol use: No    Drug use: No    Sexual activity: None   Lifestyle    Physical activity     Days per week: None     Minutes per session: None    Stress: None   Relationships    Social connections     Talks on phone: None     Gets together: None     Attends Gnosticism service: None     Active member of club or organization: None     Attends meetings of clubs or organizations: None     Relationship status: None    Intimate partner violence     Fear of current or ex partner: None     Emotionally abused: None     Physically abused: None     Forced sexual activity: None   Other Topics Concern    None   Social History Narrative    None     Current Outpatient Medications   Medication Sig Dispense Refill    levothyroxine (SYNTHROID) 125 MCG tablet take 1 tablet by mouth once daily 90 tablet 0    sertraline (ZOLOFT) 100 MG tablet take 1 tablet by mouth once daily 90 tablet 0    atorvastatin (LIPITOR) 10 MG tablet take 1 tablet by mouth once daily 90 tablet 0    naproxen (NAPROSYN) 500 MG tablet Take 1 tablet by mouth 2 times daily (with meals) 60 tablet 0    vitamin E 1000 UNIT capsule Take 1,000 Units by mouth daily.  vitamin D (CHOLECALCIFEROL) 400 UNIT TABS tablet Take 400 Units by mouth 3 times daily. No current facility-administered medications for this visit. Family History   Problem Relation Age of Onset    Cancer Father      Past Medical History:   Diagnosis Date    Depression     Hyperlipidemia     Hypothyroidism      Objective:   /70   Pulse 74   Temp 97.3 °F (36.3 °C) (Tympanic)   Resp 15   Ht 5' 3\" (1.6 m)   Wt 169 lb (76.7 kg)   LMP  (LMP Unknown)   SpO2 98%   BMI 29.94 kg/m²     Physical Exam  Neck:no carotid bruits. No masses. No adenopathy. No thyroid asymmetry. Lungs:clear and equal breath sounds. No wheezes or rales. Heart:rate reg. No murmur.   No gallops   Pulses:Radials 2+ equal               Poster tib 1+ equal  Extremities:no edema in either leg  Gen: In no acute distress  Abdomen; B.S present. Soft  Non tender. No hepatosplenomegaly. No masses   Patient with appropriate affect. Alert  Thought content appropriate  Good eye contact    Assessment:       Diagnosis Orders   1. Hypothyroidism, unspecified type     2. Depression, unspecified depression type     3. Mixed hyperlipidemia     4.  Encounter for screening mammogram for malignant neoplasm of breast  FELIX DIGITAL SCREEN W OR WO CAD BILATERAL         Plan:      Orders Placed This Encounter   Procedures    FELIX DIGITAL SCREEN W OR WO CAD BILATERAL     Standing Status:   Future     Standing Expiration Date:   8/12/2021     Order Specific Question:   Reason for exam:     Answer:   v76.12   continue current meds for now   Fasting blood work soon and f/u after above

## 2020-08-18 DIAGNOSIS — E78.2 MIXED HYPERLIPIDEMIA: ICD-10-CM

## 2020-08-18 DIAGNOSIS — E03.9 HYPOTHYROIDISM, UNSPECIFIED TYPE: ICD-10-CM

## 2020-08-18 LAB
ALBUMIN SERPL-MCNC: 4.2 G/DL (ref 3.5–4.6)
ALP BLD-CCNC: 77 U/L (ref 40–130)
ALT SERPL-CCNC: 21 U/L (ref 0–33)
ANION GAP SERPL CALCULATED.3IONS-SCNC: 8 MEQ/L (ref 9–15)
AST SERPL-CCNC: 19 U/L (ref 0–35)
BASOPHILS ABSOLUTE: 0 K/UL (ref 0–0.2)
BASOPHILS RELATIVE PERCENT: 0.5 %
BILIRUB SERPL-MCNC: 0.4 MG/DL (ref 0.2–0.7)
BUN BLDV-MCNC: 10 MG/DL (ref 8–23)
CALCIUM SERPL-MCNC: 9.1 MG/DL (ref 8.5–9.9)
CHLORIDE BLD-SCNC: 107 MEQ/L (ref 95–107)
CHOLESTEROL, TOTAL: 155 MG/DL (ref 0–199)
CO2: 25 MEQ/L (ref 20–31)
CREAT SERPL-MCNC: 0.58 MG/DL (ref 0.5–0.9)
EOSINOPHILS ABSOLUTE: 0 K/UL (ref 0–0.7)
EOSINOPHILS RELATIVE PERCENT: 0.8 %
GFR AFRICAN AMERICAN: >60
GFR NON-AFRICAN AMERICAN: >60
GLOBULIN: 2.6 G/DL (ref 2.3–3.5)
GLUCOSE BLD-MCNC: 77 MG/DL (ref 70–99)
HCT VFR BLD CALC: 42.6 % (ref 37–47)
HDLC SERPL-MCNC: 45 MG/DL (ref 40–59)
HEMOGLOBIN: 14.2 G/DL (ref 12–16)
LDL CHOLESTEROL CALCULATED: 89 MG/DL (ref 0–129)
LYMPHOCYTES ABSOLUTE: 2.1 K/UL (ref 1–4.8)
LYMPHOCYTES RELATIVE PERCENT: 32.3 %
MCH RBC QN AUTO: 33.2 PG (ref 27–31.3)
MCHC RBC AUTO-ENTMCNC: 33.4 % (ref 33–37)
MCV RBC AUTO: 99.4 FL (ref 82–100)
MONOCYTES ABSOLUTE: 0.5 K/UL (ref 0.2–0.8)
MONOCYTES RELATIVE PERCENT: 7.6 %
NEUTROPHILS ABSOLUTE: 3.8 K/UL (ref 1.4–6.5)
NEUTROPHILS RELATIVE PERCENT: 58.8 %
PDW BLD-RTO: 13.8 % (ref 11.5–14.5)
PLATELET # BLD: 237 K/UL (ref 130–400)
POTASSIUM SERPL-SCNC: 5.1 MEQ/L (ref 3.4–4.9)
RBC # BLD: 4.28 M/UL (ref 4.2–5.4)
SODIUM BLD-SCNC: 140 MEQ/L (ref 135–144)
TOTAL PROTEIN: 6.8 G/DL (ref 6.3–8)
TRIGL SERPL-MCNC: 107 MG/DL (ref 0–150)
TSH SERPL DL<=0.05 MIU/L-ACNC: 0.1 UIU/ML (ref 0.44–3.86)
WBC # BLD: 6.5 K/UL (ref 4.8–10.8)

## 2020-08-25 ENCOUNTER — TELEPHONE (OUTPATIENT)
Dept: FAMILY MEDICINE CLINIC | Age: 65
End: 2020-08-25

## 2020-08-27 ENCOUNTER — TELEPHONE (OUTPATIENT)
Dept: FAMILY MEDICINE CLINIC | Age: 65
End: 2020-08-27

## 2020-08-27 NOTE — TELEPHONE ENCOUNTER
Dr. Uri Duran called and stated that he still can not get in contact with patient. He said to inform her that her Blood work came back good but she is getting to much synthroid so he needs to lower her dose to 112 mcg and recheck tsh in 8 weeks and follow up after that in person.

## 2020-08-27 NOTE — TELEPHONE ENCOUNTER
Asya Morel in pre-service called to state that she had just spoken with [facility] who was very upset, and indicated that she had two appointments scheduled - 8/25 and 8/26 - and that no one called her. She indicated to Asya Morel that her phone cannot take \"restrictive\" calls. Jabier Osuna know that we do not know which phone number the providers will be calling from for VV appts as they can be calling from the office, a personal phone, a home phone, etc., and that we do not provide numbers from which the providers will be calling, in the event it is a personal number and not a [employer]. Asya Morel stated that she told the patient that same information and if she sees a strange number pop up on her phone she should answer it. She indicated that the patient stated she is completely out of medication and that she is starting to sweat. I asked which medication as she has multiple meds listed, and Asya Adriel stated that the patient was very upset and so she didn't want to push the issue. Asya Morel scheduled a 10:15 VV for today, and told the patient to answer if it's an unknown number. I tried to contact the patient and reached her vmail where I left a message indicating the provider should be calling her around the time of her scheduled appt, and to answer the call from any unknown numbers to be able to have the appt w/the provider.

## 2020-08-28 ENCOUNTER — OFFICE VISIT (OUTPATIENT)
Dept: FAMILY MEDICINE CLINIC | Age: 65
End: 2020-08-28
Payer: MEDICARE

## 2020-08-28 VITALS
RESPIRATION RATE: 16 BRPM | HEIGHT: 63 IN | OXYGEN SATURATION: 97 % | DIASTOLIC BLOOD PRESSURE: 78 MMHG | TEMPERATURE: 98.3 F | SYSTOLIC BLOOD PRESSURE: 126 MMHG | BODY MASS INDEX: 29.59 KG/M2 | WEIGHT: 167 LBS | HEART RATE: 77 BPM

## 2020-08-28 PROCEDURE — G8417 CALC BMI ABV UP PARAM F/U: HCPCS | Performed by: PHYSICIAN ASSISTANT

## 2020-08-28 PROCEDURE — 4040F PNEUMOC VAC/ADMIN/RCVD: CPT | Performed by: PHYSICIAN ASSISTANT

## 2020-08-28 PROCEDURE — 4004F PT TOBACCO SCREEN RCVD TLK: CPT | Performed by: PHYSICIAN ASSISTANT

## 2020-08-28 PROCEDURE — 1090F PRES/ABSN URINE INCON ASSESS: CPT | Performed by: PHYSICIAN ASSISTANT

## 2020-08-28 PROCEDURE — G8400 PT W/DXA NO RESULTS DOC: HCPCS | Performed by: PHYSICIAN ASSISTANT

## 2020-08-28 PROCEDURE — 3017F COLORECTAL CA SCREEN DOC REV: CPT | Performed by: PHYSICIAN ASSISTANT

## 2020-08-28 PROCEDURE — 1123F ACP DISCUSS/DSCN MKR DOCD: CPT | Performed by: PHYSICIAN ASSISTANT

## 2020-08-28 PROCEDURE — G8427 DOCREV CUR MEDS BY ELIG CLIN: HCPCS | Performed by: PHYSICIAN ASSISTANT

## 2020-08-28 PROCEDURE — 99213 OFFICE O/P EST LOW 20 MIN: CPT | Performed by: PHYSICIAN ASSISTANT

## 2020-08-28 RX ORDER — ATORVASTATIN CALCIUM 10 MG/1
TABLET, FILM COATED ORAL
Qty: 90 TABLET | Refills: 3 | Status: SHIPPED | OUTPATIENT
Start: 2020-08-28 | End: 2021-01-13 | Stop reason: SDUPTHER

## 2020-08-28 RX ORDER — LEVOTHYROXINE SODIUM 112 UG/1
TABLET ORAL
Qty: 30 TABLET | Refills: 2 | Status: SHIPPED | OUTPATIENT
Start: 2020-08-28 | End: 2020-12-15

## 2020-08-28 ASSESSMENT — ENCOUNTER SYMPTOMS
SORE THROAT: 0
VOMITING: 0
EYE DISCHARGE: 0
NAUSEA: 0
ALLERGIC/IMMUNOLOGIC NEGATIVE: 1
CHEST TIGHTNESS: 0
EYE PAIN: 0
CONSTIPATION: 0
COUGH: 0
ABDOMINAL PAIN: 0
SINUS PAIN: 0
SHORTNESS OF BREATH: 0
SINUS PRESSURE: 0
BACK PAIN: 1
WHEEZING: 0
DIARRHEA: 0

## 2020-08-28 NOTE — PROGRESS NOTES
Subjective  Saundratigist Alvarez, 72 y.o. female presents today with:  Chief Complaint   Patient presents with    Results     Dr. Shaun Vargas patient. follow up labs done 8/18/2020.  Medication Refill     Pending. HPI  Is here to discuss labs from 8/18/2020 no complaints    Review of Systems   Constitutional: Negative for chills, fatigue and fever. HENT: Negative for congestion, ear discharge, ear pain, sinus pressure, sinus pain and sore throat. Eyes: Negative for pain and discharge. Respiratory: Negative for cough, chest tightness, shortness of breath and wheezing. Cardiovascular: Negative for chest pain and leg swelling. Gastrointestinal: Negative for abdominal pain, constipation, diarrhea, nausea and vomiting. Endocrine: Negative. Genitourinary: Negative for difficulty urinating, dysuria, frequency and urgency. Musculoskeletal: Positive for back pain. Negative for neck pain. Skin: Negative for rash. Allergic/Immunologic: Negative. Neurological: Negative for dizziness and headaches. Hematological: Negative. Psychiatric/Behavioral: Positive for agitation. Negative for hallucinations and suicidal ideas. The patient is not nervous/anxious. All other systems reviewed and are negative.         Past Medical History:   Diagnosis Date    Depression     Hyperlipidemia     Hypothyroidism      Past Surgical History:   Procedure Laterality Date    BACK SURGERY  2012     Social History     Socioeconomic History    Marital status:      Spouse name: Not on file    Number of children: Not on file    Years of education: Not on file    Highest education level: Not on file   Occupational History    Occupation: loading and recieving     Employer: [employer]   Social Needs    Financial resource strain: Not on file    Food insecurity     Worry: Not on file     Inability: Not on file    Transportation needs     Medical: Not on file     Non-medical: Not on file   Tobacco Use    Smoking status: Current Every Day Smoker     Packs/day: 0.20     Years: 10.00     Pack years: 2.00     Types: Cigarettes    Smokeless tobacco: Never Used   Substance and Sexual Activity    Alcohol use: No    Drug use: No    Sexual activity: Not on file   Lifestyle    Physical activity     Days per week: Not on file     Minutes per session: Not on file    Stress: Not on file   Relationships    Social connections     Talks on phone: Not on file     Gets together: Not on file     Attends [employer] service: Not on file     Active member of club or organization: Not on file     Attends meetings of clubs or organizations: Not on file     Relationship status: Not on file    Intimate partner violence     Fear of current or ex partner: Not on file     Emotionally abused: Not on file     Physically abused: Not on file     Forced sexual activity: Not on file   Other Topics Concern    Not on file   Social History Narrative    Not on file     Family History   Problem Relation Age of Onset    Cancer Father         Allergies   Allergen Reactions    Pcn [Penicillins] Hives    Tramadol Itching     Itch and nightmares    Hydrocodone-Acetaminophen Itching    Vicodin [Hydrocodone-Acetaminophen] Itching     Current Outpatient Medications   Medication Sig Dispense Refill    levothyroxine (SYNTHROID) 112 MCG tablet take 1 tablet by mouth once daily 30 tablet 2    atorvastatin (LIPITOR) 10 MG tablet take 1 tablet by mouth once daily 90 tablet 3    sertraline (ZOLOFT) 100 MG tablet 1 po q day 90 tablet 1    vitamin E 1000 UNIT capsule Take 1,000 Units by mouth daily.  vitamin D (CHOLECALCIFEROL) 400 UNIT TABS tablet Take 400 Units by mouth 3 times daily. No current facility-administered medications for this visit.         Objective    Vitals:    08/28/20 1423   BP: 126/78   Site: Right Upper Arm   Position: Sitting   Cuff Size: Large Adult   Pulse: 77   Resp: 16   Temp: 98.3 °F (36.8 °C)   TempSrc: Oral SpO2: 97%   Weight: 167 lb (75.8 kg)   Height: 5' 3\" (1.6 m)     Physical Exam  Constitutional:       Comments: overweight   Eyes:      Extraocular Movements: Extraocular movements intact. Conjunctiva/sclera: Conjunctivae normal.   Neck:      Musculoskeletal: Normal range of motion and neck supple. Cardiovascular:      Rate and Rhythm: Normal rate and regular rhythm. Pulmonary:      Effort: Pulmonary effort is normal. No respiratory distress. Breath sounds: No wheezing. Musculoskeletal:      Right lower leg: No edema. Left lower leg: No edema. Lymphadenopathy:      Cervical: No cervical adenopathy. Skin:     General: Skin is warm and dry. Neurological:      Mental Status: She is alert and oriented to person, place, and time. Comments: Antalgic gait   Psychiatric:         Mood and Affect: Mood normal.         Behavior: Behavior normal.         Thought Content: Thought content normal.         Judgment: Judgment normal.              Assessment & Plan    Diagnosis Orders   1. Hypothyroidism, unspecified type  levothyroxine (SYNTHROID) 112 MCG tablet   2. Mixed hyperlipidemia  atorvastatin (LIPITOR) 10 MG tablet         No orders of the defined types were placed in this encounter. Orders Placed This Encounter   Medications    levothyroxine (SYNTHROID) 112 MCG tablet     Sig: take 1 tablet by mouth once daily     Dispense:  30 tablet     Refill:  2    atorvastatin (LIPITOR) 10 MG tablet     Sig: take 1 tablet by mouth once daily     Dispense:  90 tablet     Refill:  3     Medications Discontinued During This Encounter   Medication Reason    levothyroxine (SYNTHROID) 125 MCG tablet REORDER    atorvastatin (LIPITOR) 10 MG tablet REORDER     Return in about 2 months (around 10/28/2020) for follow up with your PCP as directed, repeat labs.     Vikki Portillo PA-C

## 2020-08-31 ENCOUNTER — TELEPHONE (OUTPATIENT)
Dept: FAMILY MEDICINE CLINIC | Age: 65
End: 2020-08-31

## 2020-09-03 ENCOUNTER — HOSPITAL ENCOUNTER (OUTPATIENT)
Dept: WOMENS IMAGING | Age: 65
Discharge: HOME | End: 2020-09-05
Payer: MEDICARE

## 2020-09-03 PROCEDURE — 77067 SCR MAMMO BI INCL CAD: CPT

## 2020-11-04 ENCOUNTER — OFFICE VISIT (OUTPATIENT)
Dept: FAMILY MEDICINE CLINIC | Age: 65
End: 2020-11-04
Payer: MEDICARE

## 2020-11-04 ENCOUNTER — TELEPHONE (OUTPATIENT)
Dept: FAMILY MEDICINE CLINIC | Age: 65
End: 2020-11-04

## 2020-11-04 VITALS
DIASTOLIC BLOOD PRESSURE: 80 MMHG | HEIGHT: 62 IN | WEIGHT: 165 LBS | BODY MASS INDEX: 30.36 KG/M2 | TEMPERATURE: 98.3 F | HEART RATE: 69 BPM | SYSTOLIC BLOOD PRESSURE: 128 MMHG | OXYGEN SATURATION: 97 % | RESPIRATION RATE: 16 BRPM

## 2020-11-04 PROCEDURE — 3288F FALL RISK ASSESSMENT DOCD: CPT | Performed by: NURSE PRACTITIONER

## 2020-11-04 PROCEDURE — 4040F PNEUMOC VAC/ADMIN/RCVD: CPT | Performed by: NURSE PRACTITIONER

## 2020-11-04 PROCEDURE — G8482 FLU IMMUNIZE ORDER/ADMIN: HCPCS | Performed by: NURSE PRACTITIONER

## 2020-11-04 PROCEDURE — 3017F COLORECTAL CA SCREEN DOC REV: CPT | Performed by: NURSE PRACTITIONER

## 2020-11-04 PROCEDURE — 99214 OFFICE O/P EST MOD 30 MIN: CPT | Performed by: NURSE PRACTITIONER

## 2020-11-04 PROCEDURE — 1123F ACP DISCUSS/DSCN MKR DOCD: CPT | Performed by: NURSE PRACTITIONER

## 2020-11-04 PROCEDURE — G8427 DOCREV CUR MEDS BY ELIG CLIN: HCPCS | Performed by: NURSE PRACTITIONER

## 2020-11-04 PROCEDURE — 4004F PT TOBACCO SCREEN RCVD TLK: CPT | Performed by: NURSE PRACTITIONER

## 2020-11-04 PROCEDURE — G8400 PT W/DXA NO RESULTS DOC: HCPCS | Performed by: NURSE PRACTITIONER

## 2020-11-04 PROCEDURE — G8417 CALC BMI ABV UP PARAM F/U: HCPCS | Performed by: NURSE PRACTITIONER

## 2020-11-04 PROCEDURE — 1090F PRES/ABSN URINE INCON ASSESS: CPT | Performed by: NURSE PRACTITIONER

## 2020-11-04 RX ORDER — TIZANIDINE 4 MG/1
4 TABLET ORAL 2 TIMES DAILY PRN
Qty: 30 TABLET | Refills: 0 | Status: SHIPPED | OUTPATIENT
Start: 2020-11-04 | End: 2020-12-23 | Stop reason: ALTCHOICE

## 2020-11-04 RX ORDER — PREDNISONE 10 MG/1
TABLET ORAL
Qty: 18 TABLET | Refills: 0 | Status: SHIPPED | OUTPATIENT
Start: 2020-11-04 | End: 2020-12-23 | Stop reason: ALTCHOICE

## 2020-11-04 ASSESSMENT — ENCOUNTER SYMPTOMS
BOWEL INCONTINENCE: 0
BACK PAIN: 1

## 2020-11-04 NOTE — PROGRESS NOTES
Alcohol use: No    Drug use: No    Sexual activity: Not on file   Lifestyle    Physical activity     Days per week: Not on file     Minutes per session: Not on file    Stress: Not on file   Relationships    Social connections     Talks on phone: Not on file     Gets together: Not on file     Attends Druze service: Not on file     Active member of club or organization: Not on file     Attends meetings of clubs or organizations: Not on file     Relationship status: Not on file    Intimate partner violence     Fear of current or ex partner: Not on file     Emotionally abused: Not on file     Physically abused: Not on file     Forced sexual activity: Not on file   Other Topics Concern    Not on file   Social History Narrative    Not on file     Current Outpatient Medications on File Prior to Visit   Medication Sig Dispense Refill    levothyroxine (SYNTHROID) 112 MCG tablet take 1 tablet by mouth once daily 30 tablet 2    atorvastatin (LIPITOR) 10 MG tablet take 1 tablet by mouth once daily 90 tablet 3    sertraline (ZOLOFT) 100 MG tablet 1 po q day 90 tablet 1    vitamin E 1000 UNIT capsule Take 1,000 Units by mouth daily.  vitamin D (CHOLECALCIFEROL) 400 UNIT TABS tablet Take 400 Units by mouth 3 times daily. No current facility-administered medications on file prior to visit. Allergies:  Pcn [penicillins]; Tramadol; Hydrocodone-acetaminophen; and Vicodin [hydrocodone-acetaminophen]    Review of Systems   Constitutional: Negative for chills, fatigue and fever. Respiratory: Negative for shortness of breath. Cardiovascular: Negative for chest pain and palpitations. Gastrointestinal: Negative for bowel incontinence, constipation, diarrhea and nausea. Genitourinary: Negative for bladder incontinence, difficulty urinating, dysuria, pelvic pain and urgency. Musculoskeletal: Positive for arthralgias, back pain and myalgias.  Negative for gait problem, joint swelling, neck pain and neck stiffness. Skin: Negative for color change, rash and wound. Neurological: Negative for tingling, numbness, headaches and paresthesias. Objective:   /80 (Site: Right Upper Arm, Position: Sitting, Cuff Size: Small Adult)   Pulse 69   Temp 98.3 °F (36.8 °C) (Oral)   Resp 16   Ht 5' 2\" (1.575 m)   Wt 165 lb (74.8 kg)   LMP  (LMP Unknown)   SpO2 97%   BMI 30.18 kg/m²     Physical Exam  Constitutional:       Appearance: She is well-developed. HENT:      Head: Normocephalic. Right Ear: External ear normal.      Left Ear: External ear normal.      Nose: Nose normal.      Mouth/Throat:      Mouth: Mucous membranes are moist.      Pharynx: Oropharynx is clear. Eyes:      General:         Right eye: No discharge. Left eye: No discharge. Conjunctiva/sclera: Conjunctivae normal.   Cardiovascular:      Rate and Rhythm: Normal rate. Pulmonary:      Effort: Pulmonary effort is normal. No respiratory distress. Abdominal:      General: Bowel sounds are normal.      Palpations: Abdomen is soft. Tenderness: There is abdominal tenderness. There is no right CVA tenderness or left CVA tenderness. Negative signs include Stephens's sign. Comments: Tenderness in specific area specified. No lesions or rashes noted   Musculoskeletal:      Lumbar back: She exhibits tenderness, pain and spasm. She exhibits normal range of motion, no bony tenderness, no swelling, no edema, no deformity and no laceration. Back:    Skin:     General: Skin is warm and dry. Neurological:      Mental Status: She is alert and oriented to person, place, and time. Psychiatric:         Mood and Affect: Mood normal.         Behavior: Behavior normal.         Assessment:          Diagnosis Orders   1. At high risk for falls     2. Back strain, initial encounter  tiZANidine (ZANAFLEX) 4 MG tablet    predniSONE (DELTASONE) 10 MG tablet   3.  Right sided abdominal pain  XR ABDOMEN (KUB) (SINGLE AP VIEW)       Plan:      Orders Placed This Encounter   Procedures    XR ABDOMEN (KUB) (SINGLE AP VIEW)     Standing Status:   Future     Number of Occurrences:   1     Standing Expiration Date:   11/4/2021     Order Specific Question:   Reason for exam:     Answer:   right side back pain and right side torso/adominal pain x2 weeks intermittently          Orders Placed This Encounter   Medications    tiZANidine (ZANAFLEX) 4 MG tablet     Sig: Take 1 tablet by mouth 2 times daily as needed (muscle spasm)     Dispense:  30 tablet     Refill:  0    predniSONE (DELTASONE) 10 MG tablet     Sig: Take 3 tab po daily x3 days, take 2 tab po daily x3 days, then take 1 tab po daily x3 days     Dispense:  18 tablet     Refill:  0       Return if symptoms worsen or fail to improve. Medication as prescribed. KUB due to how it radiates up toward the front. Stretching exercises provided. F/u if not improving as expected. Reviewed with the patient: current clinicalstatus, medications, activities and diet. Side effects, adverse effects of the medication prescribedtoday, as well as treatment plan/ rationale and result expectations have been discussedwith the patient who expresses understanding and desires to proceed. Close follow upto evaluate treatment results and for coordination of care. I have reviewedthe patient's medical history in detail and updated the computerized patient record. HOLLY Chin CNP        On the basis of positive falls risk screening, assessment and plan is as follows: home safety tips provided.

## 2020-11-04 NOTE — PATIENT INSTRUCTIONS
Patient Education        Acute Low Back Pain: Exercises  Introduction  Here are some examples of typical rehabilitation exercises for your condition. Start each exercise slowly. Ease off the exercise if you start to have pain. Your doctor or physical therapist will tell you when you can start these exercises and which ones will work best for you. When you are not being active, find a comfortable position for rest. Some people are comfortable on the floor or a medium-firm bed with a small pillow under their head and another under their knees. Some people prefer to lie on their side with a pillow between their knees. Don't stay in one position for too long. Take short walks (10 to 20 minutes) every 2 to 3 hours. Avoid slopes, hills, and stairs until you feel better. Walk only distances you can manage without pain, especially leg pain. How to do the exercises  Back stretches   1. Get down on your hands and knees on the floor. 2. Relax your head and allow it to droop. Round your back up toward the ceiling until you feel a nice stretch in your upper, middle, and lower back. Hold this stretch for as long as it feels comfortable, or about 15 to 30 seconds. 3. Return to the starting position with a flat back while you are on your hands and knees. 4. Let your back sway by pressing your stomach toward the floor. Lift your buttocks toward the ceiling. 5. Hold this position for 15 to 30 seconds. 6. Repeat 2 to 4 times. Follow-up care is a key part of your treatment and safety. Be sure to make and go to all appointments, and call your doctor if you are having problems. It's also a good idea to know your test results and keep a list of the medicines you take. Where can you learn more? Go to [email or web address] org and sign in to your [employer] account. Enter V086 in the Identified box to learn more about \"Acute Low Back Pain: Exercises. \"     If you do not have an account, please click

## 2020-11-04 NOTE — TELEPHONE ENCOUNTER
Called to review results of KUB. Showed some possible gastroenteritis but I believe she said no problems with N,V,D? 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 Continue current treatment plan for now.

## 2020-11-09 ASSESSMENT — ENCOUNTER SYMPTOMS
SHORTNESS OF BREATH: 0
CONSTIPATION: 0
COLOR CHANGE: 0
DIARRHEA: 0
NAUSEA: 0

## 2020-12-02 ENCOUNTER — OFFICE VISIT (OUTPATIENT)
Dept: FAMILY MEDICINE CLINIC | Age: 65
End: 2020-12-02
Payer: MEDICARE

## 2020-12-02 VITALS
BODY MASS INDEX: 30.55 KG/M2 | RESPIRATION RATE: 14 BRPM | SYSTOLIC BLOOD PRESSURE: 130 MMHG | HEIGHT: 62 IN | WEIGHT: 166 LBS | DIASTOLIC BLOOD PRESSURE: 72 MMHG | HEART RATE: 62 BPM

## 2020-12-02 DIAGNOSIS — N39.0 URINARY TRACT INFECTION WITHOUT HEMATURIA, SITE UNSPECIFIED: ICD-10-CM

## 2020-12-02 LAB
BILIRUBIN, POC: ABNORMAL
BLOOD URINE, POC: 80
CLARITY, POC: CLEAR
COLOR, POC: YELLOW
GLUCOSE URINE, POC: ABNORMAL
KETONES, POC: ABNORMAL
LEUKOCYTE EST, POC: 500
NITRITE, POC: ABNORMAL
PH, POC: 6.5
PROTEIN, POC: 0.3
SPECIFIC GRAVITY, POC: 1020
UROBILINOGEN, POC: 3.5

## 2020-12-02 PROCEDURE — 4004F PT TOBACCO SCREEN RCVD TLK: CPT | Performed by: PHYSICIAN ASSISTANT

## 2020-12-02 PROCEDURE — 4040F PNEUMOC VAC/ADMIN/RCVD: CPT | Performed by: PHYSICIAN ASSISTANT

## 2020-12-02 PROCEDURE — 3017F COLORECTAL CA SCREEN DOC REV: CPT | Performed by: PHYSICIAN ASSISTANT

## 2020-12-02 PROCEDURE — G8417 CALC BMI ABV UP PARAM F/U: HCPCS | Performed by: PHYSICIAN ASSISTANT

## 2020-12-02 PROCEDURE — 1090F PRES/ABSN URINE INCON ASSESS: CPT | Performed by: PHYSICIAN ASSISTANT

## 2020-12-02 PROCEDURE — 81003 URINALYSIS AUTO W/O SCOPE: CPT | Performed by: PHYSICIAN ASSISTANT

## 2020-12-02 PROCEDURE — G8400 PT W/DXA NO RESULTS DOC: HCPCS | Performed by: PHYSICIAN ASSISTANT

## 2020-12-02 PROCEDURE — G8482 FLU IMMUNIZE ORDER/ADMIN: HCPCS | Performed by: PHYSICIAN ASSISTANT

## 2020-12-02 PROCEDURE — 99213 OFFICE O/P EST LOW 20 MIN: CPT | Performed by: PHYSICIAN ASSISTANT

## 2020-12-02 PROCEDURE — G8427 DOCREV CUR MEDS BY ELIG CLIN: HCPCS | Performed by: PHYSICIAN ASSISTANT

## 2020-12-02 PROCEDURE — 1123F ACP DISCUSS/DSCN MKR DOCD: CPT | Performed by: PHYSICIAN ASSISTANT

## 2020-12-02 RX ORDER — CIPROFLOXACIN 500 MG/1
500 TABLET, FILM COATED ORAL 2 TIMES DAILY
Qty: 20 TABLET | Refills: 0 | Status: SHIPPED | OUTPATIENT
Start: 2020-12-02 | End: 2020-12-12

## 2020-12-02 RX ORDER — PHENAZOPYRIDINE HYDROCHLORIDE 100 MG/1
100 TABLET, FILM COATED ORAL 3 TIMES DAILY PRN
Qty: 30 TABLET | Refills: 0 | Status: SHIPPED | OUTPATIENT
Start: 2020-12-02 | End: 2020-12-12

## 2020-12-02 ASSESSMENT — ENCOUNTER SYMPTOMS
COUGH: 0
CHEST TIGHTNESS: 0
SINUS PRESSURE: 0
DIARRHEA: 0
ABDOMINAL PAIN: 0
VOMITING: 0
TROUBLE SWALLOWING: 0
BACK PAIN: 0
SHORTNESS OF BREATH: 0

## 2020-12-02 NOTE — PROGRESS NOTES
Physically abused: Not on file     Forced sexual activity: Not on file   Other Topics Concern    Not on file   Social History Narrative    Not on file     Family History   Problem Relation Age of Onset    Cancer Father      Allergies   Allergen Reactions    Pcn [Penicillins] Hives    Tramadol Itching     Itch and nightmares    Hydrocodone-Acetaminophen Itching    Vicodin [Hydrocodone-Acetaminophen] Itching     Current Outpatient Medications   Medication Sig Dispense Refill    ciprofloxacin (CIPRO) 500 MG tablet Take 1 tablet by mouth 2 times daily for 10 days 20 tablet 0    phenazopyridine (PYRIDIUM) 100 MG tablet Take 1 tablet by mouth 3 times daily as needed for Pain 30 tablet 0    tiZANidine (ZANAFLEX) 4 MG tablet Take 1 tablet by mouth 2 times daily as needed (muscle spasm) 30 tablet 0    predniSONE (DELTASONE) 10 MG tablet Take 3 tab po daily x3 days, take 2 tab po daily x3 days, then take 1 tab po daily x3 days 18 tablet 0    levothyroxine (SYNTHROID) 112 MCG tablet take 1 tablet by mouth once daily 30 tablet 2    atorvastatin (LIPITOR) 10 MG tablet take 1 tablet by mouth once daily 90 tablet 3    sertraline (ZOLOFT) 100 MG tablet 1 po q day 90 tablet 1    vitamin E 1000 UNIT capsule Take 1,000 Units by mouth daily.  vitamin D (CHOLECALCIFEROL) 400 UNIT TABS tablet Take 400 Units by mouth 3 times daily. No current facility-administered medications for this visit. Review of Systems   Constitutional: Negative for activity change, appetite change, chills, fever and unexpected weight change. HENT: Negative for drooling, ear pain, nosebleeds, sinus pressure and trouble swallowing. Respiratory: Negative for cough, chest tightness and shortness of breath. Cardiovascular: Negative for chest pain and leg swelling. Gastrointestinal: Negative for abdominal pain, diarrhea and vomiting. Endocrine: Negative for polydipsia and polyphagia.    Genitourinary: Positive for dysuria, frequency, hematuria and urgency. Negative for flank pain. Musculoskeletal: Negative for back pain and myalgias. Skin: Negative for pallor and rash. Neurological: Negative for syncope, weakness and headaches. Hematological: Does not bruise/bleed easily. Psychiatric/Behavioral: Negative for agitation, behavioral problems, confusion and decreased concentration. All other systems reviewed and are negative. Objective:   /72   Pulse 62   Resp 14   Ht 5' 2\" (1.575 m)   Wt 166 lb (75.3 kg)   LMP  (LMP Unknown)   BMI 30.36 kg/m²     Physical Exam  Vitals signs and nursing note reviewed. Constitutional:       General: She is awake. She is not in acute distress. Appearance: Normal appearance. She is well-developed and normal weight. She is not ill-appearing, toxic-appearing or diaphoretic. Comments: No photophobia. No phonophobia. HENT:      Head: Normocephalic and atraumatic. No Seo's sign. Right Ear: External ear normal.      Left Ear: External ear normal.      Nose: Nose normal. No congestion or rhinorrhea. Mouth/Throat:      Mouth: Mucous membranes are moist.      Pharynx: Oropharynx is clear. No oropharyngeal exudate or posterior oropharyngeal erythema. Eyes:      General: No scleral icterus. Right eye: No foreign body or discharge. Left eye: No discharge. Extraocular Movements: Extraocular movements intact. Conjunctiva/sclera: Conjunctivae normal.      Left eye: No exudate. Pupils: Pupils are equal, round, and reactive to light. Neck:      Musculoskeletal: Normal range of motion and neck supple. No neck rigidity. Vascular: No JVD. Trachea: No tracheal deviation. Comments: No meningismus. Cardiovascular:      Rate and Rhythm: Normal rate and regular rhythm. Heart sounds: Normal heart sounds. Heart sounds not distant. No murmur. No friction rub. No gallop.     Pulmonary:      Effort: Pulmonary effort is normal. No respiratory distress. Breath sounds: Normal breath sounds. No stridor. No wheezing, rhonchi or rales. Chest:      Chest wall: No tenderness. Abdominal:      General: Abdomen is flat. Bowel sounds are normal. There is no distension. Palpations: Abdomen is soft. There is no mass. Tenderness: There is no abdominal tenderness. There is no right CVA tenderness, left CVA tenderness, guarding or rebound. Hernia: No hernia is present. Musculoskeletal: Normal range of motion. General: No swelling, tenderness, deformity or signs of injury. Lymphadenopathy:      Head:      Right side of head: No submental adenopathy. Left side of head: No submental adenopathy. Skin:     General: Skin is warm and dry. Capillary Refill: Capillary refill takes less than 2 seconds. Coloration: Skin is not jaundiced or pale. Findings: No bruising, erythema, lesion or rash. Neurological:      General: No focal deficit present. Mental Status: She is alert and oriented to person, place, and time. Mental status is at baseline. Cranial Nerves: No cranial nerve deficit. Sensory: No sensory deficit. Motor: No weakness. Coordination: Coordination normal.      Deep Tendon Reflexes: Reflexes are normal and symmetric. Psychiatric:         Mood and Affect: Mood normal.         Behavior: Behavior normal. Behavior is cooperative. Thought Content: Thought content normal.         Judgment: Judgment normal.         Assessment:       Diagnosis Orders   1.  Urinary tract infection without hematuria, site unspecified  POCT Urinalysis No Micro (Auto)    Culture, Urine    ciprofloxacin (CIPRO) 500 MG tablet    phenazopyridine (PYRIDIUM) 100 MG tablet     Results for POC orders placed in visit on 12/02/20   POCT Urinalysis No Micro (Auto)   Result Value Ref Range    Color, UA yellow     Clarity, UA clear     Glucose, UA POC n     Bilirubin, UA n     Ketones, UA n Spec Grav, UA 1,020     Blood, UA POC 80 (POS)     pH, UA 6.5     Protein, UA POC 0.3 (POS)     Urobilinogen, UA 3.5     Leukocytes,  (POS)     Nitrite, UA n       Plan:     Assessment & Plan   Ascencion was seen today for urinary tract infection. Diagnoses and all orders for this visit:    Urinary tract infection without hematuria, site unspecified  -     POCT Urinalysis No Micro (Auto)  -     Cancel: Culture, Urine; Future  -     Culture, Urine; Future  -     ciprofloxacin (CIPRO) 500 MG tablet; Take 1 tablet by mouth 2 times daily for 10 days  -     phenazopyridine (PYRIDIUM) 100 MG tablet; Take 1 tablet by mouth 3 times daily as needed for Pain      Orders Placed This Encounter   Procedures    Culture, Urine     Standing Status:   Future     Standing Expiration Date:   12/2/2021     Order Specific Question:   Specify (ex-cath, midstream, cysto, etc)? Answer:   mid    POCT Urinalysis No Micro (Auto)     Orders Placed This Encounter   Medications    ciprofloxacin (CIPRO) 500 MG tablet     Sig: Take 1 tablet by mouth 2 times daily for 10 days     Dispense:  20 tablet     Refill:  0    phenazopyridine (PYRIDIUM) 100 MG tablet     Sig: Take 1 tablet by mouth 3 times daily as needed for Pain     Dispense:  30 tablet     Refill:  0     There are no discontinued medications. No follow-ups on file. Reviewed with the patient/family: current clinical status & medications. Side effects of the medication prescribed today, as well as treatment plan/rationale and result expectations have been discussed with the patient/family who expresses understanding. Patient will be discharged home in stable condition. Follow up with PCP to evaluate treatment results or return if symptoms worsen or fail to improve. Discussed signs and symptoms which require immediate follow-up in ED/call to 911. Understanding verbalized.      I have reviewed the patient's medical history in detail and updated the computerized patient record.     HEATHER Jacobs

## 2020-12-02 NOTE — PATIENT INSTRUCTIONS
Patient Education        Urinary Tract Infection in Women: Care Instructions  Your Care Instructions     A urinary tract infection, or UTI, is a general term for an infection anywhere between the kidneys and the urethra (where urine comes out). Most UTIs are bladder infections. They often cause pain or burning when you urinate. UTIs are caused by bacteria and can be cured with antibiotics. Be sure to complete your treatment so that the infection goes away. Follow-up care is a key part of your treatment and safety. Be sure to make and go to all appointments, and call your doctor if you are having problems. It's also a good idea to know your test results and keep a list of the medicines you take. How can you care for yourself at home? · Take your antibiotics as directed. Do not stop taking them just because you feel better. You need to take the full course of antibiotics. · Drink extra water and other fluids for the next day or two. This may help wash out the bacteria that are causing the infection. (If you have kidney, heart, or liver disease and have to limit fluids, talk with your doctor before you increase your fluid intake.)  · Avoid drinks that are carbonated or have caffeine. They can irritate the bladder. · Urinate often. Try to empty your bladder each time. · To relieve pain, take a hot bath or lay a heating pad set on low over your lower belly or genital area. Never go to sleep with a heating pad in place. To prevent UTIs  · Drink plenty of water each day. This helps you urinate often, which clears bacteria from your system. (If you have kidney, heart, or liver disease and have to limit fluids, talk with your doctor before you increase your fluid intake.)  · Urinate when you need to. · Urinate right after you have sex. · Change sanitary pads often. · Avoid douches, bubble baths, feminine hygiene sprays, and other feminine hygiene products that have deodorants.   · After going to the bathroom, wipe from front to back. When should you call for help? Call your doctor now or seek immediate medical care if:    · Symptoms such as fever, chills, nausea, or vomiting get worse or appear for the first time.     · You have new pain in your back just below your rib cage. This is called flank pain.     · There is new blood or pus in your urine.     · You have any problems with your antibiotic medicine. Watch closely for changes in your health, and be sure to contact your doctor if:    · You are not getting better after taking an antibiotic for 2 days.     · Your symptoms go away but then come back. Where can you learn more? Go to https://[email or web address].[email or web address] org and sign in to your [employer] account. Enter P997 in the bizk.it box to learn more about \"Urinary Tract Infection in Women: Care Instructions. \"     If you do not have an account, please click on the \"Sign Up Now\" link. Current as of: June 29, 2020               Content Version: 12.6  © 2006-2020 [employer]. Care instructions adapted under license by [facility] ([facility]). If you have questions about a medical condition or this instruction, always ask your healthcare professional. [place] [address] any warranty or liability for your use of this information.

## 2020-12-05 LAB
ORGANISM: ABNORMAL
URINE CULTURE, ROUTINE: ABNORMAL

## 2020-12-16 ENCOUNTER — TELEPHONE (OUTPATIENT)
Dept: PHARMACY | Facility: CLINIC | Age: 65
End: 2020-12-16

## 2020-12-16 NOTE — TELEPHONE ENCOUNTER
CLINICAL PHARMACY: ADHERENCE REVIEW  Identified care gap per Timothy; fills at [facility]: Statin adherence    Last Office Visit: 8/28/20      ASSESSMENT  STATIN ADHERENCE  Alfredo Spicerandra: 85%  Potential Fail Date: 12/22/20    Per [facility] pharmacy:  Prescription filled on 12/12/20 and picked up 12/14/20 for 90 d/s. 2 RFs remaining. Billed through [facility]. Per [facility] Records   ATORVASTATIN TAB 10MG last filled on 8/28/20 for a 90 day supply. Billed through [facility]    Lab Results   Component Value Date    CHOL 155 08/18/2020    TRIG 107 08/18/2020    HDL 45 08/18/2020    LDLCALC 89 08/18/2020     ALT   Date Value Ref Range Status   08/18/2020 21 0 - 33 U/L Final     AST   Date Value Ref Range Status   08/18/2020 19 0 - 35 U/L Final     The 10-year ASCVD risk score (Carmelo Seay, et al., 2013) is: 10%    Values used to calculate the score:      Age: 72 years      Sex: Female      Is Non- : No      Diabetic: No      Tobacco smoker: Yes      Systolic Blood Pressure: 251 mmHg      Is BP treated: No      HDL Cholesterol: 45 mg/dL      Total Cholesterol: 155 mg/dL     PLAN    Atorvastatin picked up for 90 d/s on 12/1420. No patient outreach planned at this time.

## 2020-12-16 NOTE — TELEPHONE ENCOUNTER
CLINICAL PHARMACY CONSULT: MED RECONCILIATION/REVIEW ADDENDUM    For Pharmacy Admin Tracking Only    PHSO: Yes  Total # of Interventions Recommended: 1  - New Order #: 0 New Medication Order Reason(s):  Adherence  - Maintenance Safety Lab Monitoring #: 1  - New Therapy Lab Monitoring #: 0  Recommended intervention potential cost savings: 1  Total Interventions Accepted: 0  Time Spent (min): [address], [address] [facility]

## 2020-12-23 ENCOUNTER — OFFICE VISIT (OUTPATIENT)
Dept: FAMILY MEDICINE CLINIC | Age: 65
End: 2020-12-23
Payer: MEDICARE

## 2020-12-23 VITALS
TEMPERATURE: 98.5 F | BODY MASS INDEX: 30.55 KG/M2 | OXYGEN SATURATION: 96 % | HEART RATE: 69 BPM | WEIGHT: 166 LBS | HEIGHT: 62 IN | SYSTOLIC BLOOD PRESSURE: 130 MMHG | RESPIRATION RATE: 16 BRPM | DIASTOLIC BLOOD PRESSURE: 82 MMHG

## 2020-12-23 DIAGNOSIS — M54.50 ACUTE BILATERAL LOW BACK PAIN WITHOUT SCIATICA: Primary | ICD-10-CM

## 2020-12-23 PROCEDURE — G8417 CALC BMI ABV UP PARAM F/U: HCPCS | Performed by: NURSE PRACTITIONER

## 2020-12-23 PROCEDURE — 4040F PNEUMOC VAC/ADMIN/RCVD: CPT | Performed by: NURSE PRACTITIONER

## 2020-12-23 PROCEDURE — 3017F COLORECTAL CA SCREEN DOC REV: CPT | Performed by: NURSE PRACTITIONER

## 2020-12-23 PROCEDURE — G8482 FLU IMMUNIZE ORDER/ADMIN: HCPCS | Performed by: NURSE PRACTITIONER

## 2020-12-23 PROCEDURE — 99214 OFFICE O/P EST MOD 30 MIN: CPT | Performed by: NURSE PRACTITIONER

## 2020-12-23 PROCEDURE — G8427 DOCREV CUR MEDS BY ELIG CLIN: HCPCS | Performed by: NURSE PRACTITIONER

## 2020-12-23 PROCEDURE — 4004F PT TOBACCO SCREEN RCVD TLK: CPT | Performed by: NURSE PRACTITIONER

## 2020-12-23 PROCEDURE — G8400 PT W/DXA NO RESULTS DOC: HCPCS | Performed by: NURSE PRACTITIONER

## 2020-12-23 PROCEDURE — 96372 THER/PROPH/DIAG INJ SC/IM: CPT | Performed by: NURSE PRACTITIONER

## 2020-12-23 PROCEDURE — 1123F ACP DISCUSS/DSCN MKR DOCD: CPT | Performed by: NURSE PRACTITIONER

## 2020-12-23 PROCEDURE — 1090F PRES/ABSN URINE INCON ASSESS: CPT | Performed by: NURSE PRACTITIONER

## 2020-12-23 RX ORDER — BACLOFEN 10 MG/1
10 TABLET ORAL 2 TIMES DAILY
Qty: 14 TABLET | Refills: 0 | Status: SHIPPED | OUTPATIENT
Start: 2020-12-23 | End: 2020-12-30

## 2020-12-23 RX ORDER — PREDNISONE 10 MG/1
TABLET ORAL
Qty: 45 TABLET | Refills: 0 | Status: SHIPPED | OUTPATIENT
Start: 2020-12-23 | End: 2021-01-13 | Stop reason: ALTCHOICE

## 2020-12-23 RX ORDER — KETOROLAC TROMETHAMINE 30 MG/ML
60 INJECTION, SOLUTION INTRAMUSCULAR; INTRAVENOUS ONCE
Status: COMPLETED | OUTPATIENT
Start: 2020-12-23 | End: 2020-12-23

## 2020-12-23 RX ADMIN — KETOROLAC TROMETHAMINE 60 MG: 30 INJECTION, SOLUTION INTRAMUSCULAR; INTRAVENOUS at 13:27

## 2020-12-23 NOTE — LETTER
[facility]  [address] [address]  Phone: [phone]  Fax: [phone] [facility], APRN - CNP        December 23, 2020     Patient: Regino Evans   YOB: 1955   Date of Visit: 12/23/2020       To Whom it May Concern:    Brittaney Erickson was seen in my clinic on 12/23/2020. She may return to work on Monday 12/28/2020. If you have any questions or concerns, please don't hesitate to call.     Sincerely,         HOLLY Srinivasan - CNP

## 2020-12-23 NOTE — PROGRESS NOTES
Subjective:      Patient ID: Humera Quiroga is a 72 y.o. female who presents today for:  Chief Complaint   Patient presents with    Lower Back Pain     Patient states she feel at home yesterday, she states she tripped over the dog, she feel hit her head a closet door.         HPI      Yesterday lost her balance because her sons dog was behind her and the dog ran out and she fell  She slammed down on her back  She does have a lot of arthritis in her back   Feels better when standing  Cant hardly sit  PMH of spinal surgery in the same area of a herniated disk  She is not diabetic   Certain ways she moves she just gets jults of pain  No problems with bowel or bladder issues   No numbness or tingling int he legs       Past Medical History:   Diagnosis Date    Depression     Hyperlipidemia     Hypothyroidism      Past Surgical History:   Procedure Laterality Date    BACK SURGERY  2012     Social History     Socioeconomic History    Marital status:      Spouse name: Not on file    Number of children: Not on file    Years of education: Not on file    Highest education level: Not on file   Occupational History    Occupation: loading and recieving     Employer: [employer]   Social Needs    Financial resource strain: Not on file    Food insecurity     Worry: Not on file     Inability: Not on file    Transportation needs     Medical: Not on file     Non-medical: Not on file   Tobacco Use    Smoking status: Current Every Day Smoker     Packs/day: 0.20     Years: 10.00     Pack years: 2.00     Types: Cigarettes    Smokeless tobacco: Never Used   Substance and Sexual Activity    Alcohol use: No    Drug use: No    Sexual activity: Not on file   Lifestyle    Physical activity     Days per week: Not on file     Minutes per session: Not on file    Stress: Not on file   Relationships    Social connections     Talks on phone: Not on file     Gets together: Not on file     Attends Restoration service: Not on Arm, Position: Sitting, Cuff Size: Small Adult)   Pulse 69   Temp 98.5 °F (36.9 °C) (Oral)   Resp 16   Ht 5' 2\" (1.575 m)   Wt 166 lb (75.3 kg)   LMP  (LMP Unknown)   SpO2 96%   BMI 30.36 kg/m²     Physical Exam  Vitals signs reviewed. Constitutional:       Appearance: Normal appearance. HENT:      Head: Normocephalic and atraumatic. Nose: Nose normal.      Mouth/Throat:      Lips: Pink. Eyes:      General: Lids are normal.      Conjunctiva/sclera: Conjunctivae normal.   Neck:      Musculoskeletal: Normal range of motion. Cardiovascular:      Rate and Rhythm: Normal rate and regular rhythm. Heart sounds: Normal heart sounds. Pulmonary:      Effort: Pulmonary effort is normal.      Breath sounds: Normal breath sounds. No wheezing or rhonchi. Musculoskeletal:      Lumbar back: She exhibits decreased range of motion, pain and spasm. She exhibits no swelling and no laceration. Back:       Comments: There is pain on palpation   Skin:     General: Skin is warm and dry. Neurological:      General: No focal deficit present. Mental Status: She is alert and oriented to person, place, and time. Psychiatric:         Mood and Affect: Mood normal.         Behavior: Behavior normal. Behavior is cooperative. Assessment:       Diagnosis Orders   1. Acute bilateral low back pain without sciatica  XR LUMBAR SPINE (MIN 4 VIEWS)    predniSONE (DELTASONE) 10 MG tablet    baclofen (LIORESAL) 10 MG tablet    ketorolac (TORADOL) injection 60 mg     No results found for this visit on 12/23/20. Plan:     Assessment & Plan   Lionel Crawley was seen today for lower back pain. Diagnoses and all orders for this visit:    Acute bilateral low back pain without sciatica  -     XR LUMBAR SPINE (MIN 4 VIEWS); Future  -     predniSONE (DELTASONE) 10 MG tablet;  Take 5 tabs daily for 3 days, 4 tabs daily for 3 days, 3 tabs daily for 3 days, 2 tabs daily for 3 days, 1 tab daily for 3 days  -     baclofen (LIORESAL) 10 MG tablet; Take 1 tablet by mouth 2 times daily for 7 days  -     ketorolac (TORADOL) injection 60 mg      Orders Placed This Encounter   Procedures    XR LUMBAR SPINE (MIN 4 VIEWS)     Standing Status:   Future     Number of Occurrences:   1     Standing Expiration Date:   1/23/2021     Order Specific Question:   Reason for exam:     Answer:   back pain after a fall     Orders Placed This Encounter   Medications    predniSONE (DELTASONE) 10 MG tablet     Sig: Take 5 tabs daily for 3 days, 4 tabs daily for 3 days, 3 tabs daily for 3 days, 2 tabs daily for 3 days, 1 tab daily for 3 days     Dispense:  45 tablet     Refill:  0    baclofen (LIORESAL) 10 MG tablet     Sig: Take 1 tablet by mouth 2 times daily for 7 days     Dispense:  14 tablet     Refill:  0    ketorolac (TORADOL) injection 60 mg     Medications Discontinued During This Encounter   Medication Reason    predniSONE (DELTASONE) 10 MG tablet Therapy completed    tiZANidine (ZANAFLEX) 4 MG tablet Therapy completed     Return for Follow up with PCP. Reviewed with the patient/family: current clinical status & medications. Side effects of the medication prescribed today, as well as treatment plan/rationale and result expectations have been discussed with the patient/family who expresses understanding. Patient will be discharged home in stable condition. Follow up with PCP to evaluate treatment results or return if symptoms worsen or fail to improve. Discussed signs and symptoms which require immediate follow-up in ED/call to 911. Understanding verbalized. I have reviewed the patient's medical history in detail and updated the computerized patient record.     Anshul Solano, APRN - CNP

## 2020-12-23 NOTE — PATIENT INSTRUCTIONS
hours. Put a thin cloth between the ice pack and your skin. · Take pain medicines exactly as directed. ? If the doctor gave you a prescription medicine for pain, take it as prescribed. ? If you are not taking a prescription pain medicine, ask your doctor if you can take an over-the-counter medicine. · Take short walks several times a day. You can start with 5 to 10 minutes, 3 or 4 times a day, and work up to longer walks. Walk on level surfaces and avoid hills and stairs until your back is better. · Return to work and other activities as soon as you can. Continued rest without activity is usually not good for your back. · To prevent future back pain, do exercises to stretch and strengthen your back and stomach. Learn how to use good posture, safe lifting techniques, and proper body mechanics. When should you call for help? Call your doctor now or seek immediate medical care if:    · You have new or worsening numbness in your legs.     · You have new or worsening weakness in your legs. (This could make it hard to stand up.)     · You lose control of your bladder or bowels. Watch closely for changes in your health, and be sure to contact your doctor if:    · You have a fever, lose weight, or don't feel well.     · You do not get better as expected. Where can you learn more? Go to https://Spotigo.Pinion.gg. org and sign in to your [employer] account. Enter M619 in the [facility] box to learn more about \"Back Pain: Care Instructions. \"     If you do not have an account, please click on the \"Sign Up Now\" link. Current as of: March 2, 2020               Content Version: 12.6  © 2552-4409 [employer]. Care instructions adapted under license by [facility] ([facility]). If you have questions about a medical condition or this instruction, always ask your healthcare professional. [place] [address] any warranty or liability for your use of this information.          Patient Education        Low Back Pain: Exercises  Introduction  Here are some examples of exercises for you to try. The exercises may be suggested for a condition or for rehabilitation. Start each exercise slowly. Ease off the exercises if you start to have pain. You will be told when to start these exercises and which ones will work best for you. How to do the exercises  Press-up   1. Lie on your stomach, supporting your body with your forearms. 2. Press your elbows down into the floor to raise your upper back. As you do this, relax your stomach muscles and allow your back to arch without using your back muscles. As your press up, do not let your hips or pelvis come off the floor. 3. Hold for 15 to 30 seconds, then relax. 4. Repeat 2 to 4 times. Alternate arm and leg (bird dog) exercise   Do this exercise slowly. Try to keep your body straight at all times, and do not let one hip drop lower than the other. 1. Start on the floor, on your hands and knees. 2. Tighten your belly muscles. 3. Raise one leg off the floor, and hold it straight out behind you. Be careful not to let your hip drop down, because that will twist your trunk. 4. Hold for about 6 seconds, then lower your leg and switch to the other leg. 5. Repeat 8 to 12 times on each leg. 6. Over time, work up to holding for 10 to 30 seconds each time. 7. If you feel stable and secure with your leg raised, try raising the opposite arm straight out in front of you at the same time. Knee-to-chest exercise   1. Lie on your back with your knees bent and your feet flat on the floor. 2. Bring one knee to your chest, keeping the other foot flat on the floor (or keeping the other leg straight, whichever feels better on your lower back). 3. Keep your lower back pressed to the floor. Hold for at least 15 to 30 seconds. 4. Relax, and lower the knee to the starting position. 5. Repeat with the other leg. Repeat 2 to 4 times with each leg.   6. To get more stretch, put your other leg flat on the floor while pulling your knee to your chest.    Curl-ups   1. Lie on the floor on your back with your knees bent at a 90-degree angle. Your feet should be flat on the floor, about 12 inches from your buttocks. 2. Cross your arms over your chest. If this bothers your neck, try putting your hands behind your neck (not your head), with your elbows spread apart. 3. Slowly tighten your belly muscles and raise your shoulder blades off the floor. 4. Keep your head in line with your body, and do not press your chin to your chest.  5. Hold this position for 1 or 2 seconds, then slowly lower yourself back down to the floor. 6. Repeat 8 to 12 times. Pelvic tilt exercise   1. Lie on your back with your knees bent. 2. \"Brace\" your stomach. This means to tighten your muscles by pulling in and imagining your belly button moving toward your spine. You should feel like your back is pressing to the floor and your hips and pelvis are rocking back. 3. Hold for about 6 seconds while you breathe smoothly. 4. Repeat 8 to 12 times. Heel dig bridging   1. Lie on your back with both knees bent and your ankles bent so that only your heels are digging into the floor. Your knees should be bent about 90 degrees. 2. Then push your heels into the floor, squeeze your buttocks, and lift your hips off the floor until your shoulders, hips, and knees are all in a straight line. 3. Hold for about 6 seconds as you continue to breathe normally, and then slowly lower your hips back down to the floor and rest for up to 10 seconds. 4. Do 8 to 12 repetitions. Hamstring stretch in doorway   1. Lie on your back in a doorway, with one leg through the open door. 2. Slide your leg up the wall to straighten your knee. You should feel a gentle stretch down the back of your leg. 3. Hold the stretch for at least 15 to 30 seconds. Do not arch your back, point your toes, or bend either knee.  Keep one heel touching the floor and the other heel touching the wall. 4. Repeat with your other leg. 5. Do 2 to 4 times for each leg. Hip flexor stretch   1. Kneel on the floor with one knee bent and one leg behind you. Place your forward knee over your foot. Keep your other knee touching the floor. 2. Slowly push your hips forward until you feel a stretch in the upper thigh of your rear leg. 3. Hold the stretch for at least 15 to 30 seconds. Repeat with your other leg. 4. Do 2 to 4 times on each side. Wall sit   1. Stand with your back 10 to 12 inches away from a wall. 2. Lean into the wall until your back is flat against it. 3. Slowly slide down until your knees are slightly bent, pressing your lower back into the wall. 4. Hold for about 6 seconds, then slide back up the wall. 5. Repeat 8 to 12 times. Follow-up care is a key part of your treatment and safety. Be sure to make and go to all appointments, and call your doctor if you are having problems. It's also a good idea to know your test results and keep a list of the medicines you take. Where can you learn more? Go to https://InCarda TherapeuticspemSpot.Vacatia. org and sign in to your LocalSort account. Enter Y080 in the Flite box to learn more about \"Low Back Pain: Exercises. \"     If you do not have an account, please click on the \"Sign Up Now\" link. Current as of: March 2, 2020               Content Version: 12.6  © 2006-2020 [employer]. Care instructions adapted under license by [facility] ([facility]). If you have questions about a medical condition or this instruction, always ask your healthcare professional. [place] [address] any warranty or liability for your use of this information. Patient Education        Acute Low Back Pain: Exercises  Introduction  Here are some examples of typical rehabilitation exercises for your condition. Start each exercise slowly.  Ease off the exercise if you start to have pain.  Your doctor or physical therapist will tell you when you can start these exercises and which ones will work best for you. When you are not being active, find a comfortable position for rest. Some people are comfortable on the floor or a medium-firm bed with a small pillow under their head and another under their knees. Some people prefer to lie on their side with a pillow between their knees. Don't stay in one position for too long. Take short walks (10 to 20 minutes) every 2 to 3 hours. Avoid slopes, hills, and stairs until you feel better. Walk only distances you can manage without pain, especially leg pain. How to do the exercises  Back stretches   1. Get down on your hands and knees on the floor. 2. Relax your head and allow it to droop. Round your back up toward the ceiling until you feel a nice stretch in your upper, middle, and lower back. Hold this stretch for as long as it feels comfortable, or about 15 to 30 seconds. 3. Return to the starting position with a flat back while you are on your hands and knees. 4. Let your back sway by pressing your stomach toward the floor. Lift your buttocks toward the ceiling. 5. Hold this position for 15 to 30 seconds. 6. Repeat 2 to 4 times. Follow-up care is a key part of your treatment and safety. Be sure to make and go to all appointments, and call your doctor if you are having problems. It's also a good idea to know your test results and keep a list of the medicines you take. Where can you learn more? Go to https://Oculus VRruth.Gini. org and sign in to your Oh My Green! account. Enter J966 in the [facility] box to learn more about \"Acute Low Back Pain: Exercises. \"     If you do not have an account, please click on the \"Sign Up Now\" link. Current as of: March 2, 2020               Content Version: 12.6  © 8059-6037 [employer]. Care instructions adapted under license by [address]  If you have questions about a medical condition or this instruction, always ask your healthcare professional. [place] [address] any warranty or liability for your use of this information.

## 2020-12-29 ASSESSMENT — ENCOUNTER SYMPTOMS
SHORTNESS OF BREATH: 0
SORE THROAT: 0
COUGH: 0
RHINORRHEA: 0
WHEEZING: 0
BACK PAIN: 1
VOMITING: 0
DIARRHEA: 0
NAUSEA: 0

## 2021-01-13 ENCOUNTER — OFFICE VISIT (OUTPATIENT)
Dept: FAMILY MEDICINE CLINIC | Age: 66
End: 2021-01-13
Payer: MEDICARE

## 2021-01-13 VITALS
WEIGHT: 166 LBS | TEMPERATURE: 97.9 F | RESPIRATION RATE: 14 BRPM | HEART RATE: 64 BPM | HEIGHT: 62 IN | SYSTOLIC BLOOD PRESSURE: 120 MMHG | OXYGEN SATURATION: 97 % | DIASTOLIC BLOOD PRESSURE: 82 MMHG | BODY MASS INDEX: 30.55 KG/M2

## 2021-01-13 DIAGNOSIS — F32.A DEPRESSION, UNSPECIFIED DEPRESSION TYPE: ICD-10-CM

## 2021-01-13 DIAGNOSIS — E03.9 HYPOTHYROIDISM, UNSPECIFIED TYPE: ICD-10-CM

## 2021-01-13 DIAGNOSIS — E03.9 HYPOTHYROIDISM, UNSPECIFIED TYPE: Primary | ICD-10-CM

## 2021-01-13 DIAGNOSIS — E78.2 MIXED HYPERLIPIDEMIA: ICD-10-CM

## 2021-01-13 LAB — TSH SERPL DL<=0.05 MIU/L-ACNC: 2.48 UIU/ML (ref 0.44–3.86)

## 2021-01-13 PROCEDURE — 99214 OFFICE O/P EST MOD 30 MIN: CPT | Performed by: FAMILY MEDICINE

## 2021-01-13 PROCEDURE — 1090F PRES/ABSN URINE INCON ASSESS: CPT | Performed by: FAMILY MEDICINE

## 2021-01-13 PROCEDURE — G8400 PT W/DXA NO RESULTS DOC: HCPCS | Performed by: FAMILY MEDICINE

## 2021-01-13 PROCEDURE — G8417 CALC BMI ABV UP PARAM F/U: HCPCS | Performed by: FAMILY MEDICINE

## 2021-01-13 PROCEDURE — G8510 SCR DEP NEG, NO PLAN REQD: HCPCS | Performed by: FAMILY MEDICINE

## 2021-01-13 PROCEDURE — G8427 DOCREV CUR MEDS BY ELIG CLIN: HCPCS | Performed by: FAMILY MEDICINE

## 2021-01-13 PROCEDURE — 4004F PT TOBACCO SCREEN RCVD TLK: CPT | Performed by: FAMILY MEDICINE

## 2021-01-13 PROCEDURE — 3017F COLORECTAL CA SCREEN DOC REV: CPT | Performed by: FAMILY MEDICINE

## 2021-01-13 PROCEDURE — 4040F PNEUMOC VAC/ADMIN/RCVD: CPT | Performed by: FAMILY MEDICINE

## 2021-01-13 PROCEDURE — G8482 FLU IMMUNIZE ORDER/ADMIN: HCPCS | Performed by: FAMILY MEDICINE

## 2021-01-13 PROCEDURE — 1123F ACP DISCUSS/DSCN MKR DOCD: CPT | Performed by: FAMILY MEDICINE

## 2021-01-13 RX ORDER — SERTRALINE HYDROCHLORIDE 100 MG/1
TABLET, FILM COATED ORAL
Qty: 90 TABLET | Refills: 1 | Status: SHIPPED | OUTPATIENT
Start: 2021-01-13 | End: 2021-04-19 | Stop reason: SDUPTHER

## 2021-01-13 RX ORDER — ATORVASTATIN CALCIUM 10 MG/1
TABLET, FILM COATED ORAL
Qty: 90 TABLET | Refills: 1 | Status: SHIPPED | OUTPATIENT
Start: 2021-01-13 | End: 2021-08-11 | Stop reason: SDUPTHER

## 2021-01-13 RX ORDER — LEVOTHYROXINE SODIUM 112 UG/1
TABLET ORAL
Qty: 30 TABLET | Refills: 0 | Status: CANCELLED | OUTPATIENT
Start: 2021-01-13

## 2021-01-13 ASSESSMENT — PATIENT HEALTH QUESTIONNAIRE - PHQ9
SUM OF ALL RESPONSES TO PHQ QUESTIONS 1-9: 0
2. FEELING DOWN, DEPRESSED OR HOPELESS: 0

## 2021-01-13 ASSESSMENT — ENCOUNTER SYMPTOMS
VOMITING: 0
DIARRHEA: 0
COUGH: 0

## 2021-01-13 NOTE — PROGRESS NOTES
Subjective:      Patient ID: Radha Esparza is a 72 y.o. female    Hyperlipidemia  This is a chronic problem. The current episode started more than 1 year ago. The problem is controlled. Pertinent negatives include no chest pain. Current antihyperlipidemic treatment includes statins. The current treatment provides moderate improvement of lipids. Mental Health Problem  This is a chronic problem. Other  Pertinent negatives include no chest pain, chills, coughing, fever, rash or vomiting. Here in follow up for lipids and hypothyroidism and depression. No weight changes since last time. Walking daily. zoloft working well for depression   Watching diet     Review of Systems   Constitutional: Negative for chills and fever. Respiratory: Negative for cough. Cardiovascular: Negative for chest pain. Gastrointestinal: Negative for diarrhea and vomiting. Skin: Negative for rash.      Reviewed allergy, medical, social, surgical, family and med list changes and updated   Files     Social History     Socioeconomic History    Marital status:      Spouse name: None    Number of children: None    Years of education: None    Highest education level: None   Occupational History    Occupation: loading and recieving     Employer: [employer]   Social Needs    Financial resource strain: None    Food insecurity     Worry: None     Inability: None    Transportation needs     Medical: None     Non-medical: None   Tobacco Use    Smoking status: Current Every Day Smoker     Packs/day: 0.20     Years: 10.00     Pack years: 2.00     Types: Cigarettes    Smokeless tobacco: Never Used   Substance and Sexual Activity    Alcohol use: No    Drug use: No    Sexual activity: None   Lifestyle    Physical activity     Days per week: None     Minutes per session: None    Stress: None   Relationships    Social connections     Talks on phone: None     Gets together: None     Attends Jainism service: None     Active member of club or organization: None     Attends meetings of clubs or organizations: None     Relationship status: None    Intimate partner violence     Fear of current or ex partner: None     Emotionally abused: None     Physically abused: None     Forced sexual activity: None   Other Topics Concern    None   Social History Narrative    None     Current Outpatient Medications   Medication Sig Dispense Refill    levothyroxine (SYNTHROID) 112 MCG tablet take 1 tablet by mouth once daily 30 tablet 0    atorvastatin (LIPITOR) 10 MG tablet take 1 tablet by mouth once daily 90 tablet 3    sertraline (ZOLOFT) 100 MG tablet 1 po q day 90 tablet 1    vitamin E 1000 UNIT capsule Take 1,000 Units by mouth daily.  vitamin D (CHOLECALCIFEROL) 400 UNIT TABS tablet Take 400 Units by mouth 3 times daily. No current facility-administered medications for this visit. Family History   Problem Relation Age of Onset    Cancer Father      Past Medical History:   Diagnosis Date    Depression     Hyperlipidemia     Hypothyroidism      Objective:   /82   Pulse 64   Temp 97.9 °F (36.6 °C)   Resp 14   Ht 5' 2\" (1.575 m)   Wt 166 lb (75.3 kg)   LMP  (LMP Unknown)   SpO2 97%   BMI 30.36 kg/m²     Physical Exam  Neck:no carotid bruits. No masses. No adenopathy. No thyroid asymmetry. Lungs:clear and equal breath sounds. No wheezes or rales. Heart:rate reg. No murmur. No gallops   Pulses:Radials 2+ equal               Poster tib 1+ equal  Extremities:no edema in either leg  Gen: In no acute distress  Abdomen; B.S present. Soft  Non tender. No hepatosplenomegaly. No masses  Patient with appropriate affect. Alert     Thought content appropriate  Good eye contact    Assessment:            Diagnosis Orders   1. Hypothyroidism, unspecified type     2. Mixed hyperlipidemia  atorvastatin (LIPITOR) 10 MG tablet   3.  Depression, unspecified depression type             Plan:      Continue current meds   Orders Placed This Encounter   Medications    atorvastatin (LIPITOR) 10 MG tablet     Sig: take 1 tablet by mouth once daily     Dispense:  90 tablet     Refill:  1    sertraline (ZOLOFT) 100 MG tablet     Si po q day     Dispense:  90 tablet     Refill:  1     Orders Placed This Encounter   Procedures    TSH without Reflex     Standing Status:   Future     Standing Expiration Date:   2022    Lipid Panel     Standing Status:   Future     Standing Expiration Date:   2022     Order Specific Question:   Is Patient Fasting?/# of Hours     Answer:   9    Comprehensive Metabolic Panel     Standing Status:   Future     Standing Expiration Date:   2022    CBC Auto Differential     Standing Status:   Future     Standing Expiration Date:   2022    TSH without Reflex     Standing Status:   Future     Standing Expiration Date:   2022   if tsh stable today-f/u after fasting blood work in 6 months

## 2021-01-18 DIAGNOSIS — E03.9 HYPOTHYROIDISM, UNSPECIFIED TYPE: ICD-10-CM

## 2021-01-18 NOTE — TELEPHONE ENCOUNTER
Patient requesting medication refill. Please approve or deny this request.    Rx requested:  Requested Prescriptions     Pending Prescriptions Disp Refills    levothyroxine (SYNTHROID) 112 MCG tablet 30 tablet 0         Last Office Visit:   1/13/2021      Next Visit Date:  No future appointments.

## 2021-01-19 RX ORDER — LEVOTHYROXINE SODIUM 112 UG/1
TABLET ORAL
Qty: 30 TABLET | Refills: 5 | Status: SHIPPED | OUTPATIENT
Start: 2021-01-19 | End: 2021-08-02

## 2021-02-05 ENCOUNTER — TELEPHONE (OUTPATIENT)
Dept: FAMILY MEDICINE CLINIC | Age: 66
End: 2021-02-05

## 2021-04-19 ENCOUNTER — OFFICE VISIT (OUTPATIENT)
Dept: FAMILY MEDICINE CLINIC | Age: 66
End: 2021-04-19
Payer: MEDICARE

## 2021-04-19 VITALS
WEIGHT: 165 LBS | TEMPERATURE: 97.9 F | RESPIRATION RATE: 14 BRPM | BODY MASS INDEX: 30.36 KG/M2 | OXYGEN SATURATION: 96 % | DIASTOLIC BLOOD PRESSURE: 78 MMHG | HEART RATE: 65 BPM | SYSTOLIC BLOOD PRESSURE: 130 MMHG | HEIGHT: 62 IN

## 2021-04-19 DIAGNOSIS — F32.A DEPRESSION, UNSPECIFIED DEPRESSION TYPE: Primary | ICD-10-CM

## 2021-04-19 DIAGNOSIS — E78.2 MIXED HYPERLIPIDEMIA: ICD-10-CM

## 2021-04-19 DIAGNOSIS — E03.9 HYPOTHYROIDISM, UNSPECIFIED TYPE: ICD-10-CM

## 2021-04-19 PROCEDURE — G8417 CALC BMI ABV UP PARAM F/U: HCPCS | Performed by: FAMILY MEDICINE

## 2021-04-19 PROCEDURE — G8427 DOCREV CUR MEDS BY ELIG CLIN: HCPCS | Performed by: FAMILY MEDICINE

## 2021-04-19 PROCEDURE — 99214 OFFICE O/P EST MOD 30 MIN: CPT | Performed by: FAMILY MEDICINE

## 2021-04-19 PROCEDURE — 3017F COLORECTAL CA SCREEN DOC REV: CPT | Performed by: FAMILY MEDICINE

## 2021-04-19 PROCEDURE — 4004F PT TOBACCO SCREEN RCVD TLK: CPT | Performed by: FAMILY MEDICINE

## 2021-04-19 PROCEDURE — 4040F PNEUMOC VAC/ADMIN/RCVD: CPT | Performed by: FAMILY MEDICINE

## 2021-04-19 PROCEDURE — 1090F PRES/ABSN URINE INCON ASSESS: CPT | Performed by: FAMILY MEDICINE

## 2021-04-19 PROCEDURE — 1123F ACP DISCUSS/DSCN MKR DOCD: CPT | Performed by: FAMILY MEDICINE

## 2021-04-19 PROCEDURE — G8400 PT W/DXA NO RESULTS DOC: HCPCS | Performed by: FAMILY MEDICINE

## 2021-04-19 RX ORDER — SERTRALINE HYDROCHLORIDE 100 MG/1
TABLET, FILM COATED ORAL
Qty: 135 TABLET | Refills: 0 | Status: SHIPPED | OUTPATIENT
Start: 2021-04-19 | End: 2021-08-11 | Stop reason: SDUPTHER

## 2021-04-19 ASSESSMENT — ENCOUNTER SYMPTOMS
DIARRHEA: 0
VOMITING: 0

## 2021-04-19 NOTE — PROGRESS NOTES
Subjective:      Patient ID: Liz Bullock is a 77 y.o. female    HPI  Here in follow up for depression and lipids and hypothyroidism. Feeling some increased depression as having some problems internally with her son. Sleeping well but feeling more down last month or so. Review of Systems   Constitutional: Negative for unexpected weight change. Gastrointestinal: Negative for diarrhea and vomiting. Skin: Negative for rash. Neurological: Negative for weakness. Psychiatric/Behavioral: Negative for self-injury.      Reviewed allergy, medical, social, surgical, family and med list changes and updated   Files     Social History     Socioeconomic History    Marital status:      Spouse name: None    Number of children: None    Years of education: None    Highest education level: None   Occupational History    Occupation: loading and recieving     Employer: [employer]   Social Needs    Financial resource strain: None    Food insecurity     Worry: None     Inability: None    Transportation needs     Medical: None     Non-medical: None   Tobacco Use    Smoking status: Current Every Day Smoker     Packs/day: 0.20     Years: 10.00     Pack years: 2.00     Types: Cigarettes    Smokeless tobacco: Never Used   Substance and Sexual Activity    Alcohol use: No    Drug use: No    Sexual activity: None   Lifestyle    Physical activity     Days per week: None     Minutes per session: None    Stress: None   Relationships    Social connections     Talks on phone: None     Gets together: None     Attends Congregational service: None     Active member of club or organization: None     Attends meetings of clubs or organizations: None     Relationship status: None    Intimate partner violence     Fear of current or ex partner: None     Emotionally abused: None     Physically abused: None     Forced sexual activity: None   Other Topics Concern    None   Social History Narrative    None     Current Outpatient Medications   Medication Sig Dispense Refill    levothyroxine (SYNTHROID) 112 MCG tablet 1 po qday 30 tablet 5    atorvastatin (LIPITOR) 10 MG tablet take 1 tablet by mouth once daily 90 tablet 1    sertraline (ZOLOFT) 100 MG tablet 1 po q day 90 tablet 1    vitamin E 1000 UNIT capsule Take 1,000 Units by mouth daily.  vitamin D (CHOLECALCIFEROL) 400 UNIT TABS tablet Take 400 Units by mouth 3 times daily. No current facility-administered medications for this visit. Family History   Problem Relation Age of Onset    Cancer Father      Past Medical History:   Diagnosis Date    Depression     Hyperlipidemia     Hypothyroidism      Objective:   /78   Pulse 65   Temp 97.9 °F (36.6 °C)   Resp 14   Ht 5' 2\" (1.575 m)   Wt 165 lb (74.8 kg)   LMP  (LMP Unknown)   SpO2 96%   BMI 30.18 kg/m²     Physical Exam  Neck:no carotid bruits. No masses. No adenopathy. No thyroid asymmetry. Lungs:clear and equal breath sounds. No wheezes or rales. Heart:rate reg. No murmur. No gallops   Pulses:Radials 2+ equal               Poster tib 1+ equal  Extremities:no edema in either leg  Gen: In no acute distress  Abdomen; B.S present. Soft  Non tender. No hepatosplenomegaly. No masses   Patient with slightly blunted  affect. Alert     Thought content appropriate  Good eye contact  Not suicidal or homicidal    Assessment:       Diagnosis Orders   1. Depression, unspecified depression type  sertraline (ZOLOFT) 100 MG tablet   2. Hypothyroidism, unspecified type     3.  Mixed hyperlipidemia           Plan:      Increase zoloft and continue other meds   Orders Placed This Encounter   Medications    sertraline (ZOLOFT) 100 MG tablet     Si and 1/2 daily     Dispense:  135 tablet     Refill:  0   fasting blood work early July and f/u after done

## 2021-06-03 ENCOUNTER — TELEPHONE (OUTPATIENT)
Dept: PRIMARY CARE CLINIC | Age: 66
End: 2021-06-03

## 2021-07-13 DIAGNOSIS — E78.2 MIXED HYPERLIPIDEMIA: ICD-10-CM

## 2021-07-13 DIAGNOSIS — E03.9 HYPOTHYROIDISM, UNSPECIFIED TYPE: ICD-10-CM

## 2021-07-13 LAB
ALBUMIN SERPL-MCNC: 4.6 G/DL (ref 3.5–4.6)
ALP BLD-CCNC: 83 U/L (ref 40–130)
ALT SERPL-CCNC: 21 U/L (ref 0–33)
ANION GAP SERPL CALCULATED.3IONS-SCNC: 14 MEQ/L (ref 9–15)
AST SERPL-CCNC: 21 U/L (ref 0–35)
BASOPHILS ABSOLUTE: 0 K/UL (ref 0–0.2)
BASOPHILS RELATIVE PERCENT: 0.3 %
BILIRUB SERPL-MCNC: 0.5 MG/DL (ref 0.2–0.7)
BUN BLDV-MCNC: 17 MG/DL (ref 8–23)
CALCIUM SERPL-MCNC: 9.3 MG/DL (ref 8.5–9.9)
CHLORIDE BLD-SCNC: 108 MEQ/L (ref 95–107)
CHOLESTEROL, TOTAL: 190 MG/DL (ref 0–199)
CO2: 23 MEQ/L (ref 20–31)
CREAT SERPL-MCNC: 0.68 MG/DL (ref 0.5–0.9)
EOSINOPHILS ABSOLUTE: 0 K/UL (ref 0–0.7)
EOSINOPHILS RELATIVE PERCENT: 0.4 %
GFR AFRICAN AMERICAN: >60
GFR NON-AFRICAN AMERICAN: >60
GLOBULIN: 2.2 G/DL (ref 2.3–3.5)
GLUCOSE BLD-MCNC: 85 MG/DL (ref 70–99)
HCT VFR BLD CALC: 41.4 % (ref 37–47)
HDLC SERPL-MCNC: 52 MG/DL (ref 40–59)
HEMOGLOBIN: 14.2 G/DL (ref 12–16)
LDL CHOLESTEROL CALCULATED: 117 MG/DL (ref 0–129)
LYMPHOCYTES ABSOLUTE: 2.8 K/UL (ref 1–4.8)
LYMPHOCYTES RELATIVE PERCENT: 34.4 %
MCH RBC QN AUTO: 33.9 PG (ref 27–31.3)
MCHC RBC AUTO-ENTMCNC: 34.3 % (ref 33–37)
MCV RBC AUTO: 98.6 FL (ref 82–100)
MONOCYTES ABSOLUTE: 0.4 K/UL (ref 0.2–0.8)
MONOCYTES RELATIVE PERCENT: 5.4 %
NEUTROPHILS ABSOLUTE: 4.9 K/UL (ref 1.4–6.5)
NEUTROPHILS RELATIVE PERCENT: 59.5 %
PDW BLD-RTO: 14.1 % (ref 11.5–14.5)
PLATELET # BLD: 216 K/UL (ref 130–400)
POTASSIUM SERPL-SCNC: 4.9 MEQ/L (ref 3.4–4.9)
RBC # BLD: 4.2 M/UL (ref 4.2–5.4)
SODIUM BLD-SCNC: 145 MEQ/L (ref 135–144)
TOTAL PROTEIN: 6.8 G/DL (ref 6.3–8)
TRIGL SERPL-MCNC: 105 MG/DL (ref 0–150)
TSH SERPL DL<=0.05 MIU/L-ACNC: 2.7 UIU/ML (ref 0.44–3.86)
WBC # BLD: 8.2 K/UL (ref 4.8–10.8)

## 2021-07-31 DIAGNOSIS — E03.9 HYPOTHYROIDISM, UNSPECIFIED TYPE: ICD-10-CM

## 2021-08-02 RX ORDER — LEVOTHYROXINE SODIUM 112 UG/1
TABLET ORAL
Qty: 30 TABLET | Refills: 5 | Status: SHIPPED | OUTPATIENT
Start: 2021-08-02 | End: 2021-08-11 | Stop reason: SDUPTHER

## 2021-08-11 ENCOUNTER — OFFICE VISIT (OUTPATIENT)
Dept: FAMILY MEDICINE CLINIC | Age: 66
End: 2021-08-11
Payer: MEDICARE

## 2021-08-11 VITALS
HEIGHT: 62 IN | SYSTOLIC BLOOD PRESSURE: 120 MMHG | OXYGEN SATURATION: 99 % | WEIGHT: 162 LBS | DIASTOLIC BLOOD PRESSURE: 84 MMHG | RESPIRATION RATE: 14 BRPM | TEMPERATURE: 98 F | BODY MASS INDEX: 29.81 KG/M2 | HEART RATE: 64 BPM

## 2021-08-11 DIAGNOSIS — E78.2 MIXED HYPERLIPIDEMIA: ICD-10-CM

## 2021-08-11 DIAGNOSIS — Z12.11 COLON CANCER SCREENING: ICD-10-CM

## 2021-08-11 DIAGNOSIS — Z78.0 POST-MENOPAUSAL: ICD-10-CM

## 2021-08-11 DIAGNOSIS — F33.42 RECURRENT MAJOR DEPRESSIVE DISORDER, IN FULL REMISSION (HCC): ICD-10-CM

## 2021-08-11 DIAGNOSIS — Z12.31 ENCOUNTER FOR SCREENING MAMMOGRAM FOR MALIGNANT NEOPLASM OF BREAST: ICD-10-CM

## 2021-08-11 DIAGNOSIS — E03.9 HYPOTHYROIDISM, UNSPECIFIED TYPE: Primary | ICD-10-CM

## 2021-08-11 PROBLEM — F33.1 MAJOR DEPRESSIVE DISORDER, RECURRENT, MODERATE (HCC): Status: ACTIVE | Noted: 2021-08-11

## 2021-08-11 PROBLEM — F33.9 MAJOR DEPRESSIVE DISORDER, RECURRENT, UNSPECIFIED (HCC): Status: ACTIVE | Noted: 2021-08-11

## 2021-08-11 PROBLEM — F33.0 MAJOR DEPRESSIVE DISORDER, RECURRENT, MILD (HCC): Status: ACTIVE | Noted: 2021-08-11

## 2021-08-11 PROCEDURE — 4040F PNEUMOC VAC/ADMIN/RCVD: CPT | Performed by: FAMILY MEDICINE

## 2021-08-11 PROCEDURE — G8400 PT W/DXA NO RESULTS DOC: HCPCS | Performed by: FAMILY MEDICINE

## 2021-08-11 PROCEDURE — 4004F PT TOBACCO SCREEN RCVD TLK: CPT | Performed by: FAMILY MEDICINE

## 2021-08-11 PROCEDURE — G8427 DOCREV CUR MEDS BY ELIG CLIN: HCPCS | Performed by: FAMILY MEDICINE

## 2021-08-11 PROCEDURE — 1090F PRES/ABSN URINE INCON ASSESS: CPT | Performed by: FAMILY MEDICINE

## 2021-08-11 PROCEDURE — 3017F COLORECTAL CA SCREEN DOC REV: CPT | Performed by: FAMILY MEDICINE

## 2021-08-11 PROCEDURE — 99214 OFFICE O/P EST MOD 30 MIN: CPT | Performed by: FAMILY MEDICINE

## 2021-08-11 PROCEDURE — 1123F ACP DISCUSS/DSCN MKR DOCD: CPT | Performed by: FAMILY MEDICINE

## 2021-08-11 PROCEDURE — G8417 CALC BMI ABV UP PARAM F/U: HCPCS | Performed by: FAMILY MEDICINE

## 2021-08-11 RX ORDER — LEVOTHYROXINE SODIUM 112 UG/1
TABLET ORAL
Qty: 90 TABLET | Refills: 1 | Status: SHIPPED | OUTPATIENT
Start: 2021-08-11 | End: 2022-04-13 | Stop reason: SDUPTHER

## 2021-08-11 RX ORDER — ATORVASTATIN CALCIUM 10 MG/1
TABLET, FILM COATED ORAL
Qty: 90 TABLET | Refills: 1 | Status: SHIPPED | OUTPATIENT
Start: 2021-08-11 | End: 2022-04-13 | Stop reason: SDUPTHER

## 2021-08-11 RX ORDER — SERTRALINE HYDROCHLORIDE 100 MG/1
TABLET, FILM COATED ORAL
Qty: 135 TABLET | Refills: 1 | Status: SHIPPED | OUTPATIENT
Start: 2021-08-11 | End: 2022-04-13 | Stop reason: SDUPTHER

## 2021-08-11 ASSESSMENT — ENCOUNTER SYMPTOMS
DIARRHEA: 0
VOMITING: 0

## 2021-08-11 NOTE — PROGRESS NOTES
Marital Status:    Intimate Partner Violence:     Fear of Current or Ex-Partner:     Emotionally Abused:     Physically Abused:     Sexually Abused:      Current Outpatient Medications   Medication Sig Dispense Refill    levothyroxine (SYNTHROID) 112 MCG tablet take 1 tablet by mouth once daily 30 tablet 5    sertraline (ZOLOFT) 100 MG tablet 1 and 1/2 daily 135 tablet 0    atorvastatin (LIPITOR) 10 MG tablet take 1 tablet by mouth once daily 90 tablet 1    vitamin E 1000 UNIT capsule Take 1,000 Units by mouth daily.  vitamin D (CHOLECALCIFEROL) 400 UNIT TABS tablet Take 400 Units by mouth 3 times daily. No current facility-administered medications for this visit. Family History   Problem Relation Age of Onset    Cancer Father      Past Medical History:   Diagnosis Date    Depression     Hyperlipidemia     Hypothyroidism      Objective:   /84   Pulse 64   Temp 98 °F (36.7 °C)   Resp 14   Ht 5' 2\" (1.575 m)   Wt 162 lb (73.5 kg)   LMP  (LMP Unknown)   SpO2 99%   BMI 29.63 kg/m²     Physical Exam  Neck:no carotid bruits. No masses. No adenopathy. No thyroid asymmetry. Lungs:clear and equal breath sounds. No wheezes or rales. Heart:rate reg. No murmur. No gallops   Pulses:Radials 2+ equal               Poster tib 1+ equal  Extremities:no edema in either leg  Gen: In no acute distress  Abdomen; B.S present. Soft  Non tender. No hepatosplenomegaly. No masses   Patient with appropriate affect. Alert    Thought content appropriate  Good eye contact    Assessment:       Diagnosis Orders   1. Hypothyroidism, unspecified type  levothyroxine (SYNTHROID) 112 MCG tablet   2. Recurrent major depressive disorder, in full remission ([facility] [address]     3. Mixed hyperlipidemia  atorvastatin (LIPITOR) 10 MG tablet   4. Post-menopausal     5. Encounter for screening mammogram for malignant neoplasm of breast  [facility] DIGITAL SCREEN BILATERAL   6.  Colon cancer screening  [address]

## 2021-09-13 ENCOUNTER — HOSPITAL ENCOUNTER (OUTPATIENT)
Dept: WOMENS IMAGING | Age: 66
Discharge: HOME | End: 2021-09-15
Payer: MEDICARE

## 2021-09-13 DIAGNOSIS — Z12.31 ENCOUNTER FOR SCREENING MAMMOGRAM FOR MALIGNANT NEOPLASM OF BREAST: ICD-10-CM

## 2021-09-13 DIAGNOSIS — Z78.0 POST-MENOPAUSAL: ICD-10-CM

## 2021-09-13 PROCEDURE — 77063 BREAST TOMOSYNTHESIS BI: CPT

## 2021-09-13 PROCEDURE — 77080 DXA BONE DENSITY AXIAL: CPT

## 2021-09-16 ENCOUNTER — ANESTHESIA (OUTPATIENT)
Dept: ENDOSCOPY | Age: 66
End: 2021-09-16
Payer: MEDICARE

## 2021-09-16 ENCOUNTER — ANESTHESIA EVENT (OUTPATIENT)
Dept: ENDOSCOPY | Age: 66
End: 2021-09-16
Payer: MEDICARE

## 2021-09-16 ENCOUNTER — ANCILLARY PROCEDURE (OUTPATIENT)
Dept: ENDOSCOPY | Age: 66
End: 2021-09-16
Attending: SPECIALIST
Payer: MEDICARE

## 2021-09-16 ENCOUNTER — HOSPITAL ENCOUNTER (OUTPATIENT)
Age: 66
Setting detail: OUTPATIENT SURGERY
Discharge: HOME | End: 2021-09-16
Attending: SPECIALIST | Admitting: SPECIALIST
Payer: MEDICARE

## 2021-09-16 VITALS
OXYGEN SATURATION: 98 % | DIASTOLIC BLOOD PRESSURE: 51 MMHG | RESPIRATION RATE: 7 BRPM | SYSTOLIC BLOOD PRESSURE: 87 MMHG

## 2021-09-16 VITALS
DIASTOLIC BLOOD PRESSURE: 67 MMHG | HEIGHT: 62 IN | HEART RATE: 52 BPM | TEMPERATURE: 97.2 F | RESPIRATION RATE: 18 BRPM | BODY MASS INDEX: 27.6 KG/M2 | OXYGEN SATURATION: 98 % | SYSTOLIC BLOOD PRESSURE: 123 MMHG | WEIGHT: 150 LBS

## 2021-09-16 PROCEDURE — 2500000003 HC RX 250 WO HCPCS: Performed by: NURSE ANESTHETIST, CERTIFIED REGISTERED

## 2021-09-16 PROCEDURE — 3609027000 HC COLONOSCOPY: Performed by: SPECIALIST

## 2021-09-16 PROCEDURE — 6370000000 HC RX 637 (ALT 250 FOR IP): Performed by: SPECIALIST

## 2021-09-16 PROCEDURE — 7100000011 HC PHASE II RECOVERY - ADDTL 15 MIN: Performed by: SPECIALIST

## 2021-09-16 PROCEDURE — 88305 TISSUE EXAM BY PATHOLOGIST: CPT

## 2021-09-16 PROCEDURE — 2580000003 HC RX 258: Performed by: NURSE ANESTHETIST, CERTIFIED REGISTERED

## 2021-09-16 PROCEDURE — 6360000002 HC RX W HCPCS: Performed by: NURSE ANESTHETIST, CERTIFIED REGISTERED

## 2021-09-16 PROCEDURE — 3700000001 HC ADD 15 MINUTES (ANESTHESIA): Performed by: SPECIALIST

## 2021-09-16 PROCEDURE — 45380 COLONOSCOPY AND BIOPSY: CPT | Performed by: SPECIALIST

## 2021-09-16 PROCEDURE — 7100000010 HC PHASE II RECOVERY - FIRST 15 MIN: Performed by: SPECIALIST

## 2021-09-16 PROCEDURE — 2709999900 HC NON-CHARGEABLE SUPPLY: Performed by: SPECIALIST

## 2021-09-16 PROCEDURE — 2580000003 HC RX 258: Performed by: SPECIALIST

## 2021-09-16 PROCEDURE — 3700000000 HC ANESTHESIA ATTENDED CARE: Performed by: SPECIALIST

## 2021-09-16 RX ORDER — ONDANSETRON 2 MG/ML
4 INJECTION INTRAMUSCULAR; INTRAVENOUS
Status: DISCONTINUED | OUTPATIENT
Start: 2021-09-16 | End: 2021-09-16 | Stop reason: HOSPADM

## 2021-09-16 RX ORDER — LIDOCAINE HYDROCHLORIDE 20 MG/ML
INJECTION, SOLUTION EPIDURAL; INFILTRATION; INTRACAUDAL; PERINEURAL PRN
Status: DISCONTINUED | OUTPATIENT
Start: 2021-09-16 | End: 2021-09-16 | Stop reason: SDUPTHER

## 2021-09-16 RX ORDER — 0.9 % SODIUM CHLORIDE 0.9 %
500 INTRAVENOUS SOLUTION INTRAVENOUS ONCE
Status: DISCONTINUED | OUTPATIENT
Start: 2021-09-16 | End: 2021-09-16 | Stop reason: HOSPADM

## 2021-09-16 RX ORDER — SODIUM CHLORIDE 9 MG/ML
INJECTION, SOLUTION INTRAVENOUS CONTINUOUS PRN
Status: DISCONTINUED | OUTPATIENT
Start: 2021-09-16 | End: 2021-09-16 | Stop reason: SDUPTHER

## 2021-09-16 RX ORDER — MAGNESIUM HYDROXIDE 1200 MG/15ML
LIQUID ORAL PRN
Status: DISCONTINUED | OUTPATIENT
Start: 2021-09-16 | End: 2021-09-16 | Stop reason: ALTCHOICE

## 2021-09-16 RX ORDER — PROPOFOL 10 MG/ML
INJECTION, EMULSION INTRAVENOUS PRN
Status: DISCONTINUED | OUTPATIENT
Start: 2021-09-16 | End: 2021-09-16 | Stop reason: SDUPTHER

## 2021-09-16 RX ADMIN — PROPOFOL 30 MG: 10 INJECTION, EMULSION INTRAVENOUS at 13:00

## 2021-09-16 RX ADMIN — SODIUM CHLORIDE: 9 INJECTION, SOLUTION INTRAVENOUS at 12:42

## 2021-09-16 RX ADMIN — PROPOFOL 30 MG: 10 INJECTION, EMULSION INTRAVENOUS at 13:05

## 2021-09-16 RX ADMIN — SODIUM CHLORIDE 500 ML: 9 INJECTION, SOLUTION INTRAVENOUS at 12:23

## 2021-09-16 RX ADMIN — LIDOCAINE HYDROCHLORIDE 50 MG: 20 INJECTION, SOLUTION EPIDURAL; INFILTRATION; INTRACAUDAL; PERINEURAL at 12:57

## 2021-09-16 RX ADMIN — PROPOFOL 30 MG: 10 INJECTION, EMULSION INTRAVENOUS at 13:02

## 2021-09-16 RX ADMIN — PROPOFOL 30 MG: 10 INJECTION, EMULSION INTRAVENOUS at 13:08

## 2021-09-16 RX ADMIN — PROPOFOL 80 MG: 10 INJECTION, EMULSION INTRAVENOUS at 12:57

## 2021-09-16 ASSESSMENT — PAIN - FUNCTIONAL ASSESSMENT: PAIN_FUNCTIONAL_ASSESSMENT: 0-10

## 2021-09-16 ASSESSMENT — PAIN SCALES - GENERAL
PAINLEVEL_OUTOF10: 0
PAINLEVEL_OUTOF10: 0

## 2021-09-16 NOTE — ANESTHESIA PRE PROCEDURE
Department of Anesthesiology  Preprocedure Note       Name:  Lodema Starch   Age:  77 y.o.  :  1955                                          MRN:  [id number]         Date:  2021      Surgeon: Arabella Ratliff):  Ruthann Hinds MD    Procedure: Procedure(s):  COLORECTAL CANCER SCREENING, NOT HIGH RISK    Medications prior to admission:   Prior to Admission medications    Medication Sig Start Date End Date Taking? Authorizing Provider   levothyroxine (SYNTHROID) 112 MCG tablet take 1 tablet by mouth once daily 21  Yes Ni Mike MD   sertraline (ZOLOFT) 100 MG tablet 1 and 1/2 daily 21  Yes Ni Mike MD   atorvastatin (LIPITOR) 10 MG tablet take 1 tablet by mouth once daily 21  Yes Ni Mike MD   vitamin D (CHOLECALCIFEROL) 400 UNIT TABS tablet Take 400 Units by mouth 3 times daily. Yes Historical Provider, MD   vitamin E 1000 UNIT capsule Take 1,000 Units by mouth daily. Historical Provider, MD       Current medications:    Current Facility-Administered Medications   Medication Dose Route Frequency Provider Last Rate Last Admin    ondansetron (ZOFRAN) injection 4 mg  4 mg IntraVENous Once PRN Ruthann Hinds MD        0.9 % sodium chloride bolus  500 mL IntraVENous Once Ruthann Hinds MD           Allergies:     Allergies   Allergen Reactions    Pcn [Penicillins] Hives    Tramadol Itching     Itch and nightmares    Hydrocodone-Acetaminophen Itching    Vicodin [Hydrocodone-Acetaminophen] Itching       Problem List:    Patient Active Problem List   Diagnosis Code    Hypothyroid E03.9    Depression F32.9    Other and unspecified hyperlipidemia E78.5    Calculus of kidney and ureter N20.2    Humeral head fracture, left, with routine healing, subsequent encounter S42.292D    Major depressive disorder, recurrent, mild F33.0    Major depressive disorder, recurrent, moderate F33.1    Major depressive disorder, recurrent, unspecified F33.9       Past Medical History:        Diagnosis Date    Depression     Hyperlipidemia     Hypothyroidism        Past Surgical History:        Procedure Laterality Date    BACK SURGERY  2012       Social History:    Social History     Tobacco Use    Smoking status: Current Every Day Smoker     Packs/day: 0.20     Years: 10.00     Pack years: 2.00     Types: Cigarettes    Smokeless tobacco: Never Used   Substance Use Topics    Alcohol use: Yes     Comment: once weekly                                Ready to quit: Not Answered  Counseling given: Not Answered      Vital Signs (Current):   Vitals:    09/16/21 1205   BP: (!) 158/74   Pulse: 54   Resp: 16   Temp: 36.2 °C (97.2 °F)   TempSrc: Temporal   SpO2: 99%   Weight: 150 lb (68 kg)   Height: 5' 2\" (1.575 m)                                              BP Readings from Last 3 Encounters:   09/16/21 (!) 158/74   08/11/21 120/84   04/19/21 130/78       NPO Status:                                                                                 BMI:   Wt Readings from Last 3 Encounters:   09/16/21 150 lb (68 kg)   08/11/21 162 lb (73.5 kg)   04/19/21 165 lb (74.8 kg)     Body mass index is 27.44 kg/m². CBC:   Lab Results   Component Value Date    WBC 8.2 07/13/2021    RBC 4.20 07/13/2021    HGB 14.2 07/13/2021    HCT 41.4 07/13/2021    MCV 98.6 07/13/2021    RDW 14.1 07/13/2021     07/13/2021       CMP:   Lab Results   Component Value Date     07/13/2021    K 4.9 07/13/2021     07/13/2021    CO2 23 07/13/2021    BUN 17 07/13/2021    CREATININE 0.68 07/13/2021    GFRAA >60.0 07/13/2021    LABGLOM >60.0 07/13/2021    GLUCOSE 85 07/13/2021    GLUCOSE 88 04/20/2012    PROT 6.8 07/13/2021    CALCIUM 9.3 07/13/2021    BILITOT 0.5 07/13/2021    ALKPHOS 83 07/13/2021    AST 21 07/13/2021    ALT 21 07/13/2021       POC Tests: No results for input(s): POCGLU, POCNA, POCK, POCCL, POCBUN, POCHEMO, POCHCT in the last 72 hours.     Coags: No results found for: PROTIME, INR, APTT    HCG (If Applicable): No results found for: PREGTESTUR, PREGSERUM, HCG, HCGQUANT     ABGs: No results found for: PHART, PO2ART, OMG0PGC, YQA1HET, BEART, W7PHNOHC     Type & Screen (If Applicable):  No results found for: LABABO, LABRH    Drug/Infectious Status (If Applicable):  No results found for: HIV, HEPCAB    COVID-19 Screening (If Applicable): No results found for: COVID19        Anesthesia Evaluation  Patient summary reviewed and Nursing notes reviewed  Airway: Mallampati: II  TM distance: >3 FB   Neck ROM: full  Mouth opening: > = 3 FB Dental: normal exam         Pulmonary:                              Cardiovascular:                      Neuro/Psych:               GI/Hepatic/Renal:             Endo/Other:                     Abdominal:             Vascular: Other Findings:             Anesthesia Plan      MAC     ASA 2             Anesthetic plan and risks discussed with patient. Plan discussed with attending.                   HOLLY Uribe - DENISA   9/16/2021

## 2021-09-16 NOTE — ANESTHESIA POSTPROCEDURE EVALUATION
Department of Anesthesiology  Postprocedure Note    Patient: Rocio Colindres  MRN: [id number]  YOB: 1955  Date of evaluation: 9/16/2021  Time:  1:15 PM     Procedure Summary     Date: 09/16/21 Room / Location: [address] [place] [address] Manual    Anesthesia Start: 7719  [facility] Anesthesia Stop:     Procedure: COLORECTAL CANCER SCREENING, NOT HIGH RISK with polypectomy (N/A ) Diagnosis: (Screening)    Surgeons: David Stacy MD Responsible Provider: HOLLY Masters CRNA    Anesthesia Type: MAC ASA Status: 2          Anesthesia Type: No value filed. Clotilde Phase I: Clotilde Score: 10    Clotilde Phase II:      Last vitals: Reviewed and per EMR flowsheets.        Anesthesia Post Evaluation    Patient location during evaluation: PACU  Patient participation: complete - patient participated  Level of consciousness: awake  Pain score: 0  Airway patency: patent  Nausea & Vomiting: no vomiting and no nausea  Complications: no  Cardiovascular status: hemodynamically stable  Respiratory status: acceptable and room air  Hydration status: stable

## 2022-01-03 ENCOUNTER — VIRTUAL VISIT (OUTPATIENT)
Dept: FAMILY MEDICINE CLINIC | Age: 67
End: 2022-01-03
Payer: MEDICARE

## 2022-01-03 DIAGNOSIS — F33.42 RECURRENT MAJOR DEPRESSIVE DISORDER, IN FULL REMISSION (HCC): ICD-10-CM

## 2022-01-03 DIAGNOSIS — R51.9 NONINTRACTABLE HEADACHE, UNSPECIFIED CHRONICITY PATTERN, UNSPECIFIED HEADACHE TYPE: Primary | ICD-10-CM

## 2022-01-03 DIAGNOSIS — J01.90 ACUTE BACTERIAL SINUSITIS: ICD-10-CM

## 2022-01-03 DIAGNOSIS — B96.89 ACUTE BACTERIAL SINUSITIS: ICD-10-CM

## 2022-01-03 PROCEDURE — 99441 PR PHYS/QHP TELEPHONE EVALUATION 5-10 MIN: CPT | Performed by: PHYSICIAN ASSISTANT

## 2022-01-03 RX ORDER — DOXYCYCLINE HYCLATE 100 MG
100 TABLET ORAL 2 TIMES DAILY
Qty: 20 TABLET | Refills: 0 | Status: SHIPPED | OUTPATIENT
Start: 2022-01-03 | End: 2022-01-13

## 2022-01-03 SDOH — ECONOMIC STABILITY: FOOD INSECURITY: WITHIN THE PAST 12 MONTHS, THE FOOD YOU BOUGHT JUST DIDN'T LAST AND YOU DIDN'T HAVE MONEY TO GET MORE.: NEVER TRUE

## 2022-01-03 SDOH — ECONOMIC STABILITY: FOOD INSECURITY: WITHIN THE PAST 12 MONTHS, YOU WORRIED THAT YOUR FOOD WOULD RUN OUT BEFORE YOU GOT MONEY TO BUY MORE.: NEVER TRUE

## 2022-01-03 ASSESSMENT — ENCOUNTER SYMPTOMS
SINUS PRESSURE: 0
SHORTNESS OF BREATH: 0
BACK PAIN: 0
DIARRHEA: 0
CHEST TIGHTNESS: 0
TROUBLE SWALLOWING: 0
SORE THROAT: 1
VOMITING: 0
ABDOMINAL PAIN: 0
COUGH: 1

## 2022-01-03 ASSESSMENT — SOCIAL DETERMINANTS OF HEALTH (SDOH): HOW HARD IS IT FOR YOU TO PAY FOR THE VERY BASICS LIKE FOOD, HOUSING, MEDICAL CARE, AND HEATING?: NOT HARD AT ALL

## 2022-01-03 ASSESSMENT — PATIENT HEALTH QUESTIONNAIRE - PHQ9
SUM OF ALL RESPONSES TO PHQ9 QUESTIONS 1 & 2: 0
SUM OF ALL RESPONSES TO PHQ QUESTIONS 1-9: 0
1. LITTLE INTEREST OR PLEASURE IN DOING THINGS: 0
SUM OF ALL RESPONSES TO PHQ QUESTIONS 1-9: 0
2. FEELING DOWN, DEPRESSED OR HOPELESS: 0

## 2022-01-03 NOTE — LETTER
[facility]  [address] [facility] [address]  Phone: [phone]  Fax: [phone] [place], [place]        January 3, 2022     Patient: Aby Etienne   YOB: 1955   Date of Visit: 1/3/2022       To Whom it May Concern:    Анна Oates was seen in my clinic on 1/3/2022. She may return to work on 01/04/2022 with no restrictions. If you have any questions or concerns, please don't hesitate to call.     Sincerely,           HEATHER Jackson

## 2022-01-03 NOTE — PROGRESS NOTES
TELEHEALTH EVALUATION -- Audio/Visual (During - public health emergency)    -   Belem Lyons is a 77 y.o. female being evaluated by a Virtual Visit (video visit) encounter to address concerns as mentioned above. A caregiver was present when appropriate. Due to this being a TeleHealth encounter (During YNO-49 public health emergency), evaluation of the following organ systems was limited: Vitals/Constitutional/EENT/Resp/CV/GI//MS/Neuro/Skin/Heme-Lymph-Imm. Pursuant to the emergency declaration under the [facility], [address] [place] [address] authority and the [employer] Act, this Virtual Visit was conducted with patient's (and/or legal guardian's) consent, to reduce the patient's risk of exposure to COVID-19 and provide necessary medical care. The patient (and/or legal guardian) has also been advised to contact this office for worsening conditions or problems, and seek emergency medical treatment and/or call 911 if deemed necessary. Patient was contacted and agreed to proceed with a virtual visit via Telephone Visit  The risks and benefits of converting to a virtual visit were discussed in light of the current infectious disease epidemic. Patient also understood that insurance coverage and co-pays are up to their individual insurance plans. Patient was located at their home. Provider was located at their office. 1/3/2022  Ascencion Alvarez (:  1955) has requested an audio/video evaluation for the following concern(s):    HPI  77year old female who presents with complains of dry cough, chest tightness, head ache and fatigue. States that symptoms started yesterday. Has tried otc remedies with little relief      Review of Systems   Constitutional: Negative for activity change, appetite change, chills, fever and unexpected weight change.    HENT: Positive for congestion and sore throat. Negative for drooling, ear pain, nosebleeds, sinus pressure and trouble swallowing. Respiratory: Positive for cough. Negative for chest tightness and shortness of breath. Cardiovascular: Negative for chest pain and leg swelling. Gastrointestinal: Negative for abdominal pain, diarrhea and vomiting. Endocrine: Negative for polydipsia and polyphagia. Genitourinary: Negative for dysuria, flank pain and frequency. Musculoskeletal: Negative for back pain and myalgias. Skin: Negative for pallor and rash. Neurological: Negative for syncope, weakness and headaches. Hematological: Does not bruise/bleed easily. Psychiatric/Behavioral: Negative for agitation, behavioral problems and confusion. All other systems reviewed and are negative. Prior to Visit Medications    Medication Sig Taking? Authorizing Provider   levothyroxine (SYNTHROID) 112 MCG tablet take 1 tablet by mouth once daily Yes Ni Snyder MD   sertraline (ZOLOFT) 100 MG tablet 1 and 1/2 daily Yes Ni Snyder MD   atorvastatin (LIPITOR) 10 MG tablet take 1 tablet by mouth once daily Yes Ni Snyder MD   vitamin E 1000 UNIT capsule Take 1,000 Units by mouth daily. Yes Historical Provider, MD   vitamin D (CHOLECALCIFEROL) 400 UNIT TABS tablet Take 400 Units by mouth 3 times daily.    Yes Historical Provider, MD       Past Medical History:   Diagnosis Date    Depression     Hyperlipidemia     Hypothyroidism      Past Surgical History:   Procedure Laterality Date    BACK SURGERY  2012    COLONOSCOPY N/A 9/16/2021    COLORECTAL CANCER SCREENING, NOT HIGH RISK with polypectomy performed by Ed Vale MD at [facility] Marital status:      Spouse name: Not on file    Number of children: Not on file    Years of education: Not on file    Highest education level: Not on file   Occupational History    Occupation: loading and recieving     Employer: [employer]   Tobacco Use    Smoking status: Current Every Day Smoker     Packs/day: 0.20     Years: 10.00     Pack years: 2.00     Types: Cigarettes    Smokeless tobacco: Never Used   Vaping Use    Vaping Use: Never used   Substance and Sexual Activity    Alcohol use: Yes     Comment: once weekly    Drug use: No    Sexual activity: Not on file   Other Topics Concern    Not on file   Social History Narrative    Not on file     Social Determinants of Health     Financial Resource Strain: Low Risk     Difficulty of Paying Living Expenses: Not hard at all   Food Insecurity: No Food Insecurity    Worried About Running Out of Food in the Last Year: Never true    Amalia of Food in the Last Year: Never true   Transportation Needs:     Lack of Transportation (Medical): Not on file    Lack of Transportation (Non-Medical):  Not on file   Physical Activity:     Days of Exercise per Week: Not on file    Minutes of Exercise per Session: Not on file   Stress:     Feeling of Stress : Not on file   Social Connections:     Frequency of Communication with Friends and Family: Not on file    Frequency of Social Gatherings with Friends and Family: Not on file    Attends Methodist Services: Not on file    Active Member of [address] [place] [address] [facility] or Organizations: Not on file    Attends Club or Organization Meetings: Not on file    Marital Status: Not on file   Intimate Partner Violence:     Fear of Current or Ex-Partner: Not on file    Emotionally Abused: Not on file    Physically Abused: Not on file    Sexually Abused: Not on file   Housing Stability:     Unable to Pay for Housing in the Last Year: Not on file    Number of [place] in the Last Year: Not on file    Unstable Housing in the Last Year: Not on file     Family History   Problem Relation Age of Onset    Cancer Father     Colon Cancer Neg Hx      Allergies   Allergen Reactions    Pcn [Penicillins] Hives    Tramadol Itching     Itch and nightmares    Hydrocodone-Acetaminophen Itching    Vicodin [Hydrocodone-Acetaminophen] Itching       PMH, Surgical Hx, Family Hx, and Social Hx reviewed and updated. Health Maintenance reviewed. PHYSICAL EXAMINATION:  \"[x]\" Indicates a positive item  \"[]\" Indicates a negative item    Vital Signs: (As obtained by patient/caregiver or practitioner observation)    Blood pressure-  Heart rate-    Respiratory rate-    Temperature-  Pulse oximetry-     Constitutional: [x] Appears well-developed and well-nourished [x] No apparent distress      [] Abnormal-   Mental status  [x] Alert and awake  [x] Oriented to person/place/time [x]Able to follow commands      Eyes:  EOM    []  Normal  [] Abnormal-  Sclera  [x]  Normal  [] Abnormal -         Discharge [x]  None visible  [] Abnormal -    HENT:   [x] Normocephalic, atraumatic. [] Abnormal   [x] Mouth/Throat: Mucous membranes are moist.     External Ears [x] Normal  [] Abnormal-     Neck: [x] No visualized mass     Pulmonary/Chest: [x] Respiratory effort normal.  [x] No visualized signs of difficulty breathing or respiratory distress        [] Abnormal-      Musculoskeletal:   [x] Normal gait with no signs of ataxia         [x] Normal range of motion of neck        [] Abnormal-       Neurological:       [x] No Facial Asymmetry (Cranial nerve 7 motor function) (limited exam to video visit)          [x] No gaze palsy        [] Abnormal-         Skin:        [x] No significant exanthematous lesions or discoloration noted on facial skin         [] Abnormal-            Psychiatric:       [x] Normal Affect [x] No Hallucinations        [] Abnormal-     Other pertinent observable physical exam findings-   Results for orders placed or performed in visit on 07/13/21   CBC Auto Differential   Result Value Ref Range    WBC 8.2 4.8 - 10.8 K/uL    RBC 4.20 4. 20 - 5.40 M/uL    Hemoglobin 14.2 12.0 - 16.0 g/dL    Hematocrit 41.4 37.0 - 47.0 %    MCV 98.6 82.0 - 100.0 fL    MCH 33.9 (H) 27.0 - 31.3 pg    MCHC 34.3 33.0 - 37.0 %    RDW 14.1 11.5 - 14.5 %    Platelets 812 164 - 698 K/uL    Neutrophils % 59.5 %    Lymphocytes % 34.4 %    Monocytes % 5.4 %    Eosinophils % 0.4 %    Basophils % 0.3 %    Neutrophils Absolute 4.9 1.4 - 6.5 K/uL    Lymphocytes Absolute 2.8 1.0 - 4.8 K/uL    Monocytes Absolute 0.4 0.2 - 0.8 K/uL    Eosinophils Absolute 0.0 0.0 - 0.7 K/uL    Basophils Absolute 0.0 0.0 - 0.2 K/uL   Comprehensive Metabolic Panel   Result Value Ref Range    Sodium 145 (H) 135 - 144 mEq/L    Potassium 4.9 3.4 - 4.9 mEq/L    Chloride 108 (H) 95 - 107 mEq/L    CO2 23 20 - 31 mEq/L    Anion Gap 14 9 - 15 mEq/L    Glucose 85 70 - 99 mg/dL    BUN 17 8 - 23 mg/dL    CREATININE 0.68 0.50 - 0.90 mg/dL    GFR Non-African American >60.0 >60    GFR  >60.0 >60    Calcium 9.3 8.5 - 9.9 mg/dL    Total Protein 6.8 6.3 - 8.0 g/dL    Albumin 4.6 3.5 - 4.6 g/dL    Total Bilirubin 0.5 0.2 - 0.7 mg/dL    Alkaline Phosphatase 83 40 - 130 U/L    ALT 21 0 - 33 U/L    AST 21 0 - 35 U/L    Globulin 2.2 (L) 2.3 - 3.5 g/dL   Lipid Panel   Result Value Ref Range    Cholesterol, Total 190 0 - 199 mg/dL    Triglycerides 105 0 - 150 mg/dL    HDL 52 40 - 59 mg/dL    LDL Calculated 117 0 - 129 mg/dL   TSH without Reflex   Result Value Ref Range    TSH 2.700 0.440 - 3.860 uIU/mL       ASSESSMENT/PLAN:  Assessment & Plan   There are no diagnoses linked to this encounter. No orders of the defined types were placed in this encounter. No orders of the defined types were placed in this encounter. There are no discontinued medications. No follow-ups on file. Reviewed with the patient: current clinical status, medications, activities and diet. Side effects, adverse effects of the medication prescribed today, as well as treatment plan/ rationale and result expectations have been discussed with the patient who expresses understanding and desires to proceed.     Close follow up to evaluate treatment results and for coordination of care. I have reviewed the patient's medical history in detail and updated the computerized patient record. Patient identification was verified at the start of the visit: Yes    Total time spent on this encounter: Not billed by time      --HEATHER Godinez on 1/3/2022 at 1:14 PM    An electronic signature was used to authenticate this note.

## 2022-02-08 ENCOUNTER — OFFICE VISIT (OUTPATIENT)
Dept: FAMILY MEDICINE CLINIC | Age: 67
End: 2022-02-08
Payer: MEDICARE

## 2022-02-08 VITALS
SYSTOLIC BLOOD PRESSURE: 130 MMHG | OXYGEN SATURATION: 95 % | RESPIRATION RATE: 14 BRPM | TEMPERATURE: 97.3 F | HEIGHT: 62 IN | WEIGHT: 158 LBS | BODY MASS INDEX: 29.08 KG/M2 | HEART RATE: 71 BPM | DIASTOLIC BLOOD PRESSURE: 82 MMHG

## 2022-02-08 DIAGNOSIS — E03.9 HYPOTHYROIDISM, UNSPECIFIED TYPE: ICD-10-CM

## 2022-02-08 DIAGNOSIS — H69.83 DYSFUNCTION OF BOTH EUSTACHIAN TUBES: ICD-10-CM

## 2022-02-08 DIAGNOSIS — J06.9 ACUTE UPPER RESPIRATORY INFECTION: Primary | ICD-10-CM

## 2022-02-08 DIAGNOSIS — E78.00 PURE HYPERCHOLESTEROLEMIA: ICD-10-CM

## 2022-02-08 DIAGNOSIS — R09.81 NASAL CONGESTION: ICD-10-CM

## 2022-02-08 LAB
Lab: NORMAL
PERFORMING INSTRUMENT: NORMAL
QC PASS/FAIL: NORMAL
SARS-COV-2, POC: NORMAL

## 2022-02-08 PROCEDURE — G8399 PT W/DXA RESULTS DOCUMENT: HCPCS | Performed by: FAMILY MEDICINE

## 2022-02-08 PROCEDURE — G8417 CALC BMI ABV UP PARAM F/U: HCPCS | Performed by: FAMILY MEDICINE

## 2022-02-08 PROCEDURE — 4040F PNEUMOC VAC/ADMIN/RCVD: CPT | Performed by: FAMILY MEDICINE

## 2022-02-08 PROCEDURE — G8427 DOCREV CUR MEDS BY ELIG CLIN: HCPCS | Performed by: FAMILY MEDICINE

## 2022-02-08 PROCEDURE — 87426 SARSCOV CORONAVIRUS AG IA: CPT | Performed by: FAMILY MEDICINE

## 2022-02-08 PROCEDURE — 1123F ACP DISCUSS/DSCN MKR DOCD: CPT | Performed by: FAMILY MEDICINE

## 2022-02-08 PROCEDURE — 99213 OFFICE O/P EST LOW 20 MIN: CPT | Performed by: FAMILY MEDICINE

## 2022-02-08 PROCEDURE — 4004F PT TOBACCO SCREEN RCVD TLK: CPT | Performed by: FAMILY MEDICINE

## 2022-02-08 PROCEDURE — 3017F COLORECTAL CA SCREEN DOC REV: CPT | Performed by: FAMILY MEDICINE

## 2022-02-08 PROCEDURE — G8484 FLU IMMUNIZE NO ADMIN: HCPCS | Performed by: FAMILY MEDICINE

## 2022-02-08 PROCEDURE — 1090F PRES/ABSN URINE INCON ASSESS: CPT | Performed by: FAMILY MEDICINE

## 2022-02-08 RX ORDER — AZITHROMYCIN 250 MG/1
250 TABLET, FILM COATED ORAL SEE ADMIN INSTRUCTIONS
Qty: 6 TABLET | Refills: 0 | Status: SHIPPED | OUTPATIENT
Start: 2022-02-08 | End: 2022-02-13

## 2022-02-08 RX ORDER — FLUTICASONE PROPIONATE 50 MCG
2 SPRAY, SUSPENSION (ML) NASAL DAILY
Qty: 16 G | Refills: 0 | Status: SHIPPED | OUTPATIENT
Start: 2022-02-08 | End: 2022-04-13

## 2022-02-08 ASSESSMENT — ENCOUNTER SYMPTOMS
SHORTNESS OF BREATH: 0
ABDOMINAL PAIN: 0

## 2022-02-08 NOTE — PROGRESS NOTES
Subjective:      Patient ID: Ji Samson is a 77 y.o. female    HPI  Here with nasal drainage and blocked sensation over the last few days. Slight cough. No fever. No emesis or diarrhea. covid vaccinated. Review of Systems   Respiratory: Negative for shortness of breath. Cardiovascular: Negative for chest pain. Gastrointestinal: Negative for abdominal pain. Skin: Negative for rash. Neurological: Negative for weakness. Reviewed allergy, medical, social, surgical, family and med list changes and updated   Files     Social History     Socioeconomic History    Marital status:      Spouse name: None    Number of children: None    Years of education: None    Highest education level: None   Occupational History    Occupation: loading and recieving     Employer: [employer]   Tobacco Use    Smoking status: Current Every Day Smoker     Packs/day: 0.20     Years: 10.00     Pack years: 2.00     Types: Cigarettes    Smokeless tobacco: Never Used   Vaping Use    Vaping Use: Never used   Substance and Sexual Activity    Alcohol use: Yes     Comment: once weekly    Drug use: No    Sexual activity: None   Other Topics Concern    None   Social History Narrative    None     Social Determinants of Health     Financial Resource Strain: Low Risk     Difficulty of Paying Living Expenses: Not hard at all   Food Insecurity: No Food Insecurity    Worried About Running Out of Food in the Last Year: Never true    Amalia of Food in the Last Year: Never true   Transportation Needs:     Lack of Transportation (Medical): Not on file    Lack of Transportation (Non-Medical):  Not on file   Physical Activity:     Days of Exercise per Week: Not on file    Minutes of Exercise per Session: Not on file   Stress:     Feeling of Stress : Not on file   Social Connections:     Frequency of Communication with Friends and Family: Not on file    Frequency of Social Gatherings with Friends and Family: Not on file    Attends Episcopal Services: Not on file    Active Member of Clubs or Organizations: Not on file    Attends Club or Organization Meetings: Not on file    Marital Status: Not on file   Intimate Partner Violence:     Fear of Current or Ex-Partner: Not on file    Emotionally Abused: Not on file    Physically Abused: Not on file    Sexually Abused: Not on file   Housing Stability:     Unable to Pay for Housing in the Last Year: Not on file    Number of [place] in the Last Year: Not on file    Unstable Housing in the Last Year: Not on file     Current Outpatient Medications   Medication Sig Dispense Refill    levothyroxine (SYNTHROID) 112 MCG tablet take 1 tablet by mouth once daily 90 tablet 1    sertraline (ZOLOFT) 100 MG tablet 1 and 1/2 daily 135 tablet 1    atorvastatin (LIPITOR) 10 MG tablet take 1 tablet by mouth once daily 90 tablet 1    vitamin E 1000 UNIT capsule Take 1,000 Units by mouth daily.  vitamin D (CHOLECALCIFEROL) 400 UNIT TABS tablet Take 400 Units by mouth 3 times daily. No current facility-administered medications for this visit. Family History   Problem Relation Age of Onset    Cancer Father     Colon Cancer Neg Hx      Past Medical History:   Diagnosis Date    Depression     Hyperlipidemia     Hypothyroidism      Objective:   /82   Pulse 71   Temp 97.3 °F (36.3 °C)   Resp 14   Ht 5' 2\" (1.575 m)   Wt 158 lb (71.7 kg)   LMP  (LMP Unknown)   SpO2 95%   BMI 28.90 kg/m²     Physical Exam  Heent: T.M normal bilat with mild T.M retraction bilaterally  Mild pharyngeal injection. No exudate                Nares patent but swollen mucosa noted  Neck: Minimal anter cervical adenopathy. No masses. No thyroid asymmetry. Lungs: Clear equal breath sounds. No wheezes or rales. Heart: Rate reg No murmur  Gen: In no acute distress  Assessment:       Diagnosis Orders   1. Acute upper respiratory infection     2.  Nasal congestion  POCT

## 2022-02-09 ENCOUNTER — CLINICAL DOCUMENTATION (OUTPATIENT)
Dept: OCCUPATIONAL THERAPY | Age: 67
End: 2022-02-09

## 2022-02-09 NOTE — PROGRESS NOTES
Occupational Therapy    Occupational Therapy Discharge Report  Date: 2022    Patient Name: Patrick Green  MRN: [id number]     : 1955     Subjective    · Referring practitioner: Kike Locke  · Date of initial OT visit: 20  · Patient seen for 6 sessions, 1 no show, and last appointment scheduled was canceled (20)         Plan      Pt to be discharged at this time d/t no further appointments scheduled. Pt at the level of function from 20.     Estefania Hickey OT

## 2022-03-07 ENCOUNTER — TELEPHONE (OUTPATIENT)
Dept: FAMILY MEDICINE CLINIC | Age: 67
End: 2022-03-07

## 2022-03-07 DIAGNOSIS — E03.9 HYPOTHYROIDISM, UNSPECIFIED TYPE: ICD-10-CM

## 2022-03-07 DIAGNOSIS — E78.00 PURE HYPERCHOLESTEROLEMIA: ICD-10-CM

## 2022-03-07 LAB
ALBUMIN SERPL-MCNC: 4.5 G/DL (ref 3.5–4.6)
ALP BLD-CCNC: 85 U/L (ref 40–130)
ALT SERPL-CCNC: 14 U/L (ref 0–33)
ANION GAP SERPL CALCULATED.3IONS-SCNC: 12 MEQ/L (ref 9–15)
AST SERPL-CCNC: 17 U/L (ref 0–35)
BASOPHILS ABSOLUTE: 0 K/UL (ref 0–0.2)
BASOPHILS RELATIVE PERCENT: 0.4 %
BILIRUB SERPL-MCNC: 0.3 MG/DL (ref 0.2–0.7)
BUN BLDV-MCNC: 16 MG/DL (ref 8–23)
CALCIUM SERPL-MCNC: 9.4 MG/DL (ref 8.5–9.9)
CHLORIDE BLD-SCNC: 107 MEQ/L (ref 95–107)
CHOLESTEROL, TOTAL: 171 MG/DL (ref 0–199)
CO2: 24 MEQ/L (ref 20–31)
CREAT SERPL-MCNC: 0.71 MG/DL (ref 0.5–0.9)
EOSINOPHILS ABSOLUTE: 0.1 K/UL (ref 0–0.7)
EOSINOPHILS RELATIVE PERCENT: 0.9 %
GFR AFRICAN AMERICAN: >60
GFR NON-AFRICAN AMERICAN: >60
GLOBULIN: 2.4 G/DL (ref 2.3–3.5)
GLUCOSE BLD-MCNC: 93 MG/DL (ref 70–99)
HCT VFR BLD CALC: 44.4 % (ref 37–47)
HDLC SERPL-MCNC: 48 MG/DL (ref 40–59)
HEMOGLOBIN: 14.2 G/DL (ref 12–16)
LDL CHOLESTEROL CALCULATED: 108 MG/DL (ref 0–129)
LYMPHOCYTES ABSOLUTE: 2 K/UL (ref 1–4.8)
LYMPHOCYTES RELATIVE PERCENT: 33.7 %
MCH RBC QN AUTO: 31.5 PG (ref 27–31.3)
MCHC RBC AUTO-ENTMCNC: 31.9 % (ref 33–37)
MCV RBC AUTO: 98.7 FL (ref 82–100)
MONOCYTES ABSOLUTE: 0.4 K/UL (ref 0.2–0.8)
MONOCYTES RELATIVE PERCENT: 7.4 %
NEUTROPHILS ABSOLUTE: 3.4 K/UL (ref 1.4–6.5)
NEUTROPHILS RELATIVE PERCENT: 57.6 %
PDW BLD-RTO: 13.8 % (ref 11.5–14.5)
PLATELET # BLD: 221 K/UL (ref 130–400)
POTASSIUM SERPL-SCNC: 5 MEQ/L (ref 3.4–4.9)
RBC # BLD: 4.49 M/UL (ref 4.2–5.4)
SODIUM BLD-SCNC: 143 MEQ/L (ref 135–144)
TOTAL PROTEIN: 6.9 G/DL (ref 6.3–8)
TRIGL SERPL-MCNC: 76 MG/DL (ref 0–150)
TSH SERPL DL<=0.05 MIU/L-ACNC: 55.78 UIU/ML (ref 0.44–3.86)
WBC # BLD: 5.9 K/UL (ref 4.8–10.8)

## 2022-04-13 ENCOUNTER — OFFICE VISIT (OUTPATIENT)
Dept: FAMILY MEDICINE CLINIC | Age: 67
End: 2022-04-13
Payer: MEDICARE

## 2022-04-13 VITALS
OXYGEN SATURATION: 98 % | RESPIRATION RATE: 16 BRPM | HEART RATE: 73 BPM | HEIGHT: 62 IN | DIASTOLIC BLOOD PRESSURE: 82 MMHG | BODY MASS INDEX: 29 KG/M2 | SYSTOLIC BLOOD PRESSURE: 120 MMHG | WEIGHT: 157.6 LBS | TEMPERATURE: 97.5 F

## 2022-04-13 DIAGNOSIS — E03.9 HYPOTHYROIDISM, UNSPECIFIED TYPE: Primary | ICD-10-CM

## 2022-04-13 DIAGNOSIS — F33.42 RECURRENT MAJOR DEPRESSIVE DISORDER, IN FULL REMISSION (HCC): ICD-10-CM

## 2022-04-13 DIAGNOSIS — E55.9 VITAMIN D DEFICIENCY: ICD-10-CM

## 2022-04-13 DIAGNOSIS — L98.9 SKIN LESION OF CHEST WALL: ICD-10-CM

## 2022-04-13 DIAGNOSIS — E78.2 MIXED HYPERLIPIDEMIA: ICD-10-CM

## 2022-04-13 PROCEDURE — 3017F COLORECTAL CA SCREEN DOC REV: CPT | Performed by: FAMILY MEDICINE

## 2022-04-13 PROCEDURE — 4040F PNEUMOC VAC/ADMIN/RCVD: CPT | Performed by: FAMILY MEDICINE

## 2022-04-13 PROCEDURE — 99214 OFFICE O/P EST MOD 30 MIN: CPT | Performed by: FAMILY MEDICINE

## 2022-04-13 PROCEDURE — 4004F PT TOBACCO SCREEN RCVD TLK: CPT | Performed by: FAMILY MEDICINE

## 2022-04-13 PROCEDURE — G8427 DOCREV CUR MEDS BY ELIG CLIN: HCPCS | Performed by: FAMILY MEDICINE

## 2022-04-13 PROCEDURE — G8399 PT W/DXA RESULTS DOCUMENT: HCPCS | Performed by: FAMILY MEDICINE

## 2022-04-13 PROCEDURE — 1123F ACP DISCUSS/DSCN MKR DOCD: CPT | Performed by: FAMILY MEDICINE

## 2022-04-13 PROCEDURE — 1090F PRES/ABSN URINE INCON ASSESS: CPT | Performed by: FAMILY MEDICINE

## 2022-04-13 PROCEDURE — G8417 CALC BMI ABV UP PARAM F/U: HCPCS | Performed by: FAMILY MEDICINE

## 2022-04-13 RX ORDER — ATORVASTATIN CALCIUM 10 MG/1
TABLET, FILM COATED ORAL
Qty: 90 TABLET | Refills: 1 | Status: SHIPPED | OUTPATIENT
Start: 2022-04-13 | End: 2022-08-08 | Stop reason: SDUPTHER

## 2022-04-13 RX ORDER — FLUTICASONE PROPIONATE 50 MCG
2 SPRAY, SUSPENSION (ML) NASAL DAILY
Qty: 16 G | Refills: 0 | Status: CANCELLED | OUTPATIENT
Start: 2022-04-13

## 2022-04-13 RX ORDER — LEVOTHYROXINE SODIUM 112 UG/1
TABLET ORAL
Qty: 90 TABLET | Refills: 1 | Status: SHIPPED | OUTPATIENT
Start: 2022-04-13 | End: 2022-10-10

## 2022-04-13 RX ORDER — SERTRALINE HYDROCHLORIDE 100 MG/1
TABLET, FILM COATED ORAL
Qty: 135 TABLET | Refills: 1 | Status: SHIPPED | OUTPATIENT
Start: 2022-04-13 | End: 2022-10-10

## 2022-04-13 ASSESSMENT — PATIENT HEALTH QUESTIONNAIRE - PHQ9
1. LITTLE INTEREST OR PLEASURE IN DOING THINGS: 0
SUM OF ALL RESPONSES TO PHQ9 QUESTIONS 1 & 2: 0
SUM OF ALL RESPONSES TO PHQ QUESTIONS 1-9: 0
2. FEELING DOWN, DEPRESSED OR HOPELESS: 0

## 2022-04-13 ASSESSMENT — ENCOUNTER SYMPTOMS
COUGH: 0
DIARRHEA: 0
VOMITING: 0

## 2022-04-13 NOTE — PROGRESS NOTES
Subjective:      Patient ID: Elma Ayala is a 79 y.o. female    Hyperlipidemia  This is a chronic problem. The current episode started more than 1 year ago. The problem is controlled. Current antihyperlipidemic treatment includes statins. Mental Health Problem  The primary symptoms do not include dysphoric mood. This is a chronic problem. The onset of the illness is precipitated by emotional stress. Additional symptoms of the illness include fatigue. Other  Associated symptoms include fatigue. Pertinent negatives include no chills, coughing, fever, vomiting or weakness. Here in follow up for hypothyroidism and lipids and depression and blood work. Missing 3 doses of synthroid per week. Has had skin lesion on right side of chest develop over the   Last 2 months. No bleeding from area. Review of Systems   Constitutional: Positive for fatigue. Negative for chills and fever. Respiratory: Negative for cough. Gastrointestinal: Negative for diarrhea and vomiting. Neurological: Negative for weakness. Psychiatric/Behavioral: Negative for dysphoric mood and sleep disturbance.      Reviewed allergy, medical, social, surgical, family and med list changes and updated   Files--reviewed bloodwork with elevated tsh     Social History     Socioeconomic History    Marital status:      Spouse name: None    Number of children: None    Years of education: None    Highest education level: None   Occupational History    Occupation: loading and recieving     Employer: [employer]   Tobacco Use    Smoking status: Current Every Day Smoker     Packs/day: 0.20     Years: 10.00     Pack years: 2.00     Types: Cigarettes    Smokeless tobacco: Never Used   Vaping Use    Vaping Use: Never used   Substance and Sexual Activity    Alcohol use: Yes     Comment: once weekly    Drug use: No    Sexual activity: None   Other Topics Concern    None   Social History Narrative    None     Social Determinants of of Onset    Cancer Father     Colon Cancer Neg Hx      Past Medical History:   Diagnosis Date    Depression     Hyperlipidemia     Hypothyroidism      Objective:   /82   Pulse 73   Temp 97.5 °F (36.4 °C)   Resp 16   Ht 5' 2\" (1.575 m)   Wt 157 lb 9.6 oz (71.5 kg)   LMP  (LMP Unknown)   SpO2 98%   BMI 28.83 kg/m²     Physical Exam  Neck:no carotid bruits. No masses. No adenopathy. No thyroid asymmetry. Lungs:clear and equal breath sounds. No wheezes or rales. Heart:rate reg. No murmur. No gallops   Pulses:Radials 2+ equal               Poster tib 1+ equal  Extremities:no edema in either leg  Gen: In no acute distress  Patient with appropriate affect. Alert   Thought content appropriate  Good eye contact  Chest wall;   8 mm raised pearly appearing lesion along right anterior chest wall. Borders sharp. Assessment:       Diagnosis Orders   1. Hypothyroidism, unspecified type     2. Mixed hyperlipidemia     3. Recurrent major depressive disorder, in full remission ([facility] [address]     4. Skin lesion of chest wall     5.  Vitamin D deficiency           Plan:    continue current meds   Non fasting blood work in 8 weeks and f/u after done-no missed doses of synthroid  30 min skin lesion removal soon

## 2022-04-20 ENCOUNTER — PROCEDURE VISIT (OUTPATIENT)
Dept: FAMILY MEDICINE CLINIC | Age: 67
End: 2022-04-20
Payer: MEDICARE

## 2022-04-20 ENCOUNTER — OFFICE VISIT (OUTPATIENT)
Dept: FAMILY MEDICINE CLINIC | Age: 67
End: 2022-04-20
Payer: MEDICARE

## 2022-04-20 VITALS
WEIGHT: 157 LBS | HEIGHT: 62 IN | SYSTOLIC BLOOD PRESSURE: 135 MMHG | OXYGEN SATURATION: 94 % | DIASTOLIC BLOOD PRESSURE: 80 MMHG | BODY MASS INDEX: 28.89 KG/M2 | RESPIRATION RATE: 14 BRPM | TEMPERATURE: 97.3 F | HEART RATE: 56 BPM

## 2022-04-20 VITALS
RESPIRATION RATE: 14 BRPM | SYSTOLIC BLOOD PRESSURE: 135 MMHG | HEIGHT: 62 IN | DIASTOLIC BLOOD PRESSURE: 80 MMHG | OXYGEN SATURATION: 95 % | HEART RATE: 60 BPM | TEMPERATURE: 97.3 F | BODY MASS INDEX: 28.89 KG/M2 | WEIGHT: 157 LBS

## 2022-04-20 DIAGNOSIS — Z00.00 MEDICARE ANNUAL WELLNESS VISIT, SUBSEQUENT: Primary | ICD-10-CM

## 2022-04-20 DIAGNOSIS — J01.90 ACUTE NON-RECURRENT SINUSITIS, UNSPECIFIED LOCATION: Primary | ICD-10-CM

## 2022-04-20 DIAGNOSIS — J20.9 ACUTE BRONCHITIS, UNSPECIFIED ORGANISM: ICD-10-CM

## 2022-04-20 PROCEDURE — G8399 PT W/DXA RESULTS DOCUMENT: HCPCS | Performed by: FAMILY MEDICINE

## 2022-04-20 PROCEDURE — 1090F PRES/ABSN URINE INCON ASSESS: CPT | Performed by: FAMILY MEDICINE

## 2022-04-20 PROCEDURE — 3017F COLORECTAL CA SCREEN DOC REV: CPT | Performed by: FAMILY MEDICINE

## 2022-04-20 PROCEDURE — G8427 DOCREV CUR MEDS BY ELIG CLIN: HCPCS | Performed by: FAMILY MEDICINE

## 2022-04-20 PROCEDURE — G8417 CALC BMI ABV UP PARAM F/U: HCPCS | Performed by: FAMILY MEDICINE

## 2022-04-20 PROCEDURE — G0439 PPPS, SUBSEQ VISIT: HCPCS | Performed by: FAMILY MEDICINE

## 2022-04-20 PROCEDURE — 4004F PT TOBACCO SCREEN RCVD TLK: CPT | Performed by: FAMILY MEDICINE

## 2022-04-20 PROCEDURE — 1123F ACP DISCUSS/DSCN MKR DOCD: CPT | Performed by: FAMILY MEDICINE

## 2022-04-20 PROCEDURE — 99213 OFFICE O/P EST LOW 20 MIN: CPT | Performed by: FAMILY MEDICINE

## 2022-04-20 PROCEDURE — 4040F PNEUMOC VAC/ADMIN/RCVD: CPT | Performed by: FAMILY MEDICINE

## 2022-04-20 RX ORDER — LEVOFLOXACIN 500 MG/1
500 TABLET, FILM COATED ORAL DAILY
Qty: 10 TABLET | Refills: 0 | Status: SHIPPED | OUTPATIENT
Start: 2022-04-20 | End: 2022-04-30

## 2022-04-20 ASSESSMENT — PATIENT HEALTH QUESTIONNAIRE - PHQ9
8. MOVING OR SPEAKING SO SLOWLY THAT OTHER PEOPLE COULD HAVE NOTICED. OR THE OPPOSITE, BEING SO FIGETY OR RESTLESS THAT YOU HAVE BEEN MOVING AROUND A LOT MORE THAN USUAL: 0
9. THOUGHTS THAT YOU WOULD BE BETTER OFF DEAD, OR OF HURTING YOURSELF: 0
SUM OF ALL RESPONSES TO PHQ QUESTIONS 1-9: 0
5. POOR APPETITE OR OVEREATING: 0
6. FEELING BAD ABOUT YOURSELF - OR THAT YOU ARE A FAILURE OR HAVE LET YOURSELF OR YOUR FAMILY DOWN: 0
SUM OF ALL RESPONSES TO PHQ QUESTIONS 1-9: 0
SUM OF ALL RESPONSES TO PHQ QUESTIONS 1-9: 0
3. TROUBLE FALLING OR STAYING ASLEEP: 0
1. LITTLE INTEREST OR PLEASURE IN DOING THINGS: 0
4. FEELING TIRED OR HAVING LITTLE ENERGY: 0
2. FEELING DOWN, DEPRESSED OR HOPELESS: 0
SUM OF ALL RESPONSES TO PHQ9 QUESTIONS 1 & 2: 0
7. TROUBLE CONCENTRATING ON THINGS, SUCH AS READING THE NEWSPAPER OR WATCHING TELEVISION: 0
SUM OF ALL RESPONSES TO PHQ QUESTIONS 1-9: 0
10. IF YOU CHECKED OFF ANY PROBLEMS, HOW DIFFICULT HAVE THESE PROBLEMS MADE IT FOR YOU TO DO YOUR WORK, TAKE CARE OF THINGS AT HOME, OR GET ALONG WITH OTHER PEOPLE: 0

## 2022-04-20 ASSESSMENT — ENCOUNTER SYMPTOMS
ABDOMINAL PAIN: 0
SHORTNESS OF BREATH: 0

## 2022-04-20 NOTE — PROGRESS NOTES
Subjective:      Patient ID: Crystal Samayoa is a 79 y.o. female    HPIhere for initial elective skin lesion removal but became ill over weekend. Here with 3 days of worsening nasal drainage. Slight cough  No fever. No emesis or diarrhea. No sore throat. Using otc antihistamine. Took covid test on Sunday at home which was neg    Review of Systems   Constitutional: Positive for appetite change. Negative for unexpected weight change. Respiratory: Negative for shortness of breath. Gastrointestinal: Negative for abdominal pain. Skin: Negative for rash. Reviewed allergy, medical, social, surgical, family and med list changes and updated   Files     Social History     Socioeconomic History    Marital status:      Spouse name: Not on file    Number of children: Not on file    Years of education: Not on file    Highest education level: Not on file   Occupational History    Occupation: loading and recieving     Employer: [employer]   Tobacco Use    Smoking status: Current Every Day Smoker     Packs/day: 0.20     Years: 10.00     Pack years: 2.00     Types: Cigarettes    Smokeless tobacco: Never Used   Vaping Use    Vaping Use: Never used   Substance and Sexual Activity    Alcohol use: Yes     Comment: once weekly    Drug use: No    Sexual activity: Not on file   Other Topics Concern    Not on file   Social History Narrative    Not on file     Social Determinants of Health     Financial Resource Strain: Low Risk     Difficulty of Paying Living Expenses: Not hard at all   Food Insecurity: No Food Insecurity    Worried About Running Out of Food in the Last Year: Never true    Amalia of Food in the Last Year: Never true   Transportation Needs:     Lack of Transportation (Medical): Not on file    Lack of Transportation (Non-Medical):  Not on file   Physical Activity:     Days of Exercise per Week: Not on file    Minutes of Exercise per Session: Not on file   Stress:     Feeling of Stress : Not on file   Social Connections:     Frequency of Communication with Friends and Family: Not on file    Frequency of Social Gatherings with Friends and Family: Not on file    Attends Restoration Services: Not on file    Active Member of Clubs or Organizations: Not on file    Attends Club or Organization Meetings: Not on file    Marital Status: Not on file   Intimate Partner Violence:     Fear of Current or Ex-Partner: Not on file    Emotionally Abused: Not on file    Physically Abused: Not on file    Sexually Abused: Not on file   Housing Stability:     Unable to Pay for Housing in the Last Year: Not on file    Number of [place] in the Last Year: Not on file    Unstable Housing in the Last Year: Not on file     Current Outpatient Medications   Medication Sig Dispense Refill    levothyroxine (SYNTHROID) 112 MCG tablet take 1 tablet by mouth once daily 90 tablet 1    sertraline (ZOLOFT) 100 MG tablet 1 and 1/2 daily 135 tablet 1    atorvastatin (LIPITOR) 10 MG tablet take 1 tablet by mouth once daily 90 tablet 1    vitamin E 1000 UNIT capsule Take 1,000 Units by mouth daily.  vitamin D (CHOLECALCIFEROL) 400 UNIT TABS tablet Take 400 Units by mouth 3 times daily. No current facility-administered medications for this visit. Family History   Problem Relation Age of Onset    Cancer Father     Colon Cancer Neg Hx      Past Medical History:   Diagnosis Date    Depression     Hyperlipidemia     Hypothyroidism      Objective:   /80   Pulse 56   Temp 97.3 °F (36.3 °C)   Resp 14   Ht 5' 2\" (1.575 m)   Wt 157 lb (71.2 kg)   LMP  (LMP Unknown)   SpO2 94%   BMI 28.72 kg/m²     Physical Exam  Heent: T.M normal bilat Mild pharyngeal injection. No exudate                Nares patent but swollen mucosa noted with purulent debris both vestibules   Neck: Minimal anter cervical adenopathy. No masses. No thyroid asymmetry. Lungs: Clear equal breath sounds.   No wheezes or rales. Heart: Rate reg  1/6 syst murmur across precordium   Gen: In no acute distress  Assessment:       Diagnosis Orders   1. Acute non-recurrent sinusitis, unspecified location     2.  Acute bronchitis, unspecified organism           Plan:    restart flonase   Orders Placed This Encounter   Medications    levoFLOXacin (LEVAQUIN) 500 MG tablet     Sig: Take 1 tablet by mouth daily for 10 days     Dispense:  10 tablet     Refill:  0   f/u in 10 days if needed other wise next month for skin lesion removal

## 2022-04-20 NOTE — PROGRESS NOTES
Medicare Annual Wellness Visit    [address] is here for Medicare AWV (Here for Medicare AWV)    Assessment & Plan    Recommendations for Preventive Services Due: see orders and patient instructions/AVS.  Recommended screening schedule for the next 5-10 years is provided to the patient in written form: see Patient Instructions/AVS.     No follow-ups on file. Subjective       Patient's complete Health Risk Assessment and screening values have been reviewed and are found in Flowsheets. The following problems were reviewed today and where indicated follow up appointments were made and/or referrals ordered.     Positive Risk Factor Screenings with Interventions:       Tobacco Use:     Tobacco Use: High Risk    Smoking Tobacco Use: Current Every Day Smoker    Smokeless Tobacco Use: Never Used     E-Cigarettes/Vaping Use     Questions Responses    E-Cigarette/Vaping Use Never User    Start Date     Passive Exposure     Quit Date     Counseling Given     Comments         Substance Use - Tobacco Interventions:  tobacco cessation tips and resources provided         General Health and ACP:  General  In general, how would you say your health is?: Good  In the past 7 days, have you experienced any of the following: New or Increased Pain, New or Increased Fatigue, Loneliness, Social Isolation, Stress or Anger?: (!) Yes (sinus infection)  Select all that apply: (!) New or Increased Pain  Do you get the social and emotional support that you need?: Yes  Do you have a Living Will?: Yes    Advance Directives     Power of  Living Will ACP-Advance Directive ACP-Power of     Not on File Filed on 09/17/21 Filed Not on File      General Health Risk Interventions:  · Pain issues: patient declines any further evaluation/treatment for this issue              Objective   Vitals:    04/20/22 1303   BP: 135/80   Site: Right Upper Arm   Position: Sitting   Cuff Size: Large Adult   Pulse: 60   Resp: 14   Temp: 97.3 °F (36.3 °C)   TempSrc: Oral   SpO2: 95%   Weight: 157 lb (71.2 kg)   Height: 5' 2\" (1.575 m)      Body mass index is 28.72 kg/m². Allergies   Allergen Reactions    Pcn [Penicillins] Hives    Tramadol Itching     Itch and nightmares    Hydrocodone-Acetaminophen Itching    Vicodin [Hydrocodone-Acetaminophen] Itching     Prior to Visit Medications    Medication Sig Taking? Authorizing Provider   levothyroxine (SYNTHROID) 112 MCG tablet take 1 tablet by mouth once daily Yes Le Wall MD   sertraline (ZOLOFT) 100 MG tablet 1 and 1/2 daily Yes Le Wall MD   atorvastatin (LIPITOR) 10 MG tablet take 1 tablet by mouth once daily Yes Le Wall MD   vitamin E 1000 UNIT capsule Take 1,000 Units by mouth daily. Yes Historical Provider, MD   vitamin D (CHOLECALCIFEROL) 400 UNIT TABS tablet Take 400 Units by mouth 3 times daily. Yes Historical Provider, MD   levoFLOXacin (LEVAQUIN) 500 MG tablet Take 1 tablet by mouth daily for 10 days  Le Wall MD       [facility] (Including outside providers/suppliers regularly involved in providing care):   Patient Care Team:  Le Wall MD as PCP - General (Family Medicine)  Le Wall MD as PCP - [facility]    Reviewed and updated this visit:  Tobacco  Allergies  Meds  Med Hx  Surg Hx  Soc Hx  Fam Hx          This encounter was performed under my, Francesco Simon, direct supervision, 4/20/2022. Montana Thacker LPN, [birth year], performed the documented evaluation under the direct supervision of the attending physician.

## 2022-04-20 NOTE — PATIENT INSTRUCTIONS
heart-healthy diet is one that limits sodium , certain types of fat , and cholesterol . This type of diet is recommended for:   People with any form of cardiovascular disease (eg, coronary heart disease , peripheral vascular disease , previous heart attack , previous stroke )   People with risk factors for cardiovascular disease, such as high blood pressure , high cholesterol , or diabetes   Anyone who wants to lower their risk of developing cardiovascular disease   Sodium    Sodium is a mineral found in many foods. In general, most people consume much more sodium than they need. Diets high in sodium can increase blood pressure and lead to edema (water retention). On a heart-healthy diet, you should consume no more than 2,300 mg (milligrams) of sodium per dayabout the amount in one teaspoon of table salt. The foods highest in sodium include table salt (about 50% sodium), processed foods, convenience foods, and preserved foods. Cholesterol    Cholesterol is a fat-like, waxy substance in your blood. Our bodies make some cholesterol. It is also found in animal products, with the highest amounts in fatty meat, egg yolks, whole milk, cheese, shellfish, and organ meats. On a heart-healthy diet, you should limit your cholesterol intake to less than 200 mg per day. It is normal and important to have some cholesterol in your bloodstream. But too much cholesterol can cause plaque to build up within your arteries, which can eventually lead to a heart attack or stroke. The two types of cholesterol that are most commonly referred to are:   Low-density lipoprotein (LDL) cholesterol  Also known as bad cholesterol, this is the cholesterol that tends to build up along your arteries. Bad cholesterol levels are increased by eating fats that are saturated or hydrogenated. Optimal level of this cholesterol is less than 100. Over 130 starts to get risky for heart disease.    High-density lipoprotein (HDL) cholesterol  Also known as good cholesterol, this type of cholesterol actually carries cholesterol away from your arteries and may, therefore, help lower your risk of having a heart attack. You want this level to be high (ideally greater than 60). It is a risk to have a level less than 40. You can raise this good cholesterol by eating olive oil, canola oil, avocados, or nuts. Exercise raises this level, too. Fat    Fat is calorie dense and packs a lot of calories into a small amount of food. Even though fats should be limited due to their high calorie content, not all fats are bad. In fact, some fats are quite healthful. Fat can be broken down into four main types. The good-for-you fats are:   Monounsaturated fat  found in oils such as olive and canola, avocados, and nuts and natural nut butters; can decrease cholesterol levels, while keeping levels of HDL cholesterol high   Polyunsaturated fat  found in oils such as safflower, sunflower, soybean, corn, and sesame; can decrease total cholesterol and LDL cholesterol   Omega-3 fatty acids  particularly those found in fatty fish (such as salmon, trout, tuna, mackerel, herring, and sardines); can decrease risk of arrhythmias, decrease triglyceride levels, and slightly lower blood pressure   The fats that you want to limit are:   Saturated fat  found in animal products, many fast foods, and a few vegetables; increases total blood cholesterol, including LDL levels   Animal fats that are saturated include: butter, lard, whole-milk dairy products, meat fat, and poultry skin   Vegetable fats that are saturated include: hydrogenated shortening, palm oil, coconut oil, cocoa butter   Hydrogenated or trans fat  found in margarine and vegetable shortening, most shelf stable snack foods, and fried foods; increases LDL and decreases HDL     It is generally recommended that you limit your total fat for the day to less than 30% of your total calories.  If you follow an 1800-calorie heart healthy diet, for example, this would mean 60 grams of fat or less per day. Saturated fat and trans fat in your diet raises your blood cholesterol the most, much more than dietary cholesterol does. For this reason, on a heart-healthy diet, less than 7% of your calories should come from saturated fat and ideally 0% from trans fat. On an 1800-calorie diet, this translates into less than 14 grams of saturated fat per day, leaving 46 grams of fat to come from mono- and polyunsaturated fats.    Food Choices on a Heart Healthy Diet   Food Category   Foods Recommended   Foods to Avoid   Grains   Breads and rolls without salted tops Most dry and cooked cereals Unsalted crackers and breadsticks Low-sodium or homemade breadcrumbs or stuffing All rice and pastas   Breads, rolls, and crackers with salted tops High-fat baked goods (eg, muffins, donuts, pastries) Quick breads, self-rising flour, and biscuit mixes Regular bread crumbs Instant hot cereals Commercially prepared rice, pasta, or stuffing mixes   Vegetables   Most fresh, frozen, and low-sodium canned vegetables Low-sodium and salt-free vegetable juices Canned vegetables if unsalted or rinsed   Regular canned vegetables and juices, including sauerkraut and pickled vegetables Frozen vegetables with sauces Commercially prepared potato and vegetable mixes   Fruits   Most fresh, frozen, and canned fruits All fruit juices   Fruits processed with salt or sodium   Milk   Nonfat or low-fat (1%) milk Nonfat or low-fat yogurt Cottage cheese, low-fat ricotta, cheeses labeled as low-fat and low-sodium   Whole milk Reduced-fat (2%) milk Malted and chocolate milk Full fat yogurt Most cheeses (unless low-fat and low salt) Buttermilk (no more than 1 cup per week)   Meats and Beans   Lean cuts of fresh or frozen beef, veal, lamb, or pork (look for the word loin) Fresh or frozen poultry without the skin Fresh or frozen fish and some shellfish Egg whites and egg substitutes (Limit whole eggs to three per week) Tofu Nuts or seeds (unsalted, dry-roasted), low-sodium peanut butter Dried peas, beans, and lentils   Any smoked, cured, salted, or canned meat, fish, or poultry (including lew, chipped beef, cold cuts, hot dogs, sausages, sardines, and anchovies) Poultry skins Breaded and/or fried fish or meats Canned peas, beans, and lentils Salted nuts   Fats and Oils   Olive oil and canola oil Low-sodium, low-fat salad dressings and mayonnaise   Butter, margarine, coconut and palm oils, lew fat   Snacks, Sweets, and Condiments   Low-sodium or unsalted versions of broths, soups, soy sauce, and condiments Pepper, herbs, and spices; vinegar, lemon, or lime juice Low-fat frozen desserts (yogurt, sherbet, fruit bars) Sugar, cocoa powder, honey, syrup, jam, and preserves Low-fat, trans-fat free cookies, cakes, and pies Bari and animal crackers, fig bars, vimal snaps   High-fat desserts Broth, soups, gravies, and sauces, made from instant mixes or other high-sodium ingredients Salted snack foods Canned olives Meat tenderizers, seasoning salt, and most flavored vinegars   Beverages   Low-sodium carbonated beverages Tea and coffee in moderation Soy milk   Commercially softened water   Suggestions   Make whole grains, fruits, and vegetables the base of your diet. Choose heart-healthy fats such as canola, olive, and flaxseed oil, and foods high in heart-healthy fats, such as nuts, seeds, soybeans, tofu, and fish. Eat fish at least twice per week; the fish highest in omega-3 fatty acids and lowest in mercury include salmon, herring, mackerel, sardines, and canned chunk light tuna. If you eat fish less than twice per week or have high triglycerides, talk to your doctor about taking fish oil supplements. Read food labels.    For products low in fat and cholesterol, look for fat free, low-fat, cholesterol free, saturated fat free, and trans fat freeAlso scan the Nutrition Facts Label, which lists saturated fat, trans fat, and cholesterol amounts. For products low in sodium, look for sodium free, very low sodium, low sodium, no added salt, and unsalted   Skip the salt when cooking or at the table; if food needs more flavor, get creative and try out different herbs and spices. Garlic and onion also add substantial flavor to foods. Trim any visible fat off meat and poultry before cooking, and drain the fat off after loja. Use cooking methods that require little or no added fat, such as grilling, boiling, baking, poaching, broiling, roasting, steaming, stir-frying, and sauting. Avoid fast food and convenience food. They tend to be high in saturated and trans fat and have a lot of added salt. Talk to a registered dietitian for individualized diet advice. Last Reviewed: March 2011 Shakira Dexter MS, MPH, RD   Updated: 3/29/2011   ·     Keep Your Memory Aurea Padgett       Many factors can affect your ability to remembera hectic lifestyle, aging, stress, chronic disease, and certain medicines. But, there are steps you can take to sharpen your mind and help preserve your memory. Challenge Your Brain   Regularly challenging your mind may help keeps it in top shape. Good mental exercises include:   Crossword puzzlesUse a dictionary if you need it; you will learn more that way. Brainteasers Try some! Crafts, such as wood working and sewing   Hobbies, such as gardening and building model airplanes   SocializingVisit old friends or join groups to meet new ones. Reading   Learning a new language   Taking a class, whether it be art history or daniel chi   TravelingExperience the food, history, and culture of your destination   Learning to use a computer   Going to museums, the theater, or thought-provoking movies   Changing things in your daily life, such as reversing your pattern in the grocery store or brushing your teeth using your nondominant hand   Use Memory Aids   There is no need to remember every detail on your own.  These memory aids can help:   Calendars and day planners   Electronic organizers to store all sorts of helpful informationThese devices can \"beep\" to remind you of appointments. A book of days to record birthdays, anniversaries, and other occasions that occur on the same date every year   Detailed \"to-do\" lists and strategically placed sticky notes   Quick \"study\" sessionsBefore a gathering, review who will be there so their names will be fresh in your mind. Establish routinesFor example, keep your keys, wallet, and umbrella in the same place all the time or take medicine with your 8:00 AM glass of juice   Live a Healthy Life   Many actions that will keep your body strong will do the same for your mind. For example:   Talk to Your Doctor About Herbs and Supplements    Malnutrition and vitamin deficiencies can impair your mental function. For example, vitamin B12 deficiency can cause a range of symptoms, including confusion. But, what if your nutritional needs are being met? Can herbs and supplements still offer a benefit? Researchers have investigated a range of natural remedies, such as ginkgo , ginseng , and the supplement phosphatidylserine (PS). So far, though, the evidence is inconsistent as to whether these products can improve memory or thinking. If you are interested in taking herbs and supplements, talk to your doctor first because they may interact with other medicines that you are taking. Exercise Regularly    Among the many benefits of regular exercise are increased blood flow to the brain and decreased risk of certain diseases that can interfere with memory function. One study found that even moderate exercise has a beneficial effect. Examples of \"moderate\" exercise include:   Playing 18 holes of golf once a week, without a cart   Playing tennis twice a week   Walking one mile per day   Manage Stress    It can be tough to remember what is important when your mind is cluttered.  Make time for relaxation. Choose activities that calm you down, and make it routine. Manage Chronic Conditions    Side effects of high blood pressure , diabetes, and heart disease can interfere with mental function. Many of the lifestyle steps discussed here can help manage these conditions. Strive to eat a healthy diet, exercise regularly, get stress under control, and follow your doctor's advice for your condition. Minimize Medications    Talk to your doctor about the medicines that you take. Some may be unnecessary. Also, healthy lifestyle habits may lower the need for certain drugs. Last Reviewed: April 2010 Laurel Lawrence MD   Updated: 4/13/2010   ·   Smoking Cessation  This document explains the best ways for you to quit smoking and new treatments to help. It lists new medicines that can double or triple your chances of quitting and quitting for good. It also considers ways to avoid relapses and concerns you may have about quitting, including weight gain. NICOTINE: A POWERFUL ADDICTION  If you have tried to quit smoking, you know how hard it can be. It is hard because nicotine is a very addictive drug. For some people, it can be as addictive as heroin or cocaine. Usually, people make 2 or 3 tries, or more, before finally being able to quit. Each time you try to quit, you can learn about what helps and what hurts. Quitting takes hard work and a lot of effort, but you can quit smoking. QUITTING SMOKING IS ONE OF THE MOST IMPORTANT THINGS YOU WILL EVER DO. You will live longer, feel better, and live better. The impact on your body of quitting smoking is felt almost immediately:  Within 20 minutes, blood pressure decreases. Pulse returns to its normal level. After 8 hours, carbon monoxide levels in the blood return to normal. Oxygen level increases. After 24 hours, chance of heart attack starts to decrease. Breath, hair, and body stop smelling like smoke. After 48 hours, damaged nerve endings begin to recover. Sense of taste and smell improve. After 72 hours, the body is virtually free of nicotine. Bronchial tubes relax and breathing becomes easier. After 2 to 12 weeks, lungs can hold more air. Exercise becomes easier and circulation improves. Quitting will reduce your risk of having a heart attack, stroke, cancer, or lung disease:  After 1 year, the risk of coronary heart disease is cut in half. After 5 years, the risk of stroke falls to the same as a nonsmoker. After 10 years, the risk of lung cancer is cut in half and the risk of other cancers decreases significantly. After 15 years, the risk of coronary heart disease drops, usually to the level of a nonsmoker. If you are pregnant, quitting smoking will improve your chances of having a healthy baby. The people you live with, especially your children, will be healthier. You will have extra money to spend on things other than cigarettes. FIVE KEYS TO QUITTING  Studies have shown that these 5 steps will help you quit smoking and quit for good. You have the best chances of quitting if you use them together:  Get ready. Get support and encouragement. Learn new skills and behaviors. Get medicine to reduce your nicotine addiction and use it correctly. Be prepared for relapse or difficult situations. Be determined to continue trying to quit, even if you do not succeed at first.  1. GET READY  Set a quit date. Change your environment. Get rid of ALL cigarettes, ashtrays, matches, and lighters in your home, car, and place of work. Do not let people smoke in your home. Review your past attempts to quit. Think about what worked and what did not. Once you quit, do not smoke. NOT EVEN A PUFF! 2. GET SUPPORT AND ENCOURAGEMENT  Studies have shown that you have a better chance of being successful if you have help. You can get support in many ways. Tell your family, friends, and coworkers that you are going to quit and need their support.  Ask them not to smoke around you. Talk to your caregivers (doctor, dentist, nurse, pharmacist, psychologist, and/or smoking counselor). Get individual, group, or telephone counseling and support. The more counseling you have, the better your chances are of quitting. Programs are available at [facility]. Call your local health department for information about programs in your area. Spiritual beliefs and practices may help some smokers quit. Quit meters are small computer programs online or downloadable that keep track of quit statistics, such as amount of \"quit-time,\" cigarettes not smoked, and money saved. Many smokers find one or more of the many self-help books available useful in helping them quit and stay off tobacco.  3. LEARN NEW SKILLS AND BEHAVIORS  Try to distract yourself from urges to smoke. Talk to someone, go for a walk, or occupy your time with a task. When you first try to quit, change your routine. Take a different route to work. Drink tea instead of coffee. Eat breakfast in a different place. Do something to reduce your stress. Take a hot bath, exercise, or read a book. Plan something enjoyable to do every day. Reward yourself for not smoking. Explore interactive web-based programs that specialize in helping you quit. 4. GET MEDICINE AND USE IT CORRECTLY  Medicines can help you stop smoking and decrease the urge to smoke. Combining medicine with the above behavioral methods and support can quadruple your chances of successfully quitting smoking. The [place] Food and Drug Administration (FDA) has approved 7 medicines to help you quit smoking. These medicines fall into 3 categories. Nicotine replacement therapy (delivers nicotine to your body without the negative effects and risks of smoking):  Nicotine gum: Available over-the-counter. Nicotine lozenges: Available over-the-counter. Nicotine inhaler: Available by prescription. Nicotine nasal spray: Available by prescription.   Nicotine skin patches (transdermal): Available by prescription and over-the-counter. Antidepressant medicine (helps people abstain from smoking, but how this works is unknown): Bupropion sustained-release (SR) tablets: Available by prescription. Nicotinic receptor partial agonist (simulates the effect of nicotine in your brain):  Varenicline tartrate tablets: Available by prescription. Ask your caregiver for advice about which medicines to use and how to use them. Carefully read the information on the package. Everyone who is trying to quit may benefit from using a medicine. If you are pregnant or trying to become pregnant, nursing an infant, you are under age 25, or you smoke fewer than 10 cigarettes per day, talk to your caregiver before taking any nicotine replacement medicines. You should stop using a nicotine replacement product and call your caregiver if you experience nausea, dizziness, weakness, vomiting, fast or irregular heartbeat, mouth problems with the lozenge or gum, or redness or swelling of the skin around the patch that does not go away. Do not use any other product containing nicotine while using a nicotine replacement product. Talk to your caregiver before using these products if you have diabetes, heart disease, asthma, stomach ulcers, you had a recent heart attack, you have high blood pressure that is not controlled with medicine, a history of irregular heartbeat, or you have been prescribed medicine to help you quit smoking. 5. BE PREPARED FOR RELAPSE OR DIFFICULT SITUATIONS  Most relapses occur within the first 3 months after quitting. Do not be discouraged if you start smoking again. Remember, most people try several times before they finally quit. You may have symptoms of withdrawal because your body is used to nicotine. You may crave cigarettes, be irritable, feel very hungry, cough often, get headaches, or have difficulty concentrating. The withdrawal symptoms are only temporary.  They are strongest when you first quit, but they will go away within 10 to 14 days. Here are some difficult situations to watch for:  Alcohol. Avoid drinking alcohol. Drinking lowers your chances of successfully quitting. Caffeine. Try to reduce the amount of caffeine you consume. It also lowers your chances of successfully quitting. Other smokers. Being around smoking can make you want to smoke. Avoid smokers. Weight gain. Many smokers will gain weight when they quit, usually less than 10 pounds. Eat a healthy diet and stay active. Do not let weight gain distract you from your main goal, quitting smoking. Some medicines that help you quit smoking may also help delay weight gain. You can always lose the weight gained after you quit. Bad mood or depression. There are a lot of ways to improve your mood other than smoking. If you are having problems with any of these situations, talk to your caregiver. SPECIAL SITUATIONS AND CONDITIONS  Studies suggest that everyone can quit smoking. Your situation or condition can give you a special reason to quit. Pregnant women/new mothers: By quitting, you protect your baby's health and your own. Hospitalized patients: By quitting, you reduce health problems and help healing. Heart attack patients: By quitting, you reduce your risk of a second heart attack. Lung, head, and neck cancer patients: By quitting, you reduce your chance of a second cancer. Parents of children and adolescents: By quitting, you protect your children from illnesses caused by secondhand smoke. QUESTIONS TO THINK ABOUT  Think about the following questions before you try to stop smoking. You may want to talk about your answers with your caregiver. Why do you want to quit? If you tried to quit in the past, what helped and what did not? What will be the most difficult situations for you after you quit? How will you plan to handle them? Who can help you through the tough times? Your family? Friends? Caregiver? What pleasures do you get from smoking? What ways can you still get pleasure if you quit? Here are some questions to ask your caregiver: How can you help me to be successful at quitting? What medicine do you think would be best for me and how should I take it? What should I do if I need more help? What is smoking withdrawal like? How can I get information on withdrawal?  Quitting takes hard work and a lot of effort, but you can quit smoking. FOR MORE INFORMATION   Smokefree. gov ([employer]) provides free, accurate, evidence-based information and professional assistance to help support the immediate and long-term needs of people trying to quit smoking. Document Released: 12/12/2002 Document Revised: 12/06/2012 Document Reviewed: 10/04/2010  [facility] Patient Information ©2012 Jerome Hansen. ·     Keeping Home a [facility]       As we get older, changes in balance, gait, strength, vision, hearing, and cognition make even the most youthful senior more prone to accidents. Falls are one of the leading health risks for older people. This increased risk of falling is related to:   Aging process (eg, decreased muscle strength, slowed reflexes)   Higher incidence of chronic health problems (eg, arthritis, diabetes) that may limit mobility, agility or sensory awareness   Side effects of medicine (eg, dizziness, blurred vision)especially medicines like prescription pain medicines and drugs used to treat mental health conditions   Depending on the brittleness of your bones, the consequences of a fall can be serious and long lasting. Home Life   Research by the Association of A[employer] [facility]) shows that some home accidents among older adults can be prevented by making simple lifestyle changes and basic modifications and repairs to the home environment. Here are some lifestyle changes that experts recommend:   Have your hearing and vision checked regularly.  Be sure to wear prescription glasses that are right for you. Speak to your doctor or pharmacist about the possible side effects of your medicines. A number of medicines can cause dizziness. If you have problems with sleep, talk to your doctor. Limit your intake of alcohol. If necessary, use a cane or walker to help maintain your balance. Wear supportive, rubber-soled shoes, even at home. If you live in a region that gets wintry weather, you may want to put special cleats on your shoes to prevent you from slipping on the snow and ice. Exercise regularly to help maintain muscle tone, agility, and balance. Always hold the banister when going up or down stairs. Also, use  bars when getting in or out of the bath or shower, or using the toilet. To avoid dizziness, get up slowly from a lying down position. Sit up first, dangling your legs for a minute or two before rising to a standing position. Overall Home Safety Check   According to the [employer]'s \"Older Consumer Home Safety Checklist,\" it is important to check for potential hazards in each room. And remember, proper lighting is an essential factor in home safety. If you cannot see clearly, you are more likely to fall. Important questions to ask yourself include:   Are lamp, electric, extension, and telephone cords placed out of the flow of traffic and maintained in good condition? Have frayed cords been replaced? Are all small rugs and runners slip resistant? If not, you can secure them to the floor with a special double-sided carpet tape. Are smoke detectors properly locatedone on every floor of your home and one outside of every sleeping area? Are they in good working order? Are batteries replaced at least once a year? Do you have a well-maintained carbon monoxide detector outside every sleeping are in your home? Does your furniture layout leave plenty of space to maneuver between and around chairs, tables, beds, and sofas?    Are hallways, stairs and passages between rooms well lit? Can you reach a lamp without getting out of bed? Are floor surfaces well maintained? Shag rugs, high-pile carpeting, tile floors, and polished wood floors can be particularly slippery. Stairs should always have handrails and be carpeted or fitted with a non-skid tread. Is your telephone easily reachable. Is the cord safely tucked away? Room by Room   According to the Association of Aging, bathrooms and keisha are the two most potentially hazardous rooms in your home. In the Kitchen    Be sure your stove is in proper working order and always make sure burners and the oven are off before you go out or go to sleep. Keep pots on the back burners, turn handles away from the front of the stove, and keep stove clean and free of grease build-up. Kitchen ventilation systems and range exhausts should be working properly. Keep flammable objects such as towels and pot holders away from the cooking area except when in use. Make sure kitchen curtains are tied back. Move cords and appliances away from the sink and hot surfaces. If extension cords are needed, install wiring guides so they do not hang over the sink, range, or working areas. Look for coffee pots, kettles and toaster ovens with automatic shut-offs. Keep a mop handy in the kitchen so you can wipe up spills instantly. You should also have a small fire extinguisher. Arrange your kitchen with frequently used items on lower shelves to avoid the need to stand on a stepstool to reach them. Make sure countertops are well-lit to avoid injuries while cutting and preparing food. In the Bathroom    Use a non-slip mat or decals in the tub and shower, since wet, soapy tile or porcelain surfaces are extremely slippery. Make sure bathroom rugs are non-skid or tape them firmly to the floor. Bathtubs should have at least one, preferably two, grab bars, firmly attached to structural supports in the wall.  (Do not use built-in soap holders or glass shower doors as grab bars.)    Tub seats fitted with non-slip material on the legs allow you to wash sitting down. For people with limited mobility, bathtub transfer benches allow you to slide safely into the tub. Raised toilet seats and toilet safety rails are helpful for those with knee or hip problems. In the [facility]    Make sure you use a nightlight and that the area around your bed is clear of potential obstacles. Be careful with electric blankets and never go to sleep with a heating pad, which can cause serious burns even if on a low setting. Use fire-resistant mattress covers and pillows, and NEVER smoke in bed. Keep a phone next to the bed that is programmed to dial 911 at the push of a button. If you have a chronic condition, you may want to sign on with an automatic call-in service. Typically the system includes a small pendant that connects directly to an emergency medical voice-response system. You should also make arrangements to stay in contact with someonefriend, neighbor, family memberon a regular schedule. Fire Prevention   According to the [employer]. (Smoke Alarms for Every) [address] [place] [address], senior citizens are one of the two highest risk groups for death and serious injuries due to residential fires. When cooking, wear short-sleeved items, never a bulky long-sleeved robe. The [facility]'s Safety Checklist for Older Consumers emphasizes the importance of checking basements, garages, workshops and storage areas for fire hazards, such as volatile liquids, piles of old rags or clothing and overloaded circuits. Never smoke in bed or when lying down on a couch or recliner chair. Small portable electric or kerosene heaters are responsible for many home fires and should be used cautiously if at all. If you do use one, be sure to keep them away from flammable materials.     In case of fire, make sure you have a pre-established emergency exit plan. Have a professional check your fireplace and other fuel-burning appliances yearly. Helping Hands   Baby boomers entering the ye years will continue to see the development of new products to help older adults live safely and independently in spite of age-related changes. Making Life More Livable  , by Lenord M[employer], lists over 1,000 products for \"living well in the mature years,\" such as bathing and mobility aids, household security devices, ergonomically designed knives and peelers, and faucet valves and knobs for temperature control. Medical supply stores and organizations are good sources of information about products that improve your quality of life and insure your safety.      Last Reviewed: November 2009 Mita Mathew MD   Updated: 3/7/2011     ·

## 2022-08-08 ENCOUNTER — OFFICE VISIT (OUTPATIENT)
Dept: FAMILY MEDICINE CLINIC | Age: 67
End: 2022-08-08
Payer: MEDICARE

## 2022-08-08 VITALS
TEMPERATURE: 97.8 F | SYSTOLIC BLOOD PRESSURE: 120 MMHG | HEIGHT: 62 IN | DIASTOLIC BLOOD PRESSURE: 70 MMHG | HEART RATE: 72 BPM | RESPIRATION RATE: 16 BRPM | BODY MASS INDEX: 28.16 KG/M2 | OXYGEN SATURATION: 95 % | WEIGHT: 153 LBS

## 2022-08-08 DIAGNOSIS — E55.9 VITAMIN D DEFICIENCY: ICD-10-CM

## 2022-08-08 DIAGNOSIS — E03.9 HYPOTHYROIDISM, UNSPECIFIED TYPE: Primary | ICD-10-CM

## 2022-08-08 DIAGNOSIS — L30.4 INTERTRIGO: ICD-10-CM

## 2022-08-08 DIAGNOSIS — Z76.0 MEDICATION REFILL: ICD-10-CM

## 2022-08-08 PROCEDURE — 1123F ACP DISCUSS/DSCN MKR DOCD: CPT | Performed by: FAMILY MEDICINE

## 2022-08-08 PROCEDURE — 1090F PRES/ABSN URINE INCON ASSESS: CPT | Performed by: FAMILY MEDICINE

## 2022-08-08 PROCEDURE — 4004F PT TOBACCO SCREEN RCVD TLK: CPT | Performed by: FAMILY MEDICINE

## 2022-08-08 PROCEDURE — 99213 OFFICE O/P EST LOW 20 MIN: CPT | Performed by: FAMILY MEDICINE

## 2022-08-08 PROCEDURE — G8417 CALC BMI ABV UP PARAM F/U: HCPCS | Performed by: FAMILY MEDICINE

## 2022-08-08 PROCEDURE — G8427 DOCREV CUR MEDS BY ELIG CLIN: HCPCS | Performed by: FAMILY MEDICINE

## 2022-08-08 PROCEDURE — 3017F COLORECTAL CA SCREEN DOC REV: CPT | Performed by: FAMILY MEDICINE

## 2022-08-08 PROCEDURE — G8399 PT W/DXA RESULTS DOCUMENT: HCPCS | Performed by: FAMILY MEDICINE

## 2022-08-08 RX ORDER — KETOCONAZOLE 20 MG/G
CREAM TOPICAL
Qty: 30 G | Refills: 0 | Status: SHIPPED | OUTPATIENT
Start: 2022-08-08

## 2022-08-08 RX ORDER — ATORVASTATIN CALCIUM 10 MG/1
TABLET, FILM COATED ORAL
Qty: 90 TABLET | Refills: 1 | Status: SHIPPED | OUTPATIENT
Start: 2022-08-08

## 2022-08-08 ASSESSMENT — PATIENT HEALTH QUESTIONNAIRE - PHQ9
SUM OF ALL RESPONSES TO PHQ QUESTIONS 1-9: 0
1. LITTLE INTEREST OR PLEASURE IN DOING THINGS: 0
SUM OF ALL RESPONSES TO PHQ QUESTIONS 1-9: 0
6. FEELING BAD ABOUT YOURSELF - OR THAT YOU ARE A FAILURE OR HAVE LET YOURSELF OR YOUR FAMILY DOWN: 0
8. MOVING OR SPEAKING SO SLOWLY THAT OTHER PEOPLE COULD HAVE NOTICED. OR THE OPPOSITE, BEING SO FIGETY OR RESTLESS THAT YOU HAVE BEEN MOVING AROUND A LOT MORE THAN USUAL: 0
SUM OF ALL RESPONSES TO PHQ QUESTIONS 1-9: 0
SUM OF ALL RESPONSES TO PHQ QUESTIONS 1-9: 0
2. FEELING DOWN, DEPRESSED OR HOPELESS: 0
SUM OF ALL RESPONSES TO PHQ QUESTIONS 1-9: 0
SUM OF ALL RESPONSES TO PHQ9 QUESTIONS 1 & 2: 0
SUM OF ALL RESPONSES TO PHQ QUESTIONS 1-9: 0
1. LITTLE INTEREST OR PLEASURE IN DOING THINGS: 0
2. FEELING DOWN, DEPRESSED OR HOPELESS: 0
SUM OF ALL RESPONSES TO PHQ QUESTIONS 1-9: 0
SUM OF ALL RESPONSES TO PHQ QUESTIONS 1-9: 0
3. TROUBLE FALLING OR STAYING ASLEEP: 0
SUM OF ALL RESPONSES TO PHQ9 QUESTIONS 1 & 2: 0
10. IF YOU CHECKED OFF ANY PROBLEMS, HOW DIFFICULT HAVE THESE PROBLEMS MADE IT FOR YOU TO DO YOUR WORK, TAKE CARE OF THINGS AT HOME, OR GET ALONG WITH OTHER PEOPLE: 0
4. FEELING TIRED OR HAVING LITTLE ENERGY: 0
7. TROUBLE CONCENTRATING ON THINGS, SUCH AS READING THE NEWSPAPER OR WATCHING TELEVISION: 0
9. THOUGHTS THAT YOU WOULD BE BETTER OFF DEAD, OR OF HURTING YOURSELF: 0
5. POOR APPETITE OR OVEREATING: 0

## 2022-08-08 ASSESSMENT — COLUMBIA-SUICIDE SEVERITY RATING SCALE - C-SSRS
2. HAVE YOU ACTUALLY HAD ANY THOUGHTS OF KILLING YOURSELF?: NO
6. HAVE YOU EVER DONE ANYTHING, STARTED TO DO ANYTHING, OR PREPARED TO DO ANYTHING TO END YOUR LIFE?: NO
1. WITHIN THE PAST MONTH, HAVE YOU WISHED YOU WERE DEAD OR WISHED YOU COULD GO TO SLEEP AND NOT WAKE UP?: NO

## 2022-08-08 ASSESSMENT — ENCOUNTER SYMPTOMS: COUGH: 0

## 2022-08-08 NOTE — PROGRESS NOTES
Subjective:      Patient ID: Johanne Keys is a 79 y.o. female. HPI    Review of Systems  Treatment Adherence:   Medication compliance:  {Desc; compliance:5303::\"compliant most of the time\"}  Diet compliance:  {Desc; compliance:5303::\"compliant most of the time\"}  Weight trend: {INCREASING/DECREASING/STABLE:44056}  Current exercise: {EXERCISE TMJ}  Barriers: {Barriers to success:87742}    Hypertension:  Home blood pressure monitoring: {NO/YES:7057439223}. She {is/is not:9024} adherent to a low sodium diet. Patient {denies/complains:85671} {Symptoms of Hypertension, Denies:55107}. Antihypertensive medication side effects: {Hypertension med side effects:5728::\"no medication side effects noted\"}. Use of agents associated with hypertension: {bp agents assoc with hypertension:511::\"none\"}. Sodium (mEq/L)   Date Value   2022 143    BUN (mg/dL)   Date Value   2022 16    Glucose (mg/dL)   Date Value   2022 93   2012 88      Potassium (mEq/L)   Date Value   2022 5.0 (H)    Creatinine (mg/dL)   Date Value   2022 0.71         Hyperlipidemia:  No new myalgias or GI upset on {RP HYPERLIPIDEMIA MEDS:09989}.      Lab Results   Component Value Date    CHOL 171 2022    TRIG 76 2022    HDL 48 2022    LDLCALC 108 2022     Lab Results   Component Value Date    ALT 14 2022    AST 17 2022          Objective:   Physical Exam    Assessment / Plan:

## 2022-08-08 NOTE — PROGRESS NOTES
Subjective:      Patient ID: Gissell Youssef is a 79 y.o. female    HPI  Here in follow up for vitamin d deficiency and hypothyroidism. No missed doses of medications. Weight down from last time. Has not taken any otc vitamin d for last 3 months. Feels much better overall. Skin lesion looked at from last time no longer present per patient    Has noted rash on chest just under breasts come up over the last 2 months. Itchy at times. Has tried numerous otc creams-still not better. Review of Systems   Constitutional:  Negative for chills and fever. Respiratory:  Negative for cough. Neurological:  Negative for weakness.    Reviewed allergy, medical, social, surgical, family and med list changes and updated   Files--reviewed blood work with slightly low tsh      Social History     Socioeconomic History    Marital status:      Spouse name: None    Number of children: None    Years of education: None    Highest education level: None   Occupational History    Occupation: loading and recieving     Employer: [employer]   Tobacco Use    Smoking status: Every Day     Packs/day: 0.20     Years: 10.00     Pack years: 2.00     Types: Cigarettes    Smokeless tobacco: Never   Vaping Use    Vaping Use: Never used   Substance and Sexual Activity    Alcohol use: Yes     Comment: once weekly    Drug use: No     Social Determinants of Health     Financial Resource Strain: Low Risk     Difficulty of Paying Living Expenses: Not hard at all   Food Insecurity: No Food Insecurity    Worried About Running Out of Food in the Last Year: Never true    Ran Out of Food in the Last Year: Never true   Physical Activity: Sufficiently Active    Days of Exercise per Week: 5 days    Minutes of Exercise per Session: 30 min     Current Outpatient Medications   Medication Sig Dispense Refill    levothyroxine (SYNTHROID) 112 MCG tablet take 1 tablet by mouth once daily 90 tablet 1    sertraline (ZOLOFT) 100 MG tablet 1 and 1/2 daily 135 tablet 1    atorvastatin (LIPITOR) 10 MG tablet take 1 tablet by mouth once daily 90 tablet 1    vitamin E 1000 UNIT capsule Take 1,000 Units by mouth daily. vitamin D (CHOLECALCIFEROL) 400 UNIT TABS tablet Take 400 Units by mouth 3 times daily. No current facility-administered medications for this visit. Family History   Problem Relation Age of Onset    Cancer Father     Colon Cancer Neg Hx      Past Medical History:   Diagnosis Date    Depression     Hyperlipidemia     Hypothyroidism      Objective:   /70   Pulse 72   Temp 97.8 °F (36.6 °C)   Resp 16   Ht 5' 2\" (1.575 m)   Wt 153 lb (69.4 kg)   LMP  (LMP Unknown)   SpO2 95%   BMI 27.98 kg/m²     Physical Exam  Chest:          Comments: Erythema which is flat to slightly raised     Lungs:clear and equal breath sounds. No wheezes or rales. Heart:rate reg. No murmur. No gallops     Gen: In no acute distress  Skin    Assessment:          Diagnosis Orders   1. Hypothyroidism, unspecified type  TSH      2. Vitamin D deficiency  Vitamin D 25 Hydroxy      3. Intertrigo        4. Medication refill               Plan:    Start otc vitamin d again   Orders Placed This Encounter   Medications    atorvastatin (LIPITOR) 10 MG tablet     Sig: take 1 tablet by mouth once daily     Dispense:  90 tablet     Refill:  1    ketoconazole (NIZORAL) 2 % cream     Sig: Apply topically daily x 2 weeks . Dispense:  30 g     Refill:  0    hydrocortisone 2.5 % cream     Sig: Apply topically 2 times daily x 2 weeks .      Dispense:  20 g     Refill:  0    Non fasting blood work at end of November and f/u after done

## 2022-10-10 DIAGNOSIS — E03.9 HYPOTHYROIDISM, UNSPECIFIED TYPE: ICD-10-CM

## 2022-10-10 RX ORDER — LEVOTHYROXINE SODIUM 112 UG/1
TABLET ORAL
Qty: 90 TABLET | Refills: 1 | Status: SHIPPED | OUTPATIENT
Start: 2022-10-10

## 2022-10-10 RX ORDER — SERTRALINE HYDROCHLORIDE 100 MG/1
TABLET, FILM COATED ORAL
Qty: 135 TABLET | Refills: 1 | Status: SHIPPED | OUTPATIENT
Start: 2022-10-10

## 2022-10-10 NOTE — TELEPHONE ENCOUNTER
Comments: This request is coming from the pharmacy. Last Office Visit (last PCP visit):   8/8/2022    Next Visit Date:  Future Appointments   Date Time Provider Parul Nguyen   11/29/2022  2:00 PM Hamilton Lee MD [address] [place], [place] [address]       If hasn't been seen in over a year OR hasn't followed up according to last diabetes/ADHD visit, make appointment for patient before sending refill to provider.     Rx requested:  Requested Prescriptions     Pending Prescriptions Disp Refills    levothyroxine (SYNTHROID) 112 MCG tablet [Pharmacy Med Name: LEVOTHYROXINE 112 MCG TABLET] 90 tablet 1     Sig: take 1 tablet by mouth once daily    sertraline (ZOLOFT) 100 MG tablet [Pharmacy Med Name: SERTRALINE  MG TABLET] 135 tablet 1     Sig: take 1 AND 1/2 tablet by mouth once daily

## 2022-11-25 DIAGNOSIS — E55.9 VITAMIN D DEFICIENCY: ICD-10-CM

## 2022-11-25 DIAGNOSIS — E03.9 HYPOTHYROIDISM, UNSPECIFIED TYPE: ICD-10-CM

## 2022-11-25 LAB
TSH SERPL DL<=0.05 MIU/L-ACNC: 7.13 UIU/ML (ref 0.44–3.86)
VITAMIN D 25-HYDROXY: 20.9 NG/ML

## 2022-11-29 ENCOUNTER — OFFICE VISIT (OUTPATIENT)
Dept: FAMILY MEDICINE CLINIC | Age: 67
End: 2022-11-29
Payer: MEDICARE

## 2022-11-29 VITALS
HEIGHT: 62 IN | TEMPERATURE: 97.5 F | RESPIRATION RATE: 16 BRPM | BODY MASS INDEX: 28.34 KG/M2 | DIASTOLIC BLOOD PRESSURE: 64 MMHG | OXYGEN SATURATION: 95 % | SYSTOLIC BLOOD PRESSURE: 120 MMHG | HEART RATE: 53 BPM | WEIGHT: 154 LBS

## 2022-11-29 DIAGNOSIS — E03.9 HYPOTHYROIDISM, UNSPECIFIED TYPE: ICD-10-CM

## 2022-11-29 DIAGNOSIS — E78.2 MIXED HYPERLIPIDEMIA: ICD-10-CM

## 2022-11-29 DIAGNOSIS — E55.9 VITAMIN D DEFICIENCY: ICD-10-CM

## 2022-11-29 DIAGNOSIS — F33.42 RECURRENT MAJOR DEPRESSIVE DISORDER, IN FULL REMISSION (HCC): Primary | ICD-10-CM

## 2022-11-29 PROCEDURE — 99214 OFFICE O/P EST MOD 30 MIN: CPT | Performed by: FAMILY MEDICINE

## 2022-11-29 PROCEDURE — G8427 DOCREV CUR MEDS BY ELIG CLIN: HCPCS | Performed by: FAMILY MEDICINE

## 2022-11-29 PROCEDURE — G8484 FLU IMMUNIZE NO ADMIN: HCPCS | Performed by: FAMILY MEDICINE

## 2022-11-29 PROCEDURE — G8399 PT W/DXA RESULTS DOCUMENT: HCPCS | Performed by: FAMILY MEDICINE

## 2022-11-29 PROCEDURE — G8417 CALC BMI ABV UP PARAM F/U: HCPCS | Performed by: FAMILY MEDICINE

## 2022-11-29 PROCEDURE — 3017F COLORECTAL CA SCREEN DOC REV: CPT | Performed by: FAMILY MEDICINE

## 2022-11-29 PROCEDURE — 1123F ACP DISCUSS/DSCN MKR DOCD: CPT | Performed by: FAMILY MEDICINE

## 2022-11-29 PROCEDURE — 4004F PT TOBACCO SCREEN RCVD TLK: CPT | Performed by: FAMILY MEDICINE

## 2022-11-29 PROCEDURE — 1090F PRES/ABSN URINE INCON ASSESS: CPT | Performed by: FAMILY MEDICINE

## 2022-11-29 RX ORDER — ERGOCALCIFEROL 1.25 MG/1
50000 CAPSULE ORAL WEEKLY
Qty: 12 CAPSULE | Refills: 0 | Status: SHIPPED | OUTPATIENT
Start: 2022-11-29

## 2022-11-29 RX ORDER — LEVOTHYROXINE SODIUM 0.12 MG/1
125 TABLET ORAL DAILY
Qty: 90 TABLET | Refills: 0 | Status: SHIPPED | OUTPATIENT
Start: 2022-11-29

## 2022-11-29 ASSESSMENT — PATIENT HEALTH QUESTIONNAIRE - PHQ9
1. LITTLE INTEREST OR PLEASURE IN DOING THINGS: 0
SUM OF ALL RESPONSES TO PHQ QUESTIONS 1-9: 0
2. FEELING DOWN, DEPRESSED OR HOPELESS: 0
SUM OF ALL RESPONSES TO PHQ QUESTIONS 1-9: 0
SUM OF ALL RESPONSES TO PHQ9 QUESTIONS 1 & 2: 0

## 2022-11-29 ASSESSMENT — ENCOUNTER SYMPTOMS
DIARRHEA: 0
VOMITING: 0
COUGH: 0

## 2022-11-29 NOTE — PROGRESS NOTES
Subjective:      Patient ID: Margarita Adame is a 79 y.o. female. HPI    Review of Systems  Treatment Adherence:   Medication compliance:  {Desc; compliance:5303::\"compliant most of the time\"}  Diet compliance:  {Desc; compliance:5303::\"compliant most of the time\"}  Weight trend: {INCREASING/DECREASING/STABLE:77486}  Current exercise: {EXERCISE TMVO:454237962}  Barriers: {Barriers to success:01602}    Hypertension:  Home blood pressure monitoring: {NO/YES:3637012601}. She {is/is not:9024} adherent to a low sodium diet. Patient {denies/complains:57344} {Symptoms of Hypertension, Denies:44271}. Antihypertensive medication side effects: {Hypertension med side effects:5728::\"no medication side effects noted\"}. Use of agents associated with hypertension: {bp agents assoc with hypertension:511::\"none\"}. Sodium (mEq/L)   Date Value   03/07/2022 143    BUN (mg/dL)   Date Value   03/07/2022 16    Glucose (mg/dL)   Date Value   03/07/2022 93   04/20/2012 88      Potassium (mEq/L)   Date Value   03/07/2022 5.0 (H)    Creatinine (mg/dL)   Date Value   03/07/2022 0.71         Hyperlipidemia:  No new myalgias or GI upset on {RP HYPERLIPIDEMIA MEDS:41077}.      Lab Results   Component Value Date    CHOL 171 03/07/2022    TRIG 76 03/07/2022    HDL 48 03/07/2022    LDLCALC 108 03/07/2022     Lab Results   Component Value Date    ALT 14 03/07/2022    AST 17 03/07/2022          Objective:   Physical Exam    Assessment / Plan:

## 2022-11-29 NOTE — PROGRESS NOTES
Subjective:      Patient ID: Manolo Roach is a 79 y.o. female    Hyperlipidemia  This is a chronic problem. The current episode started more than 1 year ago. The problem is controlled. Current antihyperlipidemic treatment includes statins. Mental Health Problem  This is a chronic problem. The onset of the illness is precipitated by emotional stress and a stressful event. Here in follow up for hypothyroidism and lipids and depression and blood work. No missed doses of medication. Review of Systems   Constitutional:  Negative for chills and fever. Respiratory:  Negative for cough. Gastrointestinal:  Negative for diarrhea and vomiting. Neurological:  Negative for weakness.    Reviewed allergy, medical, social, surgical, family and med list changes and updated   Files---reviewed blood work with low vitamin d level and elevated tsh      Social History     Socioeconomic History    Marital status:      Spouse name: None    Number of children: None    Years of education: None    Highest education level: None   Occupational History    Occupation: loading and recieving     Employer: [employer]   Tobacco Use    Smoking status: Every Day     Packs/day: 0.20     Years: 10.00     Pack years: 2.00     Types: Cigarettes    Smokeless tobacco: Never   Vaping Use    Vaping Use: Never used   Substance and Sexual Activity    Alcohol use: Yes     Comment: once weekly    Drug use: No     Social Determinants of Health     Financial Resource Strain: Low Risk     Difficulty of Paying Living Expenses: Not hard at all   Food Insecurity: No Food Insecurity    Worried About Running Out of Food in the Last Year: Never true    Ran Out of Food in the Last Year: Never true   Physical Activity: Sufficiently Active    Days of Exercise per Week: 5 days    Minutes of Exercise per Session: 30 min     Current Outpatient Medications   Medication Sig Dispense Refill    levothyroxine (SYNTHROID) 112 MCG tablet take 1 tablet by mouth once daily 90 tablet 1    sertraline (ZOLOFT) 100 MG tablet take 1 AND 1/2 tablet by mouth once daily 135 tablet 1    atorvastatin (LIPITOR) 10 MG tablet take 1 tablet by mouth once daily 90 tablet 1    ketoconazole (NIZORAL) 2 % cream Apply topically daily x 2 weeks . 30 g 0    hydrocortisone 2.5 % cream Apply topically 2 times daily x 2 weeks . 20 g 0    vitamin E 1000 UNIT capsule Take 1,000 Units by mouth daily. vitamin D (CHOLECALCIFEROL) 400 UNIT TABS tablet Take 400 Units by mouth 3 times daily. No current facility-administered medications for this visit. Family History   Problem Relation Age of Onset    Cancer Father     Colon Cancer Neg Hx      Past Medical History:   Diagnosis Date    Depression     Hyperlipidemia     Hypothyroidism      Objective:   /64   Pulse 53   Temp 97.5 °F (36.4 °C)   Resp 16   Ht 5' 2\" (1.575 m)   Wt 154 lb (69.9 kg)   LMP  (LMP Unknown)   SpO2 95%   BMI 28.17 kg/m²     Physical Exam  Neck:no carotid bruits. No masses. No adenopathy. No thyroid asymmetry. Lungs:clear and equal breath sounds. No wheezes or rales. Heart:rate reg. No murmur. No gallops   Pulses:Radials 2+ equal               Poster tib 1+ equal  Extremities:no edema in either leg  Gen: In no acute distress  Abdomen; B.S present. Soft  Non tender. No hepatosplenomegaly. No masses    Patient with appropriate affect. Alert    Thought content appropriate  Good eye contact    Assessment:       Diagnosis Orders   1. Recurrent major depressive disorder, in full remission ([facility] [address]        2. Hypothyroidism, unspecified type        3. Mixed hyperlipidemia        4.  Vitamin D deficiency               Plan:    Continue most meds but increase synthroid and add vitamin d   Orders Placed This Encounter   Medications    vitamin D (ERGOCALCIFEROL) 1.25 MG (19111 UT) CAPS capsule     Sig: Take 1 capsule by mouth once a week     Dispense:  12 capsule     Refill:  0    levothyroxine (SYNTHROID) 125 MCG tablet     Sig: Take 1 tablet by mouth Daily     Dispense:  90 tablet     Refill:  0    Non fasting blood work in 6 weeks and f/u after done

## 2022-12-09 ENCOUNTER — OFFICE VISIT (OUTPATIENT)
Dept: FAMILY MEDICINE CLINIC | Age: 67
End: 2022-12-09

## 2022-12-09 VITALS
RESPIRATION RATE: 20 BRPM | WEIGHT: 154 LBS | BODY MASS INDEX: 28.34 KG/M2 | TEMPERATURE: 97.9 F | OXYGEN SATURATION: 96 % | HEART RATE: 69 BPM | SYSTOLIC BLOOD PRESSURE: 130 MMHG | HEIGHT: 62 IN | DIASTOLIC BLOOD PRESSURE: 84 MMHG

## 2022-12-09 DIAGNOSIS — R06.02 SHORTNESS OF BREATH: ICD-10-CM

## 2022-12-09 DIAGNOSIS — J20.9 ACUTE BRONCHITIS, UNSPECIFIED ORGANISM: Primary | ICD-10-CM

## 2022-12-09 DIAGNOSIS — R05.9 COUGH, UNSPECIFIED TYPE: ICD-10-CM

## 2022-12-09 LAB
INFLUENZA A ANTIBODY: NORMAL
INFLUENZA B ANTIBODY: NORMAL
Lab: NORMAL
PERFORMING INSTRUMENT: NORMAL
QC PASS/FAIL: NORMAL
SARS-COV-2, POC: NORMAL

## 2022-12-09 RX ORDER — ALBUTEROL SULFATE 90 UG/1
2 AEROSOL, METERED RESPIRATORY (INHALATION) EVERY 6 HOURS PRN
Qty: 1 EACH | Refills: 0 | Status: SHIPPED | OUTPATIENT
Start: 2022-12-09

## 2022-12-09 RX ORDER — BENZONATATE 100 MG/1
100 CAPSULE ORAL 3 TIMES DAILY PRN
Qty: 21 CAPSULE | Refills: 0 | Status: SHIPPED | OUTPATIENT
Start: 2022-12-09 | End: 2022-12-16

## 2022-12-09 RX ORDER — AZITHROMYCIN 250 MG/1
TABLET, FILM COATED ORAL
Qty: 1 PACKET | Refills: 0 | Status: SHIPPED | OUTPATIENT
Start: 2022-12-09

## 2022-12-09 RX ORDER — METHYLPREDNISOLONE 4 MG/1
TABLET ORAL
Qty: 1 KIT | Refills: 0 | Status: SHIPPED | OUTPATIENT
Start: 2022-12-09

## 2022-12-09 ASSESSMENT — ENCOUNTER SYMPTOMS
COUGH: 1
CONSTIPATION: 0
CHEST TIGHTNESS: 0
EYE ITCHING: 0
TROUBLE SWALLOWING: 0
WHEEZING: 1
APNEA: 0
HEMOPTYSIS: 0
VOMITING: 0
EYE REDNESS: 0
SINUS PAIN: 0
NAUSEA: 0
ABDOMINAL PAIN: 0
SHORTNESS OF BREATH: 1
RHINORRHEA: 1
SORE THROAT: 0
ABDOMINAL DISTENTION: 0
DIARRHEA: 0

## 2022-12-09 NOTE — PROGRESS NOTES
Subjective:      Patient ID: Sarah Fung is a 79 y.o. female who presents today for:  Chief Complaint   Patient presents with    Cough     Congestion, body aches, head pain and shortness of breath  started 2 days ago   Pt here today and she reports she is covid and flu vaccinated. PT shows or declines any distress. Cough  This is a new problem. The current episode started yesterday (x 2 days). The problem has been unchanged. The cough is Non-productive. Associated symptoms include nasal congestion, postnasal drip, rhinorrhea, shortness of breath and wheezing. Pertinent negatives include no chest pain, chills, ear pain, eye redness, fever, headaches, hemoptysis, myalgias, rash or sore throat. Nothing aggravates the symptoms. Treatments tried: otc meds, sinus meds. The treatment provided mild relief. There is no history of asthma. Shortness of Breath  This is a new problem. The problem occurs intermittently. The problem has been waxing and waning. Associated symptoms include rhinorrhea and wheezing. Pertinent negatives include no abdominal pain, chest pain, ear pain, fever, headaches, hemoptysis, rash, sore throat or vomiting. Nothing aggravates the symptoms. Treatments tried: otc meds. The treatment provided mild relief. There is no history of allergies or asthma. Generalized Body Aches  This is a new problem. The current episode started yesterday. The problem occurs daily. The problem has been unchanged. Associated symptoms include anorexia, congestion, coughing and fatigue. Pertinent negatives include no abdominal pain, arthralgias, chest pain, chills, fever, headaches, myalgias, nausea, rash, sore throat or vomiting. Nothing aggravates the symptoms. She has tried acetaminophen and drinking for the symptoms. The treatment provided mild relief.      Past Medical History:   Diagnosis Date    Depression     Hyperlipidemia     Hypothyroidism      Past Surgical History:   Procedure Laterality Date BACK SURGERY  2012    COLONOSCOPY N/A 9/16/2021    COLORECTAL CANCER SCREENING, NOT HIGH RISK with polypectomy performed by Bernard Hi MD at [address] History    Marital status:      Spouse name: Not on file    Number of children: Not on file    Years of education: Not on file    Highest education level: Not on file   Occupational History    Occupation: loading and recieving     Employer: [employer]   Tobacco Use    Smoking status: Every Day     Packs/day: 0.20     Years: 10.00     Pack years: 2.00     Types: Cigarettes    Smokeless tobacco: Never   Vaping Use    Vaping Use: Never used   Substance and Sexual Activity    Alcohol use: Yes     Comment: once weekly    Drug use: No    Sexual activity: Not on file   Other Topics Concern    Not on file   Social History Narrative    Not on file     Social Determinants of Health     Financial Resource Strain: Low Risk     Difficulty of Paying Living Expenses: Not hard at all   Food Insecurity: No Food Insecurity    Worried About Running Out of Food in the Last Year: Never true    Ran Out of Food in the Last Year: Never true   Transportation Needs: Not on file   Physical Activity: Sufficiently Active    Days of Exercise per Week: 5 days    Minutes of Exercise per Session: 30 min   Stress: Not on file   Social Connections: Not on file   Intimate Partner Violence: Not on file   Housing Stability: Not on file     Family History   Problem Relation Age of Onset    Cancer Father     Colon Cancer Neg Hx      Allergies   Allergen Reactions    Pcn [Penicillins] Hives    Tramadol Itching     Itch and nightmares    Hydrocodone-Acetaminophen Itching    Vicodin [Hydrocodone-Acetaminophen] Itching         Review of Systems   Constitutional:  Positive for fatigue. Negative for activity change, appetite change, chills and fever. HENT:  Positive for congestion, postnasal drip and rhinorrhea.  Negative for drooling, ear pain, hearing loss, sinus pain, sore throat and trouble swallowing. Eyes:  Negative for redness, itching and visual disturbance. Respiratory:  Positive for cough, shortness of breath and wheezing. Negative for apnea, hemoptysis and chest tightness. Cardiovascular:  Negative for chest pain and palpitations. Gastrointestinal:  Positive for anorexia. Negative for abdominal distention, abdominal pain, constipation, diarrhea, nausea and vomiting. Endocrine: Negative for heat intolerance. Genitourinary:  Negative for difficulty urinating, flank pain, genital sores, urgency and vaginal pain. Musculoskeletal:  Negative for arthralgias, gait problem, myalgias and neck stiffness. Skin:  Negative for rash. Neurological:  Negative for tremors, seizures, facial asymmetry and headaches. Hematological:  Negative for adenopathy. Psychiatric/Behavioral:  Negative for behavioral problems, confusion and suicidal ideas. The patient is not hyperactive. All other systems reviewed and are negative. Objective:   /84 (Site: Right Upper Arm, Position: Sitting, Cuff Size: Medium Adult)   Pulse 69   Temp 97.9 °F (36.6 °C) (Temporal)   Resp 20   Ht 5' 2\" (1.575 m)   Wt 154 lb (69.9 kg)   LMP  (LMP Unknown)   SpO2 96%   BMI 28.17 kg/m²     Physical Exam  Vitals and nursing note reviewed. Constitutional:       General: She is awake. She is not in acute distress. Appearance: Normal appearance. She is well-developed and well-groomed. She is obese. She is ill-appearing. She is not toxic-appearing or diaphoretic. HENT:      Head: Normocephalic and atraumatic. Right Ear: Tympanic membrane normal.      Left Ear: Tympanic membrane normal.      Nose: Rhinorrhea present. Mouth/Throat:      Mouth: Mucous membranes are moist.      Pharynx: Posterior oropharyngeal erythema present. Eyes:      Extraocular Movements: Extraocular movements intact.       Conjunctiva/sclera: Conjunctivae normal.      Pupils: Pupils are equal, round, and reactive to light. Cardiovascular:      Rate and Rhythm: Normal rate and regular rhythm. Pulses: Normal pulses. Heart sounds: Normal heart sounds. No murmur heard. Pulmonary:      Effort: Pulmonary effort is normal. No tachypnea, bradypnea or respiratory distress. Breath sounds: No decreased air movement. Examination of the right-middle field reveals wheezing. Examination of the left-middle field reveals wheezing. Wheezing present. No rhonchi. Chest:      Chest wall: No tenderness. Abdominal:      General: Abdomen is flat. Bowel sounds are normal. There is no distension. Palpations: Abdomen is soft. Tenderness: There is no abdominal tenderness. There is no left CVA tenderness, guarding or rebound. Musculoskeletal:         General: No deformity or signs of injury. Normal range of motion. Cervical back: Normal range of motion and neck supple. Lymphadenopathy:      Cervical: No cervical adenopathy. Skin:     General: Skin is warm and dry. Capillary Refill: Capillary refill takes less than 2 seconds. Findings: No bruising or erythema. Neurological:      General: No focal deficit present. Mental Status: She is alert and oriented to person, place, and time. Mental status is at baseline. Motor: No weakness. Coordination: Coordination normal.   Psychiatric:         Attention and Perception: Attention and perception normal.         Mood and Affect: Mood and affect normal.         Speech: Speech normal.         Behavior: Behavior normal. Behavior is cooperative. Thought Content: Thought content normal.         Judgment: Judgment normal.       Assessment:       Diagnosis Orders   1. Acute bronchitis, unspecified organism  azithromycin (ZITHROMAX) 250 MG tablet      2.  Shortness of breath  albuterol sulfate HFA (PROVENTIL;VENTOLIN;PROAIR) 108 (90 Base) MCG/ACT inhaler    XR CHEST STANDARD (2 VW)    methylPREDNISolone (MEDROL, ASHWIN,) 4 MG tablet      3. Cough, unspecified type  benzonatate (TESSALON) 100 MG capsule            Plan:      Orders Placed This Encounter   Procedures    XR CHEST STANDARD (2 VW)     Standing Status:   Future     Standing Expiration Date:   12/9/2023     Order Specific Question:   Reason for exam:     Answer:   r/o pneumonia    POCT COVID-19, Antigen     Order Specific Question:   Pregnant? Answer:   No    POCT Influenza A/B     Orders Placed This Encounter   Medications    albuterol sulfate HFA (PROVENTIL;VENTOLIN;PROAIR) 108 (90 Base) MCG/ACT inhaler     Sig: Inhale 2 puffs into the lungs every 6 hours as needed for Wheezing     Dispense:  1 each     Refill:  0    azithromycin (ZITHROMAX) 250 MG tablet     Sig: Take 2 tabs (500 mg) on Day 1, and take 1 tab (250 mg) on days 2 through 5. Dispense:  1 packet     Refill:  0    methylPREDNISolone (MEDROL, ASHWIN,) 4 MG tablet     Sig: Take by mouth. Dispense:  1 kit     Refill:  0    benzonatate (TESSALON) 100 MG capsule     Sig: Take 1 capsule by mouth 3 times daily as needed for Cough     Dispense:  21 capsule     Refill:  0     Pt here today and she was tested for flu and covid. Pt aware she is neg for both. Pt declines or shows distress but she has cough and congestion. Pt advised to increase her fluids, rest and if her s/s worsen and increased SOB, chest pain or drooling to go to the ER. Pt aware. Antibiotic Instructions: Complete the full course of antibiotics as ordered. Take each dose with a small snack or meal to lessen potential GI upset. To prevent antibiotic resistance, please take medication as ordered and for the full duration even if you start to feel better. Consider intake of yogurt or probiotic during antibiotic use and for a few days after to help reduce the risk of developing a secondary infection. Separate the yogurt and antibiotic by at least 1 hour. Avoid alcohol while taking antibiotics. Oral Steroid Instructions:  Take each dose with a small snack or meal to lessen potential GI upset. Follow dosing instructions provided with prescription. Common side effects include difficulty sleeping and irritability. Take full course as ordered. Pt left  the [facility] today in stable condition. Discussed signs and symptoms which require immediate follow-up in ED/call to 911. Patient verbalized understanding. No follow-ups on file. Reviewed with the patient: current clinical status, medications, activities and diet. Side effects, adverse effects of the medication prescribed today, as well as treatment plan and result expectations have been discussed with the patient who expresses understanding and desires to proceed. Close follow up to evaluate treatment results and for coordination of care. I have reviewed the patient's medical history in detail and updated the computerized patient record.       Peace Lynn, APRN - CNP

## 2022-12-26 ENCOUNTER — OFFICE VISIT (OUTPATIENT)
Dept: FAMILY MEDICINE CLINIC | Age: 67
End: 2022-12-26
Payer: MEDICARE

## 2022-12-26 VITALS
HEART RATE: 71 BPM | BODY MASS INDEX: 28.34 KG/M2 | WEIGHT: 154 LBS | HEIGHT: 62 IN | RESPIRATION RATE: 18 BRPM | SYSTOLIC BLOOD PRESSURE: 136 MMHG | DIASTOLIC BLOOD PRESSURE: 72 MMHG | OXYGEN SATURATION: 99 % | TEMPERATURE: 98.1 F

## 2022-12-26 DIAGNOSIS — J01.40 ACUTE NON-RECURRENT PANSINUSITIS: Primary | ICD-10-CM

## 2022-12-26 DIAGNOSIS — J06.9 URI WITH COUGH AND CONGESTION: ICD-10-CM

## 2022-12-26 LAB
INFLUENZA A ANTIBODY: NEGATIVE
INFLUENZA B ANTIBODY: NEGATIVE
Lab: NORMAL
PERFORMING INSTRUMENT: NORMAL
QC PASS/FAIL: NORMAL
SARS-COV-2, POC: NORMAL

## 2022-12-26 PROCEDURE — 3017F COLORECTAL CA SCREEN DOC REV: CPT | Performed by: NURSE PRACTITIONER

## 2022-12-26 PROCEDURE — G8399 PT W/DXA RESULTS DOCUMENT: HCPCS | Performed by: NURSE PRACTITIONER

## 2022-12-26 PROCEDURE — G8484 FLU IMMUNIZE NO ADMIN: HCPCS | Performed by: NURSE PRACTITIONER

## 2022-12-26 PROCEDURE — G8417 CALC BMI ABV UP PARAM F/U: HCPCS | Performed by: NURSE PRACTITIONER

## 2022-12-26 PROCEDURE — G8427 DOCREV CUR MEDS BY ELIG CLIN: HCPCS | Performed by: NURSE PRACTITIONER

## 2022-12-26 PROCEDURE — 4004F PT TOBACCO SCREEN RCVD TLK: CPT | Performed by: NURSE PRACTITIONER

## 2022-12-26 PROCEDURE — 87426 SARSCOV CORONAVIRUS AG IA: CPT | Performed by: NURSE PRACTITIONER

## 2022-12-26 PROCEDURE — 99213 OFFICE O/P EST LOW 20 MIN: CPT | Performed by: NURSE PRACTITIONER

## 2022-12-26 PROCEDURE — 1090F PRES/ABSN URINE INCON ASSESS: CPT | Performed by: NURSE PRACTITIONER

## 2022-12-26 PROCEDURE — 87804 INFLUENZA ASSAY W/OPTIC: CPT | Performed by: NURSE PRACTITIONER

## 2022-12-26 PROCEDURE — 1123F ACP DISCUSS/DSCN MKR DOCD: CPT | Performed by: NURSE PRACTITIONER

## 2022-12-26 RX ORDER — FLUTICASONE PROPIONATE 50 MCG
1 SPRAY, SUSPENSION (ML) NASAL DAILY
Qty: 32 G | Refills: 1 | Status: SHIPPED | OUTPATIENT
Start: 2022-12-26

## 2022-12-26 RX ORDER — CEFDINIR 300 MG/1
300 CAPSULE ORAL 2 TIMES DAILY
Qty: 20 CAPSULE | Refills: 0 | Status: SHIPPED | OUTPATIENT
Start: 2022-12-26 | End: 2023-01-05

## 2022-12-26 RX ORDER — METHYLPREDNISOLONE 4 MG/1
TABLET ORAL
Qty: 1 KIT | Refills: 0 | Status: SHIPPED | OUTPATIENT
Start: 2022-12-26 | End: 2023-01-01

## 2022-12-26 RX ORDER — LEVOFLOXACIN 500 MG/1
500 TABLET, FILM COATED ORAL DAILY
Qty: 10 TABLET | Refills: 0 | Status: SHIPPED | OUTPATIENT
Start: 2022-12-26 | End: 2023-01-05

## 2022-12-26 ASSESSMENT — ENCOUNTER SYMPTOMS
SINUS PRESSURE: 1
SHORTNESS OF BREATH: 0
SORE THROAT: 0
DIARRHEA: 0
SINUS PAIN: 1
COUGH: 1
WHEEZING: 0
NAUSEA: 0
RHINORRHEA: 0
VOMITING: 0

## 2022-12-27 ENCOUNTER — TELEPHONE (OUTPATIENT)
Dept: FAMILY MEDICINE CLINIC | Age: 67
End: 2022-12-27

## 2022-12-27 NOTE — TELEPHONE ENCOUNTER
Patient states [address] informed her they are currently out of the Cefdinir that was prescribed yesterday. She is inquiring if an alternate medication can be prescribed. Please advise. Thank you.

## 2023-02-17 RX ORDER — ATORVASTATIN CALCIUM 10 MG/1
TABLET, FILM COATED ORAL
Qty: 90 TABLET | Refills: 1 | Status: SHIPPED | OUTPATIENT
Start: 2023-02-17

## 2023-02-24 RX ORDER — ERGOCALCIFEROL 1.25 MG/1
CAPSULE ORAL
Qty: 12 CAPSULE | Refills: 0 | Status: SHIPPED | OUTPATIENT
Start: 2023-02-24

## 2023-03-02 RX ORDER — LEVOTHYROXINE SODIUM 0.12 MG/1
TABLET ORAL
Qty: 90 TABLET | Refills: 0 | Status: SHIPPED | OUTPATIENT
Start: 2023-03-02 | End: 2023-04-11

## 2023-03-22 DIAGNOSIS — E55.9 VITAMIN D DEFICIENCY: ICD-10-CM

## 2023-03-22 DIAGNOSIS — E03.9 HYPOTHYROIDISM, UNSPECIFIED TYPE: ICD-10-CM

## 2023-03-22 LAB — TSH SERPL-MCNC: 22.24 UIU/ML (ref 0.44–3.86)

## 2023-03-23 LAB — VITAMIN D 25-HYDROXY: 31.6 NG/ML

## 2023-05-22 RX ORDER — ERGOCALCIFEROL 1.25 MG/1
CAPSULE ORAL
Qty: 12 CAPSULE | Refills: 0 | OUTPATIENT
Start: 2023-05-22

## 2023-07-08 RX ORDER — LEVOTHYROXINE SODIUM 0.15 MG/1
TABLET ORAL
Qty: 90 TABLET | Refills: 0 | Status: SHIPPED | OUTPATIENT
Start: 2023-07-08

## 2023-07-08 NOTE — TELEPHONE ENCOUNTER
Rx requested:  Requested Prescriptions     Pending Prescriptions Disp Refills    levothyroxine (SYNTHROID) 150 MCG tablet [Pharmacy Med Name: LEVOTHYROXINE 150 MCG TABLET] 90 tablet 0     Sig: take 1 tablet by mouth once daily         Last Office Visit:   4/11/2023      Next Visit Date:  Future Appointments   Date Time Provider [address]   7/11/2023  1:30 PM Umu Clemens MD [address] [place] [address]

## 2023-07-19 DIAGNOSIS — E55.9 VITAMIN D DEFICIENCY: ICD-10-CM

## 2023-07-19 DIAGNOSIS — E03.9 HYPOTHYROIDISM, UNSPECIFIED TYPE: ICD-10-CM

## 2023-07-19 LAB
ALBUMIN SERPL-MCNC: 4.3 G/DL (ref 3.5–4.6)
ALP SERPL-CCNC: 66 U/L (ref 40–130)
ALT SERPL-CCNC: 12 U/L (ref 0–33)
ANION GAP SERPL CALCULATED.3IONS-SCNC: 11 MEQ/L (ref 9–15)
AST SERPL-CCNC: 14 U/L (ref 0–35)
BASOPHILS # BLD: 0 K/UL (ref 0–0.2)
BASOPHILS NFR BLD: 0.4 %
BILIRUB SERPL-MCNC: 0.4 MG/DL (ref 0.2–0.7)
BUN SERPL-MCNC: 16 MG/DL (ref 8–23)
CALCIUM SERPL-MCNC: 9.5 MG/DL (ref 8.5–9.9)
CHLORIDE SERPL-SCNC: 109 MEQ/L (ref 95–107)
CHOLEST SERPL-MCNC: 157 MG/DL (ref 0–199)
CO2 SERPL-SCNC: 23 MEQ/L (ref 20–31)
CREAT SERPL-MCNC: 0.65 MG/DL (ref 0.5–0.9)
EOSINOPHIL # BLD: 0.1 K/UL (ref 0–0.7)
EOSINOPHIL NFR BLD: 0.9 %
ERYTHROCYTE [DISTWIDTH] IN BLOOD BY AUTOMATED COUNT: 12.6 % (ref 11.5–14.5)
GLOBULIN SER CALC-MCNC: 2.2 G/DL (ref 2.3–3.5)
GLUCOSE SERPL-MCNC: 94 MG/DL (ref 70–99)
HCT VFR BLD AUTO: 42.5 % (ref 37–47)
HDLC SERPL-MCNC: 46 MG/DL (ref 40–59)
HGB BLD-MCNC: 14.5 G/DL (ref 12–16)
LDLC SERPL CALC-MCNC: 96 MG/DL (ref 0–129)
LYMPHOCYTES # BLD: 2.4 K/UL (ref 1–4.8)
LYMPHOCYTES NFR BLD: 33.9 %
MCH RBC QN AUTO: 32.7 PG (ref 27–31.3)
MCHC RBC AUTO-ENTMCNC: 34.1 % (ref 33–37)
MCV RBC AUTO: 95.9 FL (ref 79.4–94.8)
MONOCYTES # BLD: 0.4 K/UL (ref 0.2–0.8)
MONOCYTES NFR BLD: 5.7 %
NEUTROPHILS # BLD: 4.2 K/UL (ref 1.4–6.5)
NEUTS SEG NFR BLD: 59.1 %
PLATELET # BLD AUTO: 238 K/UL (ref 130–400)
POTASSIUM SERPL-SCNC: 5.3 MEQ/L (ref 3.4–4.9)
PROT SERPL-MCNC: 6.5 G/DL (ref 6.3–8)
RBC # BLD AUTO: 4.44 M/UL (ref 4.2–5.4)
SODIUM SERPL-SCNC: 143 MEQ/L (ref 135–144)
TRIGL SERPL-MCNC: 73 MG/DL (ref 0–150)
TSH SERPL-MCNC: 2.39 UIU/ML (ref 0.44–3.86)
WBC # BLD AUTO: 7.1 K/UL (ref 4.8–10.8)

## 2023-07-20 LAB — VITAMIN D 25-HYDROXY: 25.8 NG/ML

## 2023-08-24 RX ORDER — ATORVASTATIN CALCIUM 10 MG/1
TABLET, FILM COATED ORAL
Qty: 90 TABLET | Refills: 1 | Status: SHIPPED | OUTPATIENT
Start: 2023-08-24

## 2023-08-24 NOTE — TELEPHONE ENCOUNTER
Pharmacy requesting medication refill.  Please approve or deny this request.    Rx requested:  Requested Prescriptions     Pending Prescriptions Disp Refills    atorvastatin (LIPITOR) 10 MG tablet [Pharmacy Med Name: ATORVASTATIN 10 MG TABLET] 90 tablet 1     Sig: take 1 tablet by mouth once daily         Last Office Visit:   4/11/2023      Next Visit Date:  Future Appointments   Date Time Provider [address]   9/1/2023  2:00 PM Young Matthews MD [address] [facility]

## 2023-09-06 ENCOUNTER — OFFICE VISIT (OUTPATIENT)
Dept: FAMILY MEDICINE CLINIC | Age: 68
End: 2023-09-06
Payer: MEDICARE

## 2023-09-06 VITALS
RESPIRATION RATE: 16 BRPM | SYSTOLIC BLOOD PRESSURE: 110 MMHG | HEART RATE: 65 BPM | TEMPERATURE: 98 F | WEIGHT: 155 LBS | HEIGHT: 62 IN | OXYGEN SATURATION: 98 % | BODY MASS INDEX: 28.52 KG/M2 | DIASTOLIC BLOOD PRESSURE: 80 MMHG

## 2023-09-06 DIAGNOSIS — E78.2 MIXED HYPERLIPIDEMIA: ICD-10-CM

## 2023-09-06 DIAGNOSIS — Z12.31 ENCOUNTER FOR SCREENING MAMMOGRAM FOR MALIGNANT NEOPLASM OF BREAST: ICD-10-CM

## 2023-09-06 DIAGNOSIS — E87.5 HYPERKALEMIA: ICD-10-CM

## 2023-09-06 DIAGNOSIS — F33.42 RECURRENT MAJOR DEPRESSIVE DISORDER, IN FULL REMISSION (HCC): ICD-10-CM

## 2023-09-06 DIAGNOSIS — E55.9 VITAMIN D DEFICIENCY: ICD-10-CM

## 2023-09-06 DIAGNOSIS — E03.9 HYPOTHYROIDISM, UNSPECIFIED TYPE: Primary | ICD-10-CM

## 2023-09-06 PROCEDURE — 99214 OFFICE O/P EST MOD 30 MIN: CPT | Performed by: FAMILY MEDICINE

## 2023-09-06 PROCEDURE — G8427 DOCREV CUR MEDS BY ELIG CLIN: HCPCS | Performed by: FAMILY MEDICINE

## 2023-09-06 PROCEDURE — 1123F ACP DISCUSS/DSCN MKR DOCD: CPT | Performed by: FAMILY MEDICINE

## 2023-09-06 PROCEDURE — 1090F PRES/ABSN URINE INCON ASSESS: CPT | Performed by: FAMILY MEDICINE

## 2023-09-06 PROCEDURE — G8417 CALC BMI ABV UP PARAM F/U: HCPCS | Performed by: FAMILY MEDICINE

## 2023-09-06 PROCEDURE — 3017F COLORECTAL CA SCREEN DOC REV: CPT | Performed by: FAMILY MEDICINE

## 2023-09-06 PROCEDURE — 4004F PT TOBACCO SCREEN RCVD TLK: CPT | Performed by: FAMILY MEDICINE

## 2023-09-06 PROCEDURE — G8399 PT W/DXA RESULTS DOCUMENT: HCPCS | Performed by: FAMILY MEDICINE

## 2023-09-06 RX ORDER — ERGOCALCIFEROL 1.25 MG/1
50000 CAPSULE ORAL WEEKLY
Qty: 12 CAPSULE | Refills: 0 | Status: SHIPPED | OUTPATIENT
Start: 2023-09-06

## 2023-09-06 ASSESSMENT — PATIENT HEALTH QUESTIONNAIRE - PHQ9
SUM OF ALL RESPONSES TO PHQ QUESTIONS 1-9: 2
SUM OF ALL RESPONSES TO PHQ9 QUESTIONS 1 & 2: 2
1. LITTLE INTEREST OR PLEASURE IN DOING THINGS: 1
2. FEELING DOWN, DEPRESSED OR HOPELESS: 1

## 2023-09-06 ASSESSMENT — ENCOUNTER SYMPTOMS
SHORTNESS OF BREATH: 0
COUGH: 0
VOMITING: 0
DIARRHEA: 0

## 2023-09-06 NOTE — PROGRESS NOTES
in full remission ([address])        3. Mixed hyperlipidemia        4. Vitamin D deficiency        5.  Hyperkalemia               Plan:      Orders Placed This Encounter   Medications    vitamin D (ERGOCALCIFEROL) 1.25 MG (10084 UT) CAPS capsule     Sig: Take 1 capsule by mouth once a week     Dispense:  12 capsule     Refill:  0      Orders Placed This Encounter   Procedures    FELIX DIGITAL SCREEN W OR WO CAD BILATERAL     Standing Status:   Future     Standing Expiration Date:   11/6/2024    Continue current dose of synthroid for thyroid disorder   Continue zoloft for depression and continue lipitor for lipids   Non fasting blood work in 3 months and f/u after done   Supplement vitamin d

## 2023-10-11 RX ORDER — LEVOTHYROXINE SODIUM 0.15 MG/1
TABLET ORAL
Qty: 90 TABLET | Refills: 0 | Status: SHIPPED | OUTPATIENT
Start: 2023-10-11

## 2023-10-11 NOTE — TELEPHONE ENCOUNTER
Future Appointments    Encounter Information    Provider Department Appt Notes   12/6/2023 Kirby Vickers MD [facility] 3 month follow up     Past Visits    Date Provider Specialty Visit Type Primary Dx   09/06/2023 Kirby Vickers MD Family Medicine Office Visit Hypothyroidism, unspecified

## 2023-10-13 ENCOUNTER — TELEPHONE (OUTPATIENT)
Dept: PHARMACY | Facility: CLINIC | Age: 68
End: 2023-10-13

## 2023-10-13 NOTE — TELEPHONE ENCOUNTER
POPULATION HEALTH CLINICAL PHARMACY: ADHERENCE REVIEW  Identified care gap per [employer]: fills at [facility]: Statin adherence    ASSESSMENT   [address] Records claims through 10/09/2023 (Prior Year [address] = not reported; YTD [address] = 77%; Potential Fail Date: 10/17/23):   ATORVASTATIN TAB 10MG last filled on 23 for 90 day supply. Next refill due: 23    Prescribed si tablet/capsule daily    Per Reconcile Dispense History: last filled on 23 for 90 day supply. Per [facility]: last picked up on 23 for 90 day supply. will get 90 day supply ready to  since past due. Billed through [place] ([facility]). Lab Results   Component Value Date    CHOL 157 2023    TRIG 73 2023    HDL 46 2023    LDLCALC 96 2023     ALT   Date Value Ref Range Status   2023 12 0 - 33 U/L Final     AST   Date Value Ref Range Status   2023 14 0 - 35 U/L Final     The 10-year ASCVD risk score (Rhina GUTIERREZ, et al., 2019) is: 9.3%    Values used to calculate the score:      Age: 76 years      Sex: Female      Is Non- : No      Diabetic: No      Tobacco smoker: Yes      Systolic Blood Pressure: 712 mmHg      Is BP treated: No      HDL Cholesterol: 46 mg/dL      Total Cholesterol: 157 mg/dL     PLAN    The following are interventions that have been identified:   Patient overdue refilling Atorvastatin and active on home medication list.   Refill/s of Atorvastatin READY to  at patient's [address] [facility]    Attempting to reach patient to review. Left message asking for return call. Letter sent to patient.       Last Visit: 23  Next Visit: 23        Sarah Ochoa, [address]   Direct: [phone]  Phone: toll free [phone]       For Pharmacy Admin Tracking Only    Program: Evan in place:  No  Recommendation Provided To: Pharmacy: 1  Intervention Detail:

## 2023-10-18 ENCOUNTER — TELEPHONE (OUTPATIENT)
Dept: FAMILY MEDICINE CLINIC | Age: 68
End: 2023-10-18

## 2023-10-30 ENCOUNTER — APPOINTMENT (OUTPATIENT)
Dept: CT IMAGING | Age: 68
End: 2023-10-30
Payer: MEDICARE

## 2023-10-30 ENCOUNTER — HOSPITAL ENCOUNTER (EMERGENCY)
Age: 68
Discharge: HOME | End: 2023-10-31
Payer: MEDICARE

## 2023-10-30 VITALS
DIASTOLIC BLOOD PRESSURE: 83 MMHG | BODY MASS INDEX: 25.61 KG/M2 | OXYGEN SATURATION: 98 % | SYSTOLIC BLOOD PRESSURE: 180 MMHG | HEART RATE: 58 BPM | RESPIRATION RATE: 20 BRPM | WEIGHT: 140 LBS | TEMPERATURE: 98.5 F

## 2023-10-30 DIAGNOSIS — R11.2 NAUSEA AND VOMITING, UNSPECIFIED VOMITING TYPE: ICD-10-CM

## 2023-10-30 DIAGNOSIS — N20.0 KIDNEY STONE: Primary | ICD-10-CM

## 2023-10-30 LAB
ALBUMIN SERPL-MCNC: 4.3 G/DL (ref 3.5–4.6)
ALP SERPL-CCNC: 75 U/L (ref 40–130)
ALT SERPL-CCNC: 23 U/L (ref 0–33)
ANION GAP SERPL CALCULATED.3IONS-SCNC: 12 MEQ/L (ref 9–15)
AST SERPL-CCNC: 19 U/L (ref 0–35)
BASOPHILS # BLD: 0 K/UL (ref 0–0.2)
BASOPHILS NFR BLD: 0.3 %
BILIRUB SERPL-MCNC: 0.3 MG/DL (ref 0.2–0.7)
BUN SERPL-MCNC: 19 MG/DL (ref 8–23)
CALCIUM SERPL-MCNC: 9.5 MG/DL (ref 8.5–9.9)
CHLORIDE SERPL-SCNC: 109 MEQ/L (ref 95–107)
CO2 SERPL-SCNC: 22 MEQ/L (ref 20–31)
CREAT SERPL-MCNC: 0.89 MG/DL (ref 0.5–0.9)
EOSINOPHIL # BLD: 0 K/UL (ref 0–0.7)
EOSINOPHIL NFR BLD: 0.2 %
ERYTHROCYTE [DISTWIDTH] IN BLOOD BY AUTOMATED COUNT: 12.9 % (ref 11.5–14.5)
GLOBULIN SER CALC-MCNC: 2.5 G/DL (ref 2.3–3.5)
GLUCOSE SERPL-MCNC: 113 MG/DL (ref 70–99)
HCT VFR BLD AUTO: 42.4 % (ref 37–47)
HGB BLD-MCNC: 14.1 G/DL (ref 12–16)
LIPASE SERPL-CCNC: 29 U/L (ref 12–95)
LYMPHOCYTES # BLD: 1.5 K/UL (ref 1–4.8)
LYMPHOCYTES NFR BLD: 12.9 %
MCH RBC QN AUTO: 32.3 PG (ref 27–31.3)
MCHC RBC AUTO-ENTMCNC: 33.3 % (ref 33–37)
MCV RBC AUTO: 97.2 FL (ref 79.4–94.8)
MONOCYTES # BLD: 0.6 K/UL (ref 0.2–0.8)
MONOCYTES NFR BLD: 4.9 %
NEUTROPHILS # BLD: 9.4 K/UL (ref 1.4–6.5)
NEUTS SEG NFR BLD: 81.3 %
PLATELET # BLD AUTO: 248 K/UL (ref 130–400)
POTASSIUM SERPL-SCNC: 4.4 MEQ/L (ref 3.4–4.9)
PROT SERPL-MCNC: 6.8 G/DL (ref 6.3–8)
RBC # BLD AUTO: 4.36 M/UL (ref 4.2–5.4)
SODIUM SERPL-SCNC: 143 MEQ/L (ref 135–144)
WBC # BLD AUTO: 11.6 K/UL (ref 4.8–10.8)

## 2023-10-30 PROCEDURE — 96374 THER/PROPH/DIAG INJ IV PUSH: CPT

## 2023-10-30 PROCEDURE — 36415 COLL VENOUS BLD VENIPUNCTURE: CPT

## 2023-10-30 PROCEDURE — 99285 EMERGENCY DEPT VISIT HI MDM: CPT

## 2023-10-30 PROCEDURE — 6360000002 HC RX W HCPCS: Performed by: PHYSICIAN ASSISTANT

## 2023-10-30 PROCEDURE — 74177 CT ABD & PELVIS W/CONTRAST: CPT

## 2023-10-30 PROCEDURE — 96375 TX/PRO/DX INJ NEW DRUG ADDON: CPT

## 2023-10-30 PROCEDURE — 85025 COMPLETE CBC W/AUTO DIFF WBC: CPT

## 2023-10-30 PROCEDURE — 6360000004 HC RX CONTRAST MEDICATION: Performed by: PHYSICIAN ASSISTANT

## 2023-10-30 PROCEDURE — 81001 URINALYSIS AUTO W/SCOPE: CPT

## 2023-10-30 PROCEDURE — 80053 COMPREHEN METABOLIC PANEL: CPT

## 2023-10-30 PROCEDURE — 83690 ASSAY OF LIPASE: CPT

## 2023-10-30 RX ORDER — MORPHINE SULFATE 4 MG/ML
4 INJECTION, SOLUTION INTRAMUSCULAR; INTRAVENOUS ONCE
Status: COMPLETED | OUTPATIENT
Start: 2023-10-30 | End: 2023-10-30

## 2023-10-30 RX ORDER — ONDANSETRON 2 MG/ML
4 INJECTION INTRAMUSCULAR; INTRAVENOUS ONCE
Status: COMPLETED | OUTPATIENT
Start: 2023-10-30 | End: 2023-10-30

## 2023-10-30 RX ADMIN — ONDANSETRON 4 MG: 2 INJECTION INTRAMUSCULAR; INTRAVENOUS at 22:41

## 2023-10-30 RX ADMIN — IOPAMIDOL 50 ML: 612 INJECTION, SOLUTION INTRAVENOUS at 23:50

## 2023-10-30 RX ADMIN — MORPHINE SULFATE 4 MG: 4 INJECTION, SOLUTION INTRAMUSCULAR; INTRAVENOUS at 22:41

## 2023-10-30 ASSESSMENT — ENCOUNTER SYMPTOMS
APNEA: 0
VOICE CHANGE: 0
ABDOMINAL PAIN: 1
BACK PAIN: 1
ABDOMINAL DISTENTION: 0
ANAL BLEEDING: 0
NAUSEA: 1
VOMITING: 1
EYE DISCHARGE: 0

## 2023-10-30 ASSESSMENT — PAIN DESCRIPTION - ORIENTATION: ORIENTATION: LEFT

## 2023-10-30 ASSESSMENT — PAIN DESCRIPTION - DESCRIPTORS: DESCRIPTORS: SHARP

## 2023-10-30 ASSESSMENT — PAIN DESCRIPTION - LOCATION
LOCATION: FLANK
LOCATION: ABDOMEN

## 2023-10-30 ASSESSMENT — PAIN SCALES - GENERAL
PAINLEVEL_OUTOF10: 8
PAINLEVEL_OUTOF10: 10

## 2023-10-30 ASSESSMENT — PAIN DESCRIPTION - ONSET: ONSET: ON-GOING

## 2023-10-30 ASSESSMENT — PAIN DESCRIPTION - FREQUENCY: FREQUENCY: CONTINUOUS

## 2023-10-31 LAB
BACTERIA URNS QL MICRO: NEGATIVE /HPF
BILIRUB UR QL STRIP: NEGATIVE
CLARITY UR: CLEAR
COLOR UR: ABNORMAL
EPI CELLS #/AREA URNS AUTO: ABNORMAL /HPF (ref 0–5)
GLUCOSE UR STRIP-MCNC: NEGATIVE MG/DL
HGB UR QL STRIP: ABNORMAL
HYALINE CASTS #/AREA URNS AUTO: ABNORMAL /HPF (ref 0–5)
KETONES UR STRIP-MCNC: NEGATIVE MG/DL
LEUKOCYTE ESTERASE UR QL STRIP: ABNORMAL
NITRITE UR QL STRIP: NEGATIVE
PH UR STRIP: 6 [PH] (ref 5–9)
PROT UR STRIP-MCNC: ABNORMAL MG/DL
RBC #/AREA URNS AUTO: >100 /HPF (ref 0–5)
SP GR UR STRIP: 1.01 (ref 1–1.03)
URINE REFLEX TO CULTURE: YES
UROBILINOGEN UR STRIP-ACNC: 0.2 E.U./DL
WBC #/AREA URNS AUTO: ABNORMAL /HPF (ref 0–5)

## 2023-10-31 PROCEDURE — 87086 URINE CULTURE/COLONY COUNT: CPT

## 2023-10-31 PROCEDURE — 6370000000 HC RX 637 (ALT 250 FOR IP): Performed by: PHYSICIAN ASSISTANT

## 2023-10-31 PROCEDURE — 6360000002 HC RX W HCPCS: Performed by: PHYSICIAN ASSISTANT

## 2023-10-31 RX ORDER — KETOROLAC TROMETHAMINE 15 MG/ML
15 INJECTION, SOLUTION INTRAMUSCULAR; INTRAVENOUS ONCE
Status: COMPLETED | OUTPATIENT
Start: 2023-10-31 | End: 2023-10-31

## 2023-10-31 RX ORDER — ONDANSETRON 4 MG/1
4 TABLET, FILM COATED ORAL EVERY 8 HOURS PRN
Qty: 20 TABLET | Refills: 0 | Status: SHIPPED | OUTPATIENT
Start: 2023-10-31

## 2023-10-31 RX ORDER — TAMSULOSIN HYDROCHLORIDE 0.4 MG/1
0.4 CAPSULE ORAL ONCE
Status: COMPLETED | OUTPATIENT
Start: 2023-10-31 | End: 2023-10-31

## 2023-10-31 RX ORDER — ETODOLAC 400 MG/1
400 TABLET, FILM COATED ORAL 2 TIMES DAILY
Qty: 20 TABLET | Refills: 0 | Status: SHIPPED | OUTPATIENT
Start: 2023-10-31

## 2023-10-31 RX ORDER — TAMSULOSIN HYDROCHLORIDE 0.4 MG/1
0.4 CAPSULE ORAL DAILY
Qty: 5 CAPSULE | Refills: 0 | Status: SHIPPED | OUTPATIENT
Start: 2023-10-31 | End: 2023-11-05

## 2023-10-31 RX ORDER — OXYCODONE HYDROCHLORIDE AND ACETAMINOPHEN 5; 325 MG/1; MG/1
1 TABLET ORAL EVERY 6 HOURS PRN
Qty: 12 TABLET | Refills: 0 | Status: SHIPPED | OUTPATIENT
Start: 2023-10-31 | End: 2023-11-03

## 2023-10-31 RX ORDER — SULFAMETHOXAZOLE AND TRIMETHOPRIM 800; 160 MG/1; MG/1
1 TABLET ORAL ONCE
Status: COMPLETED | OUTPATIENT
Start: 2023-10-31 | End: 2023-10-31

## 2023-10-31 RX ORDER — SULFAMETHOXAZOLE AND TRIMETHOPRIM 800; 160 MG/1; MG/1
1 TABLET ORAL 2 TIMES DAILY
Qty: 20 TABLET | Refills: 0 | Status: SHIPPED | OUTPATIENT
Start: 2023-10-31 | End: 2023-11-10

## 2023-10-31 RX ADMIN — SULFAMETHOXAZOLE AND TRIMETHOPRIM 1 TABLET: 800; 160 TABLET ORAL at 00:54

## 2023-10-31 RX ADMIN — TAMSULOSIN HYDROCHLORIDE 0.4 MG: 0.4 CAPSULE ORAL at 00:54

## 2023-10-31 RX ADMIN — KETOROLAC TROMETHAMINE 15 MG: 15 INJECTION, SOLUTION INTRAMUSCULAR; INTRAVENOUS at 00:54

## 2023-10-31 ASSESSMENT — PAIN - FUNCTIONAL ASSESSMENT: PAIN_FUNCTIONAL_ASSESSMENT: NONE - DENIES PAIN

## 2023-10-31 ASSESSMENT — PAIN SCALES - GENERAL: PAINLEVEL_OUTOF10: 8

## 2023-10-31 ASSESSMENT — PAIN DESCRIPTION - LOCATION: LOCATION: ABDOMEN

## 2023-10-31 NOTE — DISCHARGE INSTRUCTIONS
Plenty of fluids follow-up with primary care and urology return to if any symptoms worsen or new symptoms develop do not drive or operate equipment taking pain medication including Percocet as may cause drug

## 2023-10-31 NOTE — ED PROVIDER NOTES
[phone]    Call in 1 day      Brandi Collins, [address] [facility] [address] [place] [address], [place]  [address] [facility]  [address]  [phone]    Call in 1 day      [facility]) ED  [address] [facility] [address] [address]  [phone]  Go to   If symptoms worsen      DISCHARGE MEDICATIONS:  New Prescriptions    ETODOLAC (LODINE) 400 MG TABLET    Take 1 tablet by mouth 2 times daily    ONDANSETRON (ZOFRAN) 4 MG TABLET    Take 1 tablet by mouth every 8 hours as needed for Nausea    OXYCODONE-ACETAMINOPHEN (PERCOCET) 5-325 MG PER TABLET    Take 1 tablet by mouth every 6 hours as needed for Pain for up to 3 days. Intended supply: 3 days. Take lowest dose possible to manage pain Max Daily Amount: 4 tablets    SULFAMETHOXAZOLE-TRIMETHOPRIM (BACTRIM DS) 800-160 MG PER TABLET    Take 1 tablet by mouth 2 times daily for 10 days    TAMSULOSIN (FLOMAX) 0.4 MG CAPSULE    Take 1 capsule by mouth daily for 5 doses     Controlled Substances Monitoring:          No data to display                (Please note that portions of this note were completed with a voice recognition program.  Efforts were made to edit the dictations but occasionally words are mis-transcribed.)    Wilber Roth PA-C (electronically signed)  Attending Emergency Physician    Supervising Attending Physician: Dr. Arthur García.       Wilber Roth PA-C  10/31/23 6438

## 2023-11-01 LAB — BACTERIA UR CULT: NORMAL

## 2023-11-10 ENCOUNTER — OFFICE VISIT (OUTPATIENT)
Dept: FAMILY MEDICINE CLINIC | Age: 68
End: 2023-11-10

## 2023-11-10 VITALS
SYSTOLIC BLOOD PRESSURE: 138 MMHG | HEART RATE: 67 BPM | WEIGHT: 139.99 LBS | RESPIRATION RATE: 12 BRPM | HEIGHT: 62 IN | DIASTOLIC BLOOD PRESSURE: 70 MMHG | OXYGEN SATURATION: 98 % | TEMPERATURE: 97.3 F | BODY MASS INDEX: 25.76 KG/M2

## 2023-11-10 DIAGNOSIS — L50.8 ACUTE URTICARIA: Primary | ICD-10-CM

## 2023-11-10 RX ORDER — METHYLPREDNISOLONE 4 MG/1
TABLET ORAL
Qty: 1 KIT | Refills: 0 | Status: SHIPPED | OUTPATIENT
Start: 2023-11-10 | End: 2023-11-16

## 2023-11-10 RX ORDER — CETIRIZINE HYDROCHLORIDE 10 MG/1
10 TABLET ORAL DAILY
Qty: 30 TABLET | Refills: 0 | Status: SHIPPED | OUTPATIENT
Start: 2023-11-10 | End: 2023-12-10

## 2023-11-10 ASSESSMENT — ENCOUNTER SYMPTOMS
TROUBLE SWALLOWING: 0
VOICE CHANGE: 0
EYE ITCHING: 0
EYE REDNESS: 0
COUGH: 0
DIARRHEA: 0
FACIAL SWELLING: 0
VOMITING: 0
SHORTNESS OF BREATH: 0
EYE DISCHARGE: 0
STRIDOR: 0
EYE PAIN: 0
ABDOMINAL PAIN: 0
WHEEZING: 0
SORE THROAT: 0
NAUSEA: 0
CHEST TIGHTNESS: 0

## 2023-12-01 RX ORDER — ERGOCALCIFEROL 1.25 MG/1
50000 CAPSULE ORAL WEEKLY
Qty: 12 CAPSULE | Refills: 0 | OUTPATIENT
Start: 2023-12-01

## 2023-12-01 NOTE — TELEPHONE ENCOUNTER
Future Appointments    Encounter Information    Provider Department Appt Notes   12/6/2023 Yefri Marie MD [facility] 3 month follow up     Past Visits    Date Provider Specialty Visit Type Primary Dx   11/10/2023 Audrey Vizcarra, HOLLY - CNP Family Medicine Office Visit Acute urticaria   09/06/2023 Yefri Marie MD Family Medicine Office Visit Hypothyroidism, unspecified type

## 2023-12-06 DIAGNOSIS — E03.9 HYPOTHYROIDISM, UNSPECIFIED TYPE: ICD-10-CM

## 2023-12-06 DIAGNOSIS — E55.9 VITAMIN D DEFICIENCY: ICD-10-CM

## 2023-12-06 DIAGNOSIS — E87.5 HYPERKALEMIA: ICD-10-CM

## 2023-12-06 LAB
ANION GAP SERPL CALCULATED.3IONS-SCNC: 11 MEQ/L (ref 9–15)
BUN SERPL-MCNC: 14 MG/DL (ref 8–23)
CALCIUM SERPL-MCNC: 9.4 MG/DL (ref 8.5–9.9)
CHLORIDE SERPL-SCNC: 106 MEQ/L (ref 95–107)
CO2 SERPL-SCNC: 23 MEQ/L (ref 20–31)
CREAT SERPL-MCNC: 0.64 MG/DL (ref 0.5–0.9)
GLUCOSE SERPL-MCNC: 98 MG/DL (ref 70–99)
POTASSIUM SERPL-SCNC: 4.9 MEQ/L (ref 3.4–4.9)
SODIUM SERPL-SCNC: 140 MEQ/L (ref 135–144)
TSH SERPL-MCNC: 0.6 UIU/ML (ref 0.44–3.86)

## 2023-12-07 LAB — VITAMIN D 25-HYDROXY: 32.3 NG/ML

## 2024-01-09 RX ORDER — LEVOTHYROXINE SODIUM 0.15 MG/1
TABLET ORAL
Qty: 90 TABLET | Refills: 0 | Status: SHIPPED | OUTPATIENT
Start: 2024-01-09

## 2024-01-17 ENCOUNTER — OFFICE VISIT (OUTPATIENT)
Dept: FAMILY MEDICINE CLINIC | Age: 69
End: 2024-01-17

## 2024-01-17 VITALS
TEMPERATURE: 97.4 F | DIASTOLIC BLOOD PRESSURE: 78 MMHG | BODY MASS INDEX: 26.87 KG/M2 | HEIGHT: 62 IN | OXYGEN SATURATION: 94 % | HEART RATE: 76 BPM | WEIGHT: 146 LBS | SYSTOLIC BLOOD PRESSURE: 120 MMHG

## 2024-01-17 DIAGNOSIS — E55.9 VITAMIN D DEFICIENCY: ICD-10-CM

## 2024-01-17 DIAGNOSIS — J06.9 VIRAL URI: ICD-10-CM

## 2024-01-17 DIAGNOSIS — E03.9 HYPOTHYROIDISM, UNSPECIFIED TYPE: Primary | ICD-10-CM

## 2024-01-17 DIAGNOSIS — F33.42 RECURRENT MAJOR DEPRESSIVE DISORDER, IN FULL REMISSION (HCC): ICD-10-CM

## 2024-01-17 PROBLEM — F33.1 MAJOR DEPRESSIVE DISORDER, RECURRENT, MODERATE (HCC): Status: RESOLVED | Noted: 2021-08-11 | Resolved: 2024-01-17

## 2024-01-17 RX ORDER — FLUTICASONE PROPIONATE 50 MCG
2 SPRAY, SUSPENSION (ML) NASAL DAILY
Qty: 16 G | Refills: 0 | Status: SHIPPED | OUTPATIENT
Start: 2024-01-17

## 2024-01-17 ASSESSMENT — ENCOUNTER SYMPTOMS
SINUS PRESSURE: 1
VOMITING: 0
COUGH: 0
DIARRHEA: 0
SORE THROAT: 0
TROUBLE SWALLOWING: 0

## 2024-02-09 ENCOUNTER — TELEPHONE (OUTPATIENT)
Dept: FAMILY MEDICINE CLINIC | Age: 69
End: 2024-02-09

## 2024-04-08 NOTE — H&P
Patient Name: Nehal Espinosa  : 1955  MRN: [id number]  DATE: 21      ENDOSCOPY  History and Physical    Procedure:    [] Diagnostic Colonoscopy       [x] Screening Colonoscopy  [] EGD      [] ERCP      [] EUS       [] Other    [x] Previous office notes/History and Physical reviewed from the patients chart. Please see EMR for further details of HPI. I have examined the patient's status immediately prior to the procedure and:      Indications/HPI:    []Abdominal Pain  []Cancer- GI/Lung  []Fhx of colon CA/polyps  []History of Polyps  []Prettys   []Melena  []Abnormal Imaging  []Dysphagia    []Persistent Pneumonia  []Anemia  []Food Impaction  []History of Polyps  []GI Bleed  []Pulmonary nodule/Mass  []Change in bowel habits []Heartburn/Reflux  []Rectal Bleed (BRBPR)  []Chest Pain - Non Cardiac []Heme (+) Stoo  l[]Ulcers  []Constipation  []Hemoptysis   []Varices  []Diarrhea  []Hypoxemia  []Nausea/Vomiting  []Screening   []Crohns/Colitis  []Other:   Anesthesia:   [x] MAC [] Moderate Sedation   [] General   [] None     ROS: 12 pt Review of Symptoms was negative unless mentioned above    Medications:   Prior to Admission medications    Medication Sig Start Date End Date Taking? Authorizing Provider   levothyroxine (SYNTHROID) 112 MCG tablet take 1 tablet by mouth once daily 21  Yes Sarah Perales MD   sertraline (ZOLOFT) 100 MG tablet 1 and 1/2 daily 21  Yes Sarah Perales MD   atorvastatin (LIPITOR) 10 MG tablet take 1 tablet by mouth once daily 21  Yes Sarah Perales MD   vitamin D (CHOLECALCIFEROL) 400 UNIT TABS tablet Take 400 Units by mouth 3 times daily. Yes Historical Provider, MD   vitamin E 1000 UNIT capsule Take 1,000 Units by mouth daily. Historical Provider, MD       Allergies:    Allergies   Allergen Reactions    Pcn [Penicillins] Hives    Tramadol Itching     Itch and nightmares    Hydrocodone-Acetaminophen Itching    Vicodin [Hydrocodone-Acetaminophen] Itching        History of allergic reaction to anesthesia:  No    Past Medical History:  Past Medical History:   Diagnosis Date    Depression     Hyperlipidemia     Hypothyroidism        Past Surgical History:  Past Surgical History:   Procedure Laterality Date    BACK SURGERY  2012       Social History:  Social History     Tobacco Use    Smoking status: Current Every Day Smoker     Packs/day: 0.20     Years: 10.00     Pack years: 2.00     Types: Cigarettes    Smokeless tobacco: Never Used   Vaping Use    Vaping Use: Never used   Substance Use Topics    Alcohol use: Yes     Comment: once weekly    Drug use: No       Vital Signs:   Vitals:    09/16/21 1205   BP: (!) 158/74   Pulse: 54   Resp: 16   Temp: 97.2 °F (36.2 °C)   SpO2: 99%        Physical Exam:  Cardiac:  [x]WNL  []Comments:  Pulmonary:  [x]WNL   []Comments:   Neuro/Mental Status:  [x]WNL  []Comments:  Abdominal:  [x]WNL    []Comments:  Other:   []WNL  []Comments:    Informed Consent:  The risks and benefits of the procedure have been discussed with either the patient or if they cannot consent, their representative. Assessment:  Patient examined and appropriate for planned sedation and procedure. Plan:  Proceed with planned sedation and procedure as above.     Lisbet Hernandez MD  12:52 PM done

## 2024-04-15 ENCOUNTER — TELEPHONE (OUTPATIENT)
Dept: FAMILY MEDICINE CLINIC | Age: 69
End: 2024-04-15

## 2024-04-29 ENCOUNTER — APPOINTMENT (OUTPATIENT)
Dept: CT IMAGING | Age: 69
End: 2024-04-29
Payer: MEDICARE

## 2024-04-29 ENCOUNTER — HOSPITAL ENCOUNTER (EMERGENCY)
Age: 69
Discharge: HOME | End: 2024-04-29
Attending: EMERGENCY MEDICINE
Payer: MEDICARE

## 2024-04-29 VITALS
HEIGHT: 62 IN | DIASTOLIC BLOOD PRESSURE: 86 MMHG | TEMPERATURE: 98.2 F | HEART RATE: 60 BPM | BODY MASS INDEX: 25.76 KG/M2 | OXYGEN SATURATION: 100 % | WEIGHT: 140 LBS | RESPIRATION RATE: 16 BRPM | SYSTOLIC BLOOD PRESSURE: 160 MMHG

## 2024-04-29 DIAGNOSIS — N20.0 KIDNEY STONE: Primary | ICD-10-CM

## 2024-04-29 LAB
ALBUMIN SERPL-MCNC: 4.3 G/DL (ref 3.5–4.6)
ALP SERPL-CCNC: 67 U/L (ref 40–130)
ALT SERPL-CCNC: 14 U/L (ref 0–33)
ANION GAP SERPL CALCULATED.3IONS-SCNC: 14 MEQ/L (ref 9–15)
AST SERPL-CCNC: 17 U/L (ref 0–35)
BACTERIA URNS QL MICRO: NEGATIVE /HPF
BASOPHILS # BLD: 0 K/UL (ref 0–0.2)
BASOPHILS NFR BLD: 0.4 %
BILIRUB SERPL-MCNC: 0.4 MG/DL (ref 0.2–0.7)
BILIRUB UR QL STRIP: NEGATIVE
BUN SERPL-MCNC: 15 MG/DL (ref 8–23)
CALCIUM SERPL-MCNC: 9.2 MG/DL (ref 8.5–9.9)
CHLORIDE SERPL-SCNC: 105 MEQ/L (ref 95–107)
CLARITY UR: CLEAR
CO2 SERPL-SCNC: 21 MEQ/L (ref 20–31)
COLOR UR: YELLOW
CREAT SERPL-MCNC: 0.77 MG/DL (ref 0.5–0.9)
EOSINOPHIL # BLD: 0 K/UL (ref 0–0.7)
EOSINOPHIL NFR BLD: 0.3 %
EPI CELLS #/AREA URNS AUTO: ABNORMAL /HPF (ref 0–5)
ERYTHROCYTE [DISTWIDTH] IN BLOOD BY AUTOMATED COUNT: 13.2 % (ref 11.5–14.5)
GLOBULIN SER CALC-MCNC: 2.6 G/DL (ref 2.3–3.5)
GLUCOSE SERPL-MCNC: 92 MG/DL (ref 70–99)
GLUCOSE UR STRIP-MCNC: NEGATIVE MG/DL
HCT VFR BLD AUTO: 42.5 % (ref 37–47)
HGB BLD-MCNC: 14.4 G/DL (ref 12–16)
HGB UR QL STRIP: ABNORMAL
HYALINE CASTS #/AREA URNS AUTO: ABNORMAL /HPF (ref 0–5)
KETONES UR STRIP-MCNC: 15 MG/DL
LEUKOCYTE ESTERASE UR QL STRIP: NEGATIVE
LYMPHOCYTES # BLD: 2.2 K/UL (ref 1–4.8)
LYMPHOCYTES NFR BLD: 28.5 %
MCH RBC QN AUTO: 32.4 PG (ref 27–31.3)
MCHC RBC AUTO-ENTMCNC: 33.9 % (ref 33–37)
MCV RBC AUTO: 95.7 FL (ref 79.4–94.8)
MONOCYTES # BLD: 0.4 K/UL (ref 0.2–0.8)
MONOCYTES NFR BLD: 5.6 %
NEUTROPHILS # BLD: 5.1 K/UL (ref 1.4–6.5)
NEUTS SEG NFR BLD: 64.9 %
NITRITE UR QL STRIP: NEGATIVE
PH UR STRIP: 6.5 [PH] (ref 5–9)
PLATELET # BLD AUTO: 251 K/UL (ref 130–400)
POC CREATININE WHOLE BLOOD: 0.9
POTASSIUM SERPL-SCNC: 4.2 MEQ/L (ref 3.4–4.9)
PROT SERPL-MCNC: 6.9 G/DL (ref 6.3–8)
PROT UR STRIP-MCNC: ABNORMAL MG/DL
RBC # BLD AUTO: 4.44 M/UL (ref 4.2–5.4)
RBC #/AREA URNS AUTO: ABNORMAL /HPF (ref 0–5)
SODIUM SERPL-SCNC: 140 MEQ/L (ref 135–144)
SP GR UR STRIP: 1.02 (ref 1–1.03)
URINE REFLEX TO CULTURE: ABNORMAL
UROBILINOGEN UR STRIP-ACNC: 0.2 E.U./DL
WBC # BLD AUTO: 7.8 K/UL (ref 4.8–10.8)
WBC #/AREA URNS AUTO: ABNORMAL /HPF (ref 0–5)

## 2024-04-29 PROCEDURE — 6360000002 HC RX W HCPCS: Performed by: EMERGENCY MEDICINE

## 2024-04-29 PROCEDURE — 80053 COMPREHEN METABOLIC PANEL: CPT

## 2024-04-29 PROCEDURE — 74177 CT ABD & PELVIS W/CONTRAST: CPT

## 2024-04-29 PROCEDURE — 99285 EMERGENCY DEPT VISIT HI MDM: CPT

## 2024-04-29 PROCEDURE — 85025 COMPLETE CBC W/AUTO DIFF WBC: CPT

## 2024-04-29 PROCEDURE — 81001 URINALYSIS AUTO W/SCOPE: CPT

## 2024-04-29 PROCEDURE — 96374 THER/PROPH/DIAG INJ IV PUSH: CPT

## 2024-04-29 PROCEDURE — 6360000004 HC RX CONTRAST MEDICATION: Performed by: EMERGENCY MEDICINE

## 2024-04-29 PROCEDURE — 36415 COLL VENOUS BLD VENIPUNCTURE: CPT

## 2024-04-29 RX ORDER — KETOROLAC TROMETHAMINE 30 MG/ML
30 INJECTION, SOLUTION INTRAMUSCULAR; INTRAVENOUS ONCE
Status: COMPLETED | OUTPATIENT
Start: 2024-04-29 | End: 2024-04-29

## 2024-04-29 RX ORDER — TAMSULOSIN HYDROCHLORIDE 0.4 MG/1
0.4 CAPSULE ORAL DAILY
Qty: 10 CAPSULE | Refills: 3 | Status: SHIPPED | OUTPATIENT
Start: 2024-04-29 | End: 2024-05-09

## 2024-04-29 RX ORDER — OXYCODONE HYDROCHLORIDE AND ACETAMINOPHEN 5; 325 MG/1; MG/1
1 TABLET ORAL EVERY 6 HOURS PRN
Qty: 12 TABLET | Refills: 0 | Status: SHIPPED | OUTPATIENT
Start: 2024-04-29 | End: 2024-05-02

## 2024-04-29 RX ORDER — KETOROLAC TROMETHAMINE 10 MG/1
10 TABLET, FILM COATED ORAL EVERY 6 HOURS PRN
Qty: 20 TABLET | Refills: 0 | Status: SHIPPED | OUTPATIENT
Start: 2024-04-29

## 2024-04-29 RX ORDER — ONDANSETRON 4 MG/1
4 TABLET, ORALLY DISINTEGRATING ORAL 3 TIMES DAILY PRN
Qty: 21 TABLET | Refills: 0 | Status: SHIPPED | OUTPATIENT
Start: 2024-04-29

## 2024-04-29 RX ADMIN — IOPAMIDOL 75 ML: 755 INJECTION, SOLUTION INTRAVENOUS at 15:37

## 2024-04-29 RX ADMIN — KETOROLAC TROMETHAMINE 30 MG: 30 INJECTION INTRAMUSCULAR; INTRAVENOUS at 16:04

## 2024-04-29 ASSESSMENT — PAIN DESCRIPTION - ORIENTATION
ORIENTATION: LEFT

## 2024-04-29 ASSESSMENT — PAIN SCALES - GENERAL
PAINLEVEL_OUTOF10: 8
PAINLEVEL_OUTOF10: 9
PAINLEVEL_OUTOF10: 1
PAINLEVEL_OUTOF10: 1

## 2024-04-29 ASSESSMENT — PAIN DESCRIPTION - LOCATION
LOCATION: FLANK

## 2024-04-29 ASSESSMENT — ENCOUNTER SYMPTOMS
EYE PAIN: 0
NAUSEA: 0
CHEST TIGHTNESS: 0
SHORTNESS OF BREATH: 0
ABDOMINAL PAIN: 0
SORE THROAT: 0
VOMITING: 0

## 2024-04-29 ASSESSMENT — PAIN DESCRIPTION - DESCRIPTORS: DESCRIPTORS: ACHING

## 2024-04-29 ASSESSMENT — LIFESTYLE VARIABLES
HOW MANY STANDARD DRINKS CONTAINING ALCOHOL DO YOU HAVE ON A TYPICAL DAY: 1 OR 2
HOW OFTEN DO YOU HAVE A DRINK CONTAINING ALCOHOL: MONTHLY OR LESS

## 2024-04-29 ASSESSMENT — PAIN - FUNCTIONAL ASSESSMENT
PAIN_FUNCTIONAL_ASSESSMENT: 0-10

## 2024-04-29 ASSESSMENT — PAIN DESCRIPTION - PAIN TYPE: TYPE: ACUTE PAIN

## 2024-04-29 NOTE — DISCHARGE INSTR - COC
Vent List:[id number]}    Rehab Therapies: {THERAPEUTIC INTERVENTION:9126358904}  Weight Bearing Status/Restrictions: { CC Weight Bearin}  Other Medical Equipment (for information only, NOT a DME order):  {EQUIPMENT:574406511}  Other Treatments: ***    Patient's personal belongings (please select all that are sent with patient):  {CHP DME Belongings:893721279}    RN SIGNATURE:  {Esignature:586026856}    CASE MANAGEMENT/SOCIAL WORK SECTION    Inpatient Status Date: ***    Readmission Risk Assessment Score:  Readmission Risk              Risk of Unplanned Readmission:  0           Discharging to Facility/ Agency   Name:   Address:  Phone:  Fax:    Dialysis Facility (if applicable)   Name:  Address:  Dialysis Schedule:  Phone:  Fax:    / signature: {Esignature:238851471}    PHYSICIAN SECTION    Prognosis: {Prognosis:0428891217}    Condition at Discharge: { Patient Condition:123966245}    Rehab Potential (if transferring to Rehab): {Prognosis:9066140140}    Recommended Labs or Other Treatments After Discharge: ***    Physician Certification: I certify the above information and transfer of Ascencion Alvarez  is necessary for the continuing treatment of the diagnosis listed and that she requires {Admit to Appropriate Level of Care:39135} for {GREATER/LESS:422383383} 30 days.     Update Admission H&P: {CHP DME Changes in HandP:581605002}    PHYSICIAN SIGNATURE:  {Esignature:800256788}

## 2024-04-29 NOTE — ED TRIAGE NOTES
Patient arrived to ER by private vehicle with friend.   Patient c/o left flank pain and frequent urge to urinate.   Patient states symptoms started this morning.

## 2024-04-29 NOTE — ED NOTES
Returned. Complains of 8/10 pain to left flank.  Aware we are waiting on results. Call light within reach.  Family at bedside. Dr Wilson made aware of continued pain

## 2024-04-29 NOTE — ED NOTES
D/C instructions given. Aware the rx's were electronically sent to [facility] in [place],as well as times the next doses are due. Verbalized understanding. Denies any further complaints. States she feels so much better. Amb out with steady gait in no acute distress with family member

## 2024-04-29 NOTE — ED NOTES
A & Ox4. Skin pink warm and dry. Pt complains of left flank pain since this morning and frequency with urination. Gown and warm blanket given. States she has a huge history of kidney stones and has had to have them blasted before.

## 2024-04-29 NOTE — ED PROVIDER NOTES
[facility]  EMERGENCY DEPARTMENT ENCOUNTER      Pt Name: Ascencion Alvarez  MRN: [id number]  Birthdate 1955  Date of evaluation: 4/29/2024  Provider: Rolanda Wilson DO    CHIEF COMPLAINT       Chief Complaint   Patient presents with    Flank Pain    Urinary Frequency         HISTORY OF PRESENT ILLNESS   (Location/Symptom, Timing/Onset, Context/Setting, Quality, Duration, Modifying Factors, Severity)  Note limiting factors.   Ascencion Alvarez is a 69 y.o. female who presents to the emergency department .  Patient presents with severe left flank pain that started this morning.  She tried to drink as much water as possible but the pain was just too severe and she came to the ER.  Patient has had many kidney stones in the past.  Some burning with urination but no fevers or chills    HPI    Nursing Notes were reviewed.    REVIEW OF SYSTEMS    (2-9 systems for level 4, 10 or more for level 5)     Review of Systems   Constitutional:  Negative for activity change, appetite change and fatigue.   HENT:  Negative for congestion and sore throat.    Eyes:  Negative for pain and visual disturbance.   Respiratory:  Negative for chest tightness and shortness of breath.    Cardiovascular:  Negative for chest pain.   Gastrointestinal:  Negative for abdominal pain, nausea and vomiting.   Endocrine: Negative for polydipsia.   Genitourinary:  Positive for dysuria and flank pain. Negative for urgency.   Musculoskeletal:  Negative for gait problem and neck stiffness.   Skin:  Negative for rash.   Neurological:  Negative for weakness, light-headedness and headaches.   Psychiatric/Behavioral:  Negative for confusion and sleep disturbance.        Except as noted above the remainder of the review of systems was reviewed and negative.       PAST MEDICAL HISTORY     Past Medical History:   Diagnosis Date    Depression     Hyperlipidemia     Hypothyroidism          SURGICAL HISTORY       Past Surgical History:   Procedure

## 2024-04-30 LAB
PERFORMED ON: NORMAL
POC CREATININE: 0.9 MG/DL (ref 0.6–1.2)
POC SAMPLE TYPE: NORMAL

## 2024-05-30 ENCOUNTER — TELEPHONE (OUTPATIENT)
Dept: FAMILY MEDICINE CLINIC | Age: 69
End: 2024-05-30

## 2024-06-28 ENCOUNTER — TELEPHONE (OUTPATIENT)
Dept: FAMILY MEDICINE CLINIC | Age: 69
End: 2024-06-28

## 2024-07-10 ENCOUNTER — HOSPITAL ENCOUNTER (OUTPATIENT)
Dept: WOMENS IMAGING | Age: 69
Discharge: HOME | End: 2024-07-12
Payer: MEDICARE

## 2024-07-10 DIAGNOSIS — Z12.31 ENCOUNTER FOR SCREENING MAMMOGRAM FOR MALIGNANT NEOPLASM OF BREAST: ICD-10-CM

## 2024-07-10 PROCEDURE — 77063 BREAST TOMOSYNTHESIS BI: CPT

## 2024-07-16 ENCOUNTER — TELEMEDICINE (OUTPATIENT)
Dept: FAMILY MEDICINE CLINIC | Age: 69
End: 2024-07-16
Payer: MEDICARE

## 2024-07-16 DIAGNOSIS — Z00.00 MEDICARE ANNUAL WELLNESS VISIT, SUBSEQUENT: Primary | ICD-10-CM

## 2024-07-16 PROCEDURE — G0439 PPPS, SUBSEQ VISIT: HCPCS | Performed by: NURSE PRACTITIONER

## 2024-07-16 PROCEDURE — 1123F ACP DISCUSS/DSCN MKR DOCD: CPT | Performed by: NURSE PRACTITIONER

## 2024-07-16 PROCEDURE — 3017F COLORECTAL CA SCREEN DOC REV: CPT | Performed by: NURSE PRACTITIONER

## 2024-07-16 SDOH — ECONOMIC STABILITY: HOUSING INSECURITY
IN THE LAST 12 MONTHS, WAS THERE A TIME WHEN YOU DID NOT HAVE A STEADY PLACE TO SLEEP OR SLEPT IN A SHELTER (INCLUDING NOW)?: NO

## 2024-07-16 SDOH — ECONOMIC STABILITY: FOOD INSECURITY: WITHIN THE PAST 12 MONTHS, THE FOOD YOU BOUGHT JUST DIDN'T LAST AND YOU DIDN'T HAVE MONEY TO GET MORE.: NEVER TRUE

## 2024-07-16 SDOH — ECONOMIC STABILITY: FOOD INSECURITY: WITHIN THE PAST 12 MONTHS, YOU WORRIED THAT YOUR FOOD WOULD RUN OUT BEFORE YOU GOT MONEY TO BUY MORE.: NEVER TRUE

## 2024-07-16 SDOH — ECONOMIC STABILITY: INCOME INSECURITY: HOW HARD IS IT FOR YOU TO PAY FOR THE VERY BASICS LIKE FOOD, HOUSING, MEDICAL CARE, AND HEATING?: NOT HARD AT ALL

## 2024-07-16 ASSESSMENT — PATIENT HEALTH QUESTIONNAIRE - PHQ9
8. MOVING OR SPEAKING SO SLOWLY THAT OTHER PEOPLE COULD HAVE NOTICED. OR THE OPPOSITE, BEING SO FIGETY OR RESTLESS THAT YOU HAVE BEEN MOVING AROUND A LOT MORE THAN USUAL: NOT AT ALL
5. POOR APPETITE OR OVEREATING: NOT AT ALL
3. TROUBLE FALLING OR STAYING ASLEEP: NOT AT ALL
SUM OF ALL RESPONSES TO PHQ QUESTIONS 1-9: 0
SUM OF ALL RESPONSES TO PHQ9 QUESTIONS 1 & 2: 0
10. IF YOU CHECKED OFF ANY PROBLEMS, HOW DIFFICULT HAVE THESE PROBLEMS MADE IT FOR YOU TO DO YOUR WORK, TAKE CARE OF THINGS AT HOME, OR GET ALONG WITH OTHER PEOPLE: NOT DIFFICULT AT ALL
2. FEELING DOWN, DEPRESSED OR HOPELESS: NOT AT ALL
SUM OF ALL RESPONSES TO PHQ QUESTIONS 1-9: 0
7. TROUBLE CONCENTRATING ON THINGS, SUCH AS READING THE NEWSPAPER OR WATCHING TELEVISION: NOT AT ALL
4. FEELING TIRED OR HAVING LITTLE ENERGY: NOT AT ALL
6. FEELING BAD ABOUT YOURSELF - OR THAT YOU ARE A FAILURE OR HAVE LET YOURSELF OR YOUR FAMILY DOWN: NOT AT ALL
9. THOUGHTS THAT YOU WOULD BE BETTER OFF DEAD, OR OF HURTING YOURSELF: NOT AT ALL
SUM OF ALL RESPONSES TO PHQ QUESTIONS 1-9: 0
SUM OF ALL RESPONSES TO PHQ QUESTIONS 1-9: 0
1. LITTLE INTEREST OR PLEASURE IN DOING THINGS: NOT AT ALL

## 2024-07-16 ASSESSMENT — LIFESTYLE VARIABLES
HOW MANY STANDARD DRINKS CONTAINING ALCOHOL DO YOU HAVE ON A TYPICAL DAY: PATIENT DOES NOT DRINK
HOW OFTEN DO YOU HAVE A DRINK CONTAINING ALCOHOL: NEVER

## 2024-07-16 NOTE — PROGRESS NOTES
Medicare Annual Wellness Visit    Ascencion Alvarez is here for Medicare AWV    Assessment & Plan   Medicare annual wellness visit, subsequent    Recommendations for Preventive Services Due: see orders and patient instructions/AVS.  Recommended screening schedule for the next 5-10 years is provided to the patient in written form: see Patient Instructions/AVS.     Return in 1 year (on 7/16/2025) for Medicare AWV.     Subjective       Patient's complete Health Risk Assessment and screening values have been reviewed and are found in Flowsheets. The following problems were reviewed today and where indicated follow up appointments were made and/or referrals ordered.    Positive Risk Factor Screenings with Interventions:                Inactivity:  On average, how many days per week do you engage in moderate to strenuous exercise (like a brisk walk)?: 2 days (!) Abnormal  On average, how many minutes do you engage in exercise at this level?: 30 min  Interventions - Inactivity:  Patient declined any further interventions or treatment    Poor Eating Habits/Diet:  Do you eat balanced/healthy meals regularly?: (!) No  Interventions:  Patient declines any further evaluation or treatment     Dentist Screen:  Have you seen the dentist within the past year?: (!) No    Intervention:  Advised to schedule with their dentist     Vision Screen:  Do you have difficulty driving, watching TV, or doing any of your daily activities because of your eyesight?: No  Have you had an eye exam within the past year?: (!) No    Interventions:   Patient encouraged to make appointment with their eye specialist       Tobacco Use:    Tobacco Use      Smoking status: Every Day        Packs/day: 0.50        Years: 0.5 packs/day for 63.5 years (28.8 ttl pk-yrs)        Types: Cigarettes        Start date: 1971      Smokeless tobacco: Never     Interventions:  Patient declined any further intervention or treatment                      Objective

## 2024-07-16 NOTE — PATIENT INSTRUCTIONS
eye disorders.  A preventive dental visit is recommended every 6 months.  Try to get at least 150 minutes of exercise per week or 10,000 steps per day on a pedometer .  Order or download the FREE \"Exercise & Physical Activity: Your Everyday Guide\" from [employer]. Call 1-437.614.4025 or search [employer] online.  You need 6046-5811 mg of calcium and 4818-5217 IU of vitamin D per day. It is possible to meet your calcium requirement with diet alone, but a vitamin D supplement is usually necessary to meet this goal.  When exposed to the sun, use a sunscreen that protects against both UVA and UVB radiation with an SPF of 30 or greater. Reapply every 2 to 3 hours or after sweating, drying off with a towel, or swimming.  Always wear a seat belt when traveling in a car. Always wear a helmet when riding a bicycle or motorcycle.

## 2024-07-18 DIAGNOSIS — E55.9 VITAMIN D DEFICIENCY: ICD-10-CM

## 2024-07-18 DIAGNOSIS — E03.9 HYPOTHYROIDISM, UNSPECIFIED TYPE: ICD-10-CM

## 2024-07-18 LAB
ALBUMIN SERPL-MCNC: 4 G/DL (ref 3.5–4.6)
ALP SERPL-CCNC: 60 U/L (ref 40–130)
ALT SERPL-CCNC: 15 U/L (ref 0–33)
ANION GAP SERPL CALCULATED.3IONS-SCNC: 10 MEQ/L (ref 9–15)
AST SERPL-CCNC: 15 U/L (ref 0–35)
BASOPHILS # BLD: 0 K/UL (ref 0–0.2)
BASOPHILS NFR BLD: 0.2 %
BILIRUB SERPL-MCNC: 0.3 MG/DL (ref 0.2–0.7)
BUN SERPL-MCNC: 16 MG/DL (ref 8–23)
CALCIUM SERPL-MCNC: 9.1 MG/DL (ref 8.5–9.9)
CHLORIDE SERPL-SCNC: 105 MEQ/L (ref 95–107)
CHOLEST SERPL-MCNC: 187 MG/DL (ref 0–199)
CO2 SERPL-SCNC: 25 MEQ/L (ref 20–31)
CREAT SERPL-MCNC: 0.7 MG/DL (ref 0.5–0.9)
EOSINOPHIL # BLD: 0 K/UL (ref 0–0.7)
EOSINOPHIL NFR BLD: 0.4 %
ERYTHROCYTE [DISTWIDTH] IN BLOOD BY AUTOMATED COUNT: 12.9 % (ref 11.5–14.5)
GLOBULIN SER CALC-MCNC: 2.2 G/DL (ref 2.3–3.5)
GLUCOSE SERPL-MCNC: 92 MG/DL (ref 70–99)
HCT VFR BLD AUTO: 42.1 % (ref 37–47)
HDLC SERPL-MCNC: 47 MG/DL (ref 40–59)
HGB BLD-MCNC: 14.3 G/DL (ref 12–16)
LDLC SERPL CALC-MCNC: 120 MG/DL (ref 0–129)
LYMPHOCYTES # BLD: 2.2 K/UL (ref 1–4.8)
LYMPHOCYTES NFR BLD: 24.9 %
MCH RBC QN AUTO: 33.1 PG (ref 27–31.3)
MCHC RBC AUTO-ENTMCNC: 34 % (ref 33–37)
MCV RBC AUTO: 97.5 FL (ref 79.4–94.8)
MONOCYTES # BLD: 0.6 K/UL (ref 0.2–0.8)
MONOCYTES NFR BLD: 6.1 %
NEUTROPHILS # BLD: 6.1 K/UL (ref 1.4–6.5)
NEUTS SEG NFR BLD: 68 %
PLATELET # BLD AUTO: 227 K/UL (ref 130–400)
POTASSIUM SERPL-SCNC: 5 MEQ/L (ref 3.4–4.9)
PROT SERPL-MCNC: 6.2 G/DL (ref 6.3–8)
RBC # BLD AUTO: 4.32 M/UL (ref 4.2–5.4)
SODIUM SERPL-SCNC: 140 MEQ/L (ref 135–144)
TRIGL SERPL-MCNC: 100 MG/DL (ref 0–150)
WBC # BLD AUTO: 9 K/UL (ref 4.8–10.8)

## 2024-07-19 LAB — VITAMIN D 25-HYDROXY: 20.9 NG/ML (ref 30–100)

## 2024-07-22 ENCOUNTER — OFFICE VISIT (OUTPATIENT)
Dept: FAMILY MEDICINE CLINIC | Age: 69
End: 2024-07-22
Payer: MEDICARE

## 2024-07-22 VITALS
OXYGEN SATURATION: 94 % | SYSTOLIC BLOOD PRESSURE: 120 MMHG | TEMPERATURE: 97.6 F | HEIGHT: 62 IN | BODY MASS INDEX: 24.84 KG/M2 | WEIGHT: 135 LBS | HEART RATE: 52 BPM | DIASTOLIC BLOOD PRESSURE: 78 MMHG

## 2024-07-22 DIAGNOSIS — F41.9 ANXIETY AND DEPRESSION: ICD-10-CM

## 2024-07-22 DIAGNOSIS — E55.9 VITAMIN D DEFICIENCY: ICD-10-CM

## 2024-07-22 DIAGNOSIS — E78.2 MIXED HYPERLIPIDEMIA: ICD-10-CM

## 2024-07-22 DIAGNOSIS — E03.9 HYPOTHYROIDISM, UNSPECIFIED TYPE: Primary | ICD-10-CM

## 2024-07-22 DIAGNOSIS — F32.A ANXIETY AND DEPRESSION: ICD-10-CM

## 2024-07-22 DIAGNOSIS — E03.9 HYPOTHYROIDISM, UNSPECIFIED TYPE: ICD-10-CM

## 2024-07-22 LAB — TSH SERPL-MCNC: 65.77 UIU/ML (ref 0.44–3.86)

## 2024-07-22 PROCEDURE — G8399 PT W/DXA RESULTS DOCUMENT: HCPCS | Performed by: FAMILY MEDICINE

## 2024-07-22 PROCEDURE — 4004F PT TOBACCO SCREEN RCVD TLK: CPT | Performed by: FAMILY MEDICINE

## 2024-07-22 PROCEDURE — 1090F PRES/ABSN URINE INCON ASSESS: CPT | Performed by: FAMILY MEDICINE

## 2024-07-22 PROCEDURE — 99214 OFFICE O/P EST MOD 30 MIN: CPT | Performed by: FAMILY MEDICINE

## 2024-07-22 PROCEDURE — 1123F ACP DISCUSS/DSCN MKR DOCD: CPT | Performed by: FAMILY MEDICINE

## 2024-07-22 PROCEDURE — G8420 CALC BMI NORM PARAMETERS: HCPCS | Performed by: FAMILY MEDICINE

## 2024-07-22 PROCEDURE — 3017F COLORECTAL CA SCREEN DOC REV: CPT | Performed by: FAMILY MEDICINE

## 2024-07-22 PROCEDURE — G8427 DOCREV CUR MEDS BY ELIG CLIN: HCPCS | Performed by: FAMILY MEDICINE

## 2024-07-22 RX ORDER — LEVOTHYROXINE SODIUM 0.15 MG/1
150 TABLET ORAL DAILY
Qty: 90 TABLET | Refills: 0 | Status: CANCELLED | OUTPATIENT
Start: 2024-07-22

## 2024-07-22 RX ORDER — SERTRALINE HYDROCHLORIDE 25 MG/1
25 TABLET, FILM COATED ORAL DAILY
Qty: 90 TABLET | Refills: 0 | Status: SHIPPED | OUTPATIENT
Start: 2024-07-22

## 2024-07-22 RX ORDER — ERGOCALCIFEROL 1.25 MG/1
50000 CAPSULE ORAL WEEKLY
Qty: 12 CAPSULE | Refills: 0 | Status: SHIPPED | OUTPATIENT
Start: 2024-07-22

## 2024-07-22 RX ORDER — ATORVASTATIN CALCIUM 10 MG/1
10 TABLET, FILM COATED ORAL DAILY
Qty: 90 TABLET | Refills: 3 | Status: SHIPPED | OUTPATIENT
Start: 2024-07-22

## 2024-07-22 ASSESSMENT — ENCOUNTER SYMPTOMS
VOMITING: 0
DIARRHEA: 0
COUGH: 0

## 2024-07-22 NOTE — PROGRESS NOTES
Subjective:      Patient ID: Ascencion Alvarez is a 69 y.o. female    Hyperlipidemia  The current episode started more than 1 year ago. The problem is controlled. Pertinent negatives include no chest pain. Current antihyperlipidemic treatment includes statins. The current treatment provides moderate improvement of lipids.   Thyroid Problem  Presents for follow-up visit. Patient reports no diarrhea. Her past medical history is significant for hyperlipidemia.   Mental Health Problem  This is a chronic problem.   The onset of the illness is precipitated by emotional stress.     Here in follow up for lipids and hypothyroidism and vitamin d deficiency and blood work.  No otc vitamin d being taken.  Has been having more anxiety dealing with son who has gone thru rough divorce recently.  Now living with her.   Wondering about need to go back on zoloft which she has had in past.   No missed doses of medications.  Weight down 10 pounds since last time thru stresses.    Review of Systems   Constitutional:  Negative for chills and fever.   Respiratory:  Negative for cough.    Cardiovascular:  Negative for chest pain.   Gastrointestinal:  Negative for diarrhea and vomiting.   Skin:  Negative for rash.   Neurological:  Negative for weakness.   Psychiatric/Behavioral:  Positive for sleep disturbance. Negative for suicidal ideas.      Reviewed allergy, medical, social, surgical, family and med list changes and updated   Files     Social History     Socioeconomic History    Marital status:    Occupational History    Occupation: loading and recieving     Employer: [employer]   Tobacco Use    Smoking status: Every Day     Current packs/day: 0.50     Average packs/day: 0.5 packs/day for 63.6 years (28.8 ttl pk-yrs)     Types: Cigarettes     Start date: 1971    Smokeless tobacco: Never   Vaping Use    Vaping Use: Never used   Substance and Sexual Activity    Alcohol use: Not Currently    Drug use: No     Social Determinants of

## 2024-07-30 ENCOUNTER — TELEPHONE (OUTPATIENT)
Dept: FAMILY MEDICINE CLINIC | Age: 69
End: 2024-07-30

## 2024-07-30 ENCOUNTER — TELEMEDICINE (OUTPATIENT)
Dept: FAMILY MEDICINE CLINIC | Age: 69
End: 2024-07-30

## 2024-07-30 DIAGNOSIS — Z03.89 NO DIAGNOSIS ON AXIS I: Primary | ICD-10-CM

## 2024-07-30 NOTE — TELEPHONE ENCOUNTER
Left voicemail, Per Dr. Marie needs to schedule office visit due to not able to connect for VV Visit.  Patient was sent link, connected, but Dr. Marie could not hear her.

## 2024-07-30 NOTE — PROGRESS NOTES
Subjective:      Patient ID: Ascencion Alvarez is a 69 y.o. female    HPI     Review of Systems  Reviewed allergy, medical, social, surgical, family and med list changes and updated   Files   Ascencion Alvarez, was evaluated through a synchronous (real-time) audio-video encounter. The patient (and/or guardian if applicable) is aware that this is a billable service, which includes applicable co-pays. This virtual visit was conducted with patient's (and/or legal guardian's) consent. Patient identification was verified, and a caregiver was present when appropriate.  The patient was located at Home: [address] [place] [id number]  The provider was located at Facility (Login Dept): [facility]  [address] [facility] [address] [phone]  Confirm you are appropriately licensed, registered, or certified to deliver care in the [facility] where the patient is located as indicated above. If you are not or unsure, please re-schedule the visit: Yes, I confirm.   Consults     Total time spent on this encounter: Not billed by time    --Yefri Marie MD on 7/30/2024 at 2:22 PM    An electronic signature was used to authenticate this note.    Social History     Socioeconomic History    Marital status:      Spouse name: None    Number of children: None    Years of education: None    Highest education level: None   Occupational History    Occupation: loading and recieving     Employer: [employer]   Tobacco Use    Smoking status: Every Day     Current packs/day: 0.50     Average packs/day: 0.5 packs/day for 63.6 years (28.8 ttl pk-yrs)     Types: Cigarettes     Start date: 1971    Smokeless tobacco: Never   Vaping Use    Vaping Use: Never used   Substance and Sexual Activity    Alcohol use: Not Currently    Drug use: No     Social Determinants of Health     Financial Resource Strain: Low Risk  (7/16/2024)    Overall Financial Resource Strain (CARDIA)     Difficulty of Paying Living

## 2024-11-28 ENCOUNTER — OFFICE VISIT (OUTPATIENT)
Dept: URGENT CARE | Age: 69
End: 2024-11-28
Payer: MEDICARE

## 2024-11-28 VITALS
TEMPERATURE: 98.2 F | BODY MASS INDEX: 23.21 KG/M2 | WEIGHT: 131 LBS | HEIGHT: 63 IN | RESPIRATION RATE: 20 BRPM | SYSTOLIC BLOOD PRESSURE: 154 MMHG | HEART RATE: 57 BPM | OXYGEN SATURATION: 98 % | DIASTOLIC BLOOD PRESSURE: 88 MMHG

## 2024-11-28 DIAGNOSIS — J40 BRONCHITIS: ICD-10-CM

## 2024-11-28 DIAGNOSIS — R05.9 COUGH, UNSPECIFIED TYPE: ICD-10-CM

## 2024-11-28 DIAGNOSIS — U07.1 COVID-19: Primary | ICD-10-CM

## 2024-11-28 LAB — POC SARS-COV-2 AG BINAX: NORMAL

## 2024-11-28 RX ORDER — DEXAMETHASONE 6 MG/1
6 TABLET ORAL DAILY
Qty: 5 TABLET | Refills: 0 | Status: SHIPPED | OUTPATIENT
Start: 2024-11-28 | End: 2024-12-03

## 2024-11-28 RX ORDER — AZITHROMYCIN 250 MG/1
TABLET, FILM COATED ORAL
Qty: 6 TABLET | Refills: 0 | Status: SHIPPED | OUTPATIENT
Start: 2024-11-28 | End: 2024-12-03

## 2024-11-28 RX ORDER — BENZONATATE 200 MG/1
200 CAPSULE ORAL 3 TIMES DAILY PRN
Qty: 42 CAPSULE | Refills: 0 | Status: SHIPPED | OUTPATIENT
Start: 2024-11-28 | End: 2024-12-28

## 2024-11-28 NOTE — PROGRESS NOTES
"Subjective   Patient ID: Bartolo Novoa is a 69 y.o. female. They present today with a chief complaint of Cough (Yellow sputum ), Headache, Fatigue, and Chills.    History of Present Illness  HPI    Pt presents to urgent care with c/o   productive cough, headache, chills, generally not feeling well for the past few days.  States she did a home covid test today that was positive. She is a smoker.  Pt denies CP, SOB, palpitations, fevers, abd pain, n/v/d, sick contacts, recent travel.        Past Medical History  Allergies as of 11/28/2024 - Reviewed 11/28/2024   Allergen Reaction Noted    Penicillins Hives 10/20/2011    Tramadol Itching 12/16/2019    Hydrocodone-acetaminophen Itching 09/14/2012       (Not in a hospital admission)       No past medical history on file.    No past surgical history on file.         Review of Systems  Review of Systems   Constitutional:  Positive for fatigue.   HENT:  Positive for congestion and sore throat.    Eyes: Negative.    Respiratory:  Positive for cough.    Cardiovascular:  Negative for chest pain and palpitations.   Gastrointestinal:  Positive for nausea.   Endocrine: Negative.    Genitourinary: Negative.    Musculoskeletal: Negative.    Skin: Negative.    Allergic/Immunologic: Negative.    Neurological:  Positive for headaches.   Hematological: Negative.    Psychiatric/Behavioral: Negative.     All other systems reviewed and are negative.                                 Objective    Vitals:    11/28/24 1323   BP: 154/88   Pulse: 57   Resp: 20   Temp: 36.8 °C (98.2 °F)   SpO2: 98%   Weight: 59.4 kg (131 lb)   Height: 1.6 m (5' 3\")     No LMP recorded.    Physical Exam  Vitals and nursing note reviewed.   Constitutional:       General: She is not in acute distress.     Appearance: Normal appearance. She is not ill-appearing or toxic-appearing.   HENT:      Head: Atraumatic.      Right Ear: Tympanic membrane, ear canal and external ear normal.      Left Ear: Tympanic " membrane, ear canal and external ear normal.      Nose: Congestion present.      Mouth/Throat:      Mouth: Mucous membranes are moist.      Pharynx: Oropharynx is clear. No oropharyngeal exudate or posterior oropharyngeal erythema.   Eyes:      Extraocular Movements: Extraocular movements intact.      Conjunctiva/sclera: Conjunctivae normal.      Pupils: Pupils are equal, round, and reactive to light.   Cardiovascular:      Rate and Rhythm: Normal rate and regular rhythm.      Pulses: Normal pulses.      Heart sounds: Normal heart sounds.   Pulmonary:      Effort: Pulmonary effort is normal.      Breath sounds: Normal breath sounds.   Abdominal:      General: Abdomen is flat. Bowel sounds are normal.      Palpations: Abdomen is soft.      Tenderness: There is no abdominal tenderness.   Musculoskeletal:         General: Normal range of motion.      Cervical back: Normal range of motion and neck supple. No tenderness.   Skin:     General: Skin is warm and dry.      Capillary Refill: Capillary refill takes less than 2 seconds.   Neurological:      General: No focal deficit present.      Mental Status: She is alert and oriented to person, place, and time.   Psychiatric:         Mood and Affect: Mood normal.         Behavior: Behavior normal.         Thought Content: Thought content normal.         Procedures    Point of Care Test & Imaging Results from this visit  Results for orders placed or performed in visit on 11/28/24   POCT Covid-19 Rapid Antigen   Result Value Ref Range    POC GARRICK-COV-2 AG  Presumptive negative test for SARS-CoV-2 (no antigen detected)     Presumptive negative test for SARS-CoV-2 (no antigen detected)      No results found.    Diagnostic study results (if any) were reviewed by BENSON Spencer.    Assessment/Plan   Allergies, medications, history, and pertinent labs/EKGs/Imaging reviewed by BENSON Spencer.     Medical Decision Making  Urgent Care Course: Patient presents to  the UC with fever, rhinorrhea, cough.  Patient is afebrile, hemodynamically stable.  Patient denies neck pain, cp, sob, ab pain, dysuria, diarrhea.  Patient without any nuchal rigidity.  Low suspicion for meningitis.  Patient is positive for Covid.   Patient educated about covid.  Patient given prescription for decadron, tessalon. Will also start pt on zpack given tobacco use, suspect Covid associated URI/bronchitis  Discussed risks vs benefits of Paxlovid, and pt declines Rx at this time. Patient  given covid warning signs and will f/u with pcp.  Patient advised to quarantine for 5 days.  Patient and family understand plan.   Prior external records reviewed: Outpatient records  Reviewed pertinent findings from resulted labs:  Covid - positive at home  Independent Hx provided by: Patient  Med Rx considered but ultimately not given: Steroids   Social determinant that may affects healthcare: Pharmacy closed  Pt's case/impression summarized and discussed with: Patient  Likely Dx given clinical picture: Covid  Although not an exhaustive list of Differential Diagnosis (though considered), patient's HPI, PE, and other findings are not suggestive of: RSV, flu, pneumonia, pneumothorax, URI, pertussis  Patient at time of discharge was clinically well-appearing and HDS for outpatient management. The patient and/or family was given the opportunity to ask questions prior to discharge, understood my verbal discussion of the plans for treatment, expected course, indications to return to ED, and the need for timely follow up as directed.    Condition: Stable  Disposition: Discharge     Orders and Diagnoses  Diagnoses and all orders for this visit:  COVID-19  -     dexAMETHasone (Decadron) 6 mg tablet; Take 1 tablet (6 mg) by mouth once daily for 5 days.  Cough, unspecified type  -     POCT Covid-19 Rapid Antigen  -     benzonatate (Tessalon) 200 mg capsule; Take 1 capsule (200 mg) by mouth 3 times a day as needed for cough. Do not  crush or chew.  Bronchitis  -     azithromycin (Zithromax) 250 mg tablet; Take 2 tabs (500 mg) by mouth today, than 1 daily for 4 days.      Medical Admin Record      Patient disposition: Home    Electronically signed by BENSON Spencer  9:17 AM

## 2024-11-29 ASSESSMENT — ENCOUNTER SYMPTOMS
PSYCHIATRIC NEGATIVE: 1
NAUSEA: 1
ENDOCRINE NEGATIVE: 1
HEMATOLOGIC/LYMPHATIC NEGATIVE: 1
PALPITATIONS: 0
HEADACHES: 1
MUSCULOSKELETAL NEGATIVE: 1
FATIGUE: 1
EYES NEGATIVE: 1
ALLERGIC/IMMUNOLOGIC NEGATIVE: 1
SORE THROAT: 1
COUGH: 1

## 2025-01-23 ENCOUNTER — HOSPITAL ENCOUNTER (EMERGENCY)
Age: 70
Discharge: HOME | End: 2025-01-23
Attending: EMERGENCY MEDICINE
Payer: MEDICARE

## 2025-01-23 VITALS
BODY MASS INDEX: 24.84 KG/M2 | OXYGEN SATURATION: 97 % | RESPIRATION RATE: 18 BRPM | HEART RATE: 63 BPM | TEMPERATURE: 98.1 F | WEIGHT: 135 LBS | DIASTOLIC BLOOD PRESSURE: 87 MMHG | HEIGHT: 62 IN | SYSTOLIC BLOOD PRESSURE: 165 MMHG

## 2025-01-23 DIAGNOSIS — E78.5 HYPERLIPIDEMIA, UNSPECIFIED HYPERLIPIDEMIA TYPE: ICD-10-CM

## 2025-01-23 DIAGNOSIS — R25.2 LEG CRAMPS: Primary | ICD-10-CM

## 2025-01-23 DIAGNOSIS — E03.9 HYPOTHYROIDISM, UNSPECIFIED TYPE: ICD-10-CM

## 2025-01-23 DIAGNOSIS — Z76.0 ENCOUNTER FOR MEDICATION REFILL: ICD-10-CM

## 2025-01-23 LAB
ALBUMIN SERPL-MCNC: 4.3 G/DL (ref 3.5–4.6)
ALP SERPL-CCNC: 52 U/L (ref 40–130)
ALT SERPL-CCNC: 67 U/L (ref 0–33)
ANION GAP SERPL CALCULATED.3IONS-SCNC: 8 MEQ/L (ref 9–15)
AST SERPL-CCNC: 55 U/L (ref 0–35)
BASOPHILS # BLD: 0 K/UL (ref 0–0.2)
BASOPHILS NFR BLD: 0.3 %
BILIRUB SERPL-MCNC: 0.4 MG/DL (ref 0.2–0.7)
BUN SERPL-MCNC: 14 MG/DL (ref 8–23)
CALCIUM SERPL-MCNC: 9 MG/DL (ref 8.5–9.9)
CHLORIDE SERPL-SCNC: 103 MEQ/L (ref 95–107)
CHOLEST SERPL-MCNC: 268 MG/DL (ref 0–199)
CO2 SERPL-SCNC: 26 MEQ/L (ref 20–31)
CREAT SERPL-MCNC: 0.88 MG/DL (ref 0.5–0.9)
EOSINOPHIL # BLD: 0.1 K/UL (ref 0–0.7)
EOSINOPHIL NFR BLD: 0.9 %
ERYTHROCYTE [DISTWIDTH] IN BLOOD BY AUTOMATED COUNT: 14.3 % (ref 11.5–14.5)
GLOBULIN SER CALC-MCNC: 2.4 G/DL (ref 2.3–3.5)
GLUCOSE SERPL-MCNC: 87 MG/DL (ref 70–99)
HCT VFR BLD AUTO: 39 % (ref 37–47)
HDLC SERPL-MCNC: 69 MG/DL (ref 40–59)
HGB BLD-MCNC: 13.5 G/DL (ref 12–16)
LDLC SERPL CALC-MCNC: 182 MG/DL (ref 0–129)
LYMPHOCYTES # BLD: 2.2 K/UL (ref 1–4.8)
LYMPHOCYTES NFR BLD: 32.1 %
MAGNESIUM SERPL-MCNC: 2.2 MG/DL (ref 1.7–2.4)
MCH RBC QN AUTO: 34.5 PG (ref 27–31.3)
MCHC RBC AUTO-ENTMCNC: 34.6 % (ref 33–37)
MCV RBC AUTO: 99.7 FL (ref 79.4–94.8)
MONOCYTES # BLD: 0.4 K/UL (ref 0.2–0.8)
MONOCYTES NFR BLD: 5.9 %
NEUTROPHILS # BLD: 4.2 K/UL (ref 1.4–6.5)
NEUTS SEG NFR BLD: 60.5 %
PLATELET # BLD AUTO: 201 K/UL (ref 130–400)
POTASSIUM SERPL-SCNC: 4.2 MEQ/L (ref 3.4–4.9)
PROT SERPL-MCNC: 6.7 G/DL (ref 6.3–8)
RBC # BLD AUTO: 3.91 M/UL (ref 4.2–5.4)
SODIUM SERPL-SCNC: 137 MEQ/L (ref 135–144)
T4 FREE SERPL-MCNC: <0.03 NG/DL (ref 0.84–1.68)
TRIGL SERPL-MCNC: 85 MG/DL (ref 0–150)
TSH REFLEX: 139.2 UIU/ML (ref 0.44–3.86)
WBC # BLD AUTO: 7 K/UL (ref 4.8–10.8)

## 2025-01-23 PROCEDURE — 84439 ASSAY OF FREE THYROXINE: CPT

## 2025-01-23 PROCEDURE — 85025 COMPLETE CBC W/AUTO DIFF WBC: CPT

## 2025-01-23 PROCEDURE — 99283 EMERGENCY DEPT VISIT LOW MDM: CPT

## 2025-01-23 PROCEDURE — 84443 ASSAY THYROID STIM HORMONE: CPT

## 2025-01-23 PROCEDURE — 80061 LIPID PANEL: CPT

## 2025-01-23 PROCEDURE — 83735 ASSAY OF MAGNESIUM: CPT

## 2025-01-23 PROCEDURE — 80053 COMPREHEN METABOLIC PANEL: CPT

## 2025-01-23 RX ORDER — ATORVASTATIN CALCIUM 10 MG/1
10 TABLET, FILM COATED ORAL DAILY
Qty: 90 TABLET | Refills: 0 | Status: SHIPPED | OUTPATIENT
Start: 2025-01-23

## 2025-01-23 RX ORDER — MAGNESIUM OXIDE 400 MG/1
400 TABLET ORAL NIGHTLY
Qty: 30 TABLET | Refills: 1 | Status: SHIPPED | OUTPATIENT
Start: 2025-01-23

## 2025-01-23 RX ORDER — LEVOTHYROXINE SODIUM 150 UG/1
150 TABLET ORAL DAILY
Qty: 90 TABLET | Refills: 0 | Status: SHIPPED | OUTPATIENT
Start: 2025-01-23

## 2025-01-23 ASSESSMENT — ENCOUNTER SYMPTOMS
EYE PAIN: 0
SORE THROAT: 0
SHORTNESS OF BREATH: 0
ABDOMINAL PAIN: 0
NAUSEA: 0
CHEST TIGHTNESS: 0
VOMITING: 0

## 2025-01-23 ASSESSMENT — LIFESTYLE VARIABLES
HOW OFTEN DO YOU HAVE A DRINK CONTAINING ALCOHOL: NEVER
HOW MANY STANDARD DRINKS CONTAINING ALCOHOL DO YOU HAVE ON A TYPICAL DAY: PATIENT DOES NOT DRINK

## 2025-01-23 ASSESSMENT — PAIN - FUNCTIONAL ASSESSMENT: PAIN_FUNCTIONAL_ASSESSMENT: NONE - DENIES PAIN

## 2025-01-23 NOTE — ED PROVIDER NOTES
Procedures        FINAL IMPRESSION      1. Leg cramps    2. Encounter for medication refill    3. Hypothyroidism, unspecified type    4. Hyperlipidemia, unspecified hyperlipidemia type          DISPOSITION/PLAN   DISPOSITION Decision To Discharge 01/23/2025 02:36:58 PM    PATIENT REFERRED TO:  Inez Alcantara MD  [address] [facility] [address]  [phone]      monday 1/27/25 at 11:45 AM      DISCHARGE MEDICATIONS:  New Prescriptions    ATORVASTATIN (LIPITOR) 10 MG TABLET    Take 1 tablet by mouth daily    LEVOTHYROXINE (SYNTHROID) 150 MCG TABLET    Take 1 tablet by mouth daily    MAGNESIUM OXIDE (MAG-OX) 400 MG TABLET    Take 1 tablet by mouth at bedtime     Controlled Substances Monitoring:          No data to display                (Please note that portions of this note were completed with a voice recognition program.  Efforts were made to edit the dictations but occasionally words are mis-transcribed.)    Rolanda Wilson DO (electronically signed)  Attending Emergency Physician            Rolanda Wilson DO  01/23/25 [id number]

## 2025-01-23 NOTE — ED TRIAGE NOTES
Patient arrived via private car due to having leg cramps at night x 2 weeks with pins and needle feeling to bilateral feet 24/7 for 2 weeks. Only pain when the cramps are there at night.

## 2025-01-23 NOTE — CARE COORDINATION
With the patient's permission, this nurse scheduled her an appointment to see Dr Alcantara for 1/27/25 @ 8136. The patient states that this is acceptable to her and information on the appointment was printed out and given to her.

## 2025-01-27 ENCOUNTER — OFFICE VISIT (OUTPATIENT)
Age: 70
End: 2025-01-27
Payer: MEDICARE

## 2025-01-27 VITALS
SYSTOLIC BLOOD PRESSURE: 138 MMHG | BODY MASS INDEX: 24.84 KG/M2 | HEART RATE: 66 BPM | HEIGHT: 62 IN | OXYGEN SATURATION: 99 % | WEIGHT: 135 LBS | DIASTOLIC BLOOD PRESSURE: 84 MMHG | TEMPERATURE: 97.5 F

## 2025-01-27 DIAGNOSIS — R79.89 LOW VITAMIN D LEVEL: Primary | ICD-10-CM

## 2025-01-27 DIAGNOSIS — I25.10 ASCVD (ARTERIOSCLEROTIC CARDIOVASCULAR DISEASE): ICD-10-CM

## 2025-01-27 DIAGNOSIS — R20.2 NUMBNESS AND TINGLING OF LEG: ICD-10-CM

## 2025-01-27 DIAGNOSIS — E03.9 HYPOTHYROIDISM, UNSPECIFIED TYPE: ICD-10-CM

## 2025-01-27 DIAGNOSIS — K76.0 FATTY LIVER: ICD-10-CM

## 2025-01-27 DIAGNOSIS — R20.0 NUMBNESS AND TINGLING OF LEG: ICD-10-CM

## 2025-01-27 DIAGNOSIS — Z76.89 ENCOUNTER TO ESTABLISH CARE WITH NEW DOCTOR: ICD-10-CM

## 2025-01-27 DIAGNOSIS — R74.01 TRANSAMINITIS: ICD-10-CM

## 2025-01-27 PROCEDURE — G8427 DOCREV CUR MEDS BY ELIG CLIN: HCPCS | Performed by: STUDENT IN AN ORGANIZED HEALTH CARE EDUCATION/TRAINING PROGRAM

## 2025-01-27 PROCEDURE — 3017F COLORECTAL CA SCREEN DOC REV: CPT | Performed by: STUDENT IN AN ORGANIZED HEALTH CARE EDUCATION/TRAINING PROGRAM

## 2025-01-27 PROCEDURE — G8399 PT W/DXA RESULTS DOCUMENT: HCPCS | Performed by: STUDENT IN AN ORGANIZED HEALTH CARE EDUCATION/TRAINING PROGRAM

## 2025-01-27 PROCEDURE — 1160F RVW MEDS BY RX/DR IN RCRD: CPT | Performed by: STUDENT IN AN ORGANIZED HEALTH CARE EDUCATION/TRAINING PROGRAM

## 2025-01-27 PROCEDURE — G8420 CALC BMI NORM PARAMETERS: HCPCS | Performed by: STUDENT IN AN ORGANIZED HEALTH CARE EDUCATION/TRAINING PROGRAM

## 2025-01-27 PROCEDURE — 4004F PT TOBACCO SCREEN RCVD TLK: CPT | Performed by: STUDENT IN AN ORGANIZED HEALTH CARE EDUCATION/TRAINING PROGRAM

## 2025-01-27 PROCEDURE — 1123F ACP DISCUSS/DSCN MKR DOCD: CPT | Performed by: STUDENT IN AN ORGANIZED HEALTH CARE EDUCATION/TRAINING PROGRAM

## 2025-01-27 PROCEDURE — G2211 COMPLEX E/M VISIT ADD ON: HCPCS | Performed by: STUDENT IN AN ORGANIZED HEALTH CARE EDUCATION/TRAINING PROGRAM

## 2025-01-27 PROCEDURE — 99214 OFFICE O/P EST MOD 30 MIN: CPT | Performed by: STUDENT IN AN ORGANIZED HEALTH CARE EDUCATION/TRAINING PROGRAM

## 2025-01-27 PROCEDURE — 1090F PRES/ABSN URINE INCON ASSESS: CPT | Performed by: STUDENT IN AN ORGANIZED HEALTH CARE EDUCATION/TRAINING PROGRAM

## 2025-01-27 PROCEDURE — 1159F MED LIST DOCD IN RCRD: CPT | Performed by: STUDENT IN AN ORGANIZED HEALTH CARE EDUCATION/TRAINING PROGRAM

## 2025-01-27 RX ORDER — ATORVASTATIN CALCIUM 40 MG/1
40 TABLET, FILM COATED ORAL DAILY
Qty: 90 TABLET | Refills: 1 | Status: SHIPPED | OUTPATIENT
Start: 2025-01-27

## 2025-01-27 SDOH — ECONOMIC STABILITY: FOOD INSECURITY: WITHIN THE PAST 12 MONTHS, THE FOOD YOU BOUGHT JUST DIDN'T LAST AND YOU DIDN'T HAVE MONEY TO GET MORE.: NEVER TRUE

## 2025-01-27 SDOH — ECONOMIC STABILITY: FOOD INSECURITY: WITHIN THE PAST 12 MONTHS, YOU WORRIED THAT YOUR FOOD WOULD RUN OUT BEFORE YOU GOT MONEY TO BUY MORE.: NEVER TRUE

## 2025-01-27 ASSESSMENT — PATIENT HEALTH QUESTIONNAIRE - PHQ9
2. FEELING DOWN, DEPRESSED OR HOPELESS: NOT AT ALL
SUM OF ALL RESPONSES TO PHQ QUESTIONS 1-9: 0
SUM OF ALL RESPONSES TO PHQ QUESTIONS 1-9: 0
6. FEELING BAD ABOUT YOURSELF - OR THAT YOU ARE A FAILURE OR HAVE LET YOURSELF OR YOUR FAMILY DOWN: NOT AT ALL
5. POOR APPETITE OR OVEREATING: NOT AT ALL
1. LITTLE INTEREST OR PLEASURE IN DOING THINGS: NOT AT ALL
SUM OF ALL RESPONSES TO PHQ QUESTIONS 1-9: 0
9. THOUGHTS THAT YOU WOULD BE BETTER OFF DEAD, OR OF HURTING YOURSELF: NOT AT ALL
SUM OF ALL RESPONSES TO PHQ QUESTIONS 1-9: 0
10. IF YOU CHECKED OFF ANY PROBLEMS, HOW DIFFICULT HAVE THESE PROBLEMS MADE IT FOR YOU TO DO YOUR WORK, TAKE CARE OF THINGS AT HOME, OR GET ALONG WITH OTHER PEOPLE: NOT DIFFICULT AT ALL
SUM OF ALL RESPONSES TO PHQ9 QUESTIONS 1 & 2: 0
8. MOVING OR SPEAKING SO SLOWLY THAT OTHER PEOPLE COULD HAVE NOTICED. OR THE OPPOSITE, BEING SO FIGETY OR RESTLESS THAT YOU HAVE BEEN MOVING AROUND A LOT MORE THAN USUAL: NOT AT ALL
3. TROUBLE FALLING OR STAYING ASLEEP: NOT AT ALL
4. FEELING TIRED OR HAVING LITTLE ENERGY: NOT AT ALL
7. TROUBLE CONCENTRATING ON THINGS, SUCH AS READING THE NEWSPAPER OR WATCHING TELEVISION: NOT AT ALL

## 2025-01-27 NOTE — PROGRESS NOTES
Subjective  Ascencion Alvarez, 69 y.o. female presents today with:  Chief Complaint   Patient presents with    Follow-Up from Hospital     F/u from hospital visit. No concerns. Would like to est. Care        She is here to establish care.    She recently went to the ER 4 days ago for leg cramps.  They were occurring for the past 2 weeks.  She had blood work ordered which were unremarkable except for hyperlipidemia and hypothyroidism.  TSH was 139.  T4 was less than 0.03.  No anemia.  She was prescribed Synthroid and Mag-Ox, then discharged.    Today, she reports that her leg cramps have improved.  She did not take her Synthroid for 3 months.  No issues with her Synthroid 150 mcg daily or Lipitor 10 mg daily -restarted by the ER.. No constipation or leg swelling.     Eats fast foods every other night.     Review of Systems   Constitutional:  Negative for fever.   Endocrine: Negative for cold intolerance and heat intolerance.       Past Medical History:   Diagnosis Date    Depression     Hyperlipidemia     Hypothyroidism      Past Surgical History:   Procedure Laterality Date    BACK SURGERY  01/01/2012    COLONOSCOPY N/A 09/16/2021    COLORECTAL CANCER SCREENING, NOT HIGH RISK with polypectomy performed by Dayana Huertas MD at [facility]     Current Outpatient Medications   Medication Sig Dispense Refill    atorvastatin (LIPITOR) 40 MG tablet Take 1 tablet by mouth daily 90 tablet 1    magnesium oxide (MAG-OX) 400 MG tablet Take 1 tablet by mouth at bedtime 30 tablet 1    levothyroxine (SYNTHROID) 150 MCG tablet Take 1 tablet by mouth daily 90 tablet 0    vitamin D (ERGOCALCIFEROL) 1.25 MG (36852 UT) CAPS capsule Take 1 capsule by mouth once a week 12 capsule 0    ketorolac (TORADOL) 10 MG tablet Take 1 tablet by mouth every 6 hours as needed for Pain 20 tablet 0    ondansetron (ZOFRAN-ODT) 4 MG disintegrating tablet Take 1 tablet by mouth 3 times daily as needed for Nausea or Vomiting 21 tablet 0

## 2025-01-27 NOTE — PATIENT INSTRUCTIONS
Patient Education        Nonalcoholic Fatty Liver Disease (NAFLD): Care Instructions  Overview     Nonalcoholic fatty liver disease (NAFLD) is the term for conditions in which fat builds up in the liver in people who drink little or no alcohol. Of those who have this condition:  Most have a type called nonalcoholic fatty liver (NAFL). These people have fat in their liver, but it doesn't seem to cause damage.  Some have a more serious type called nonalcoholic steatohepatitis (CALABRESE). The buildup of fat in the liver causes inflammation and damage. Over time, this can cause scarring of the liver, which can lead to cirrhosis and liver failure.  Experts don't really know what causes fat buildup in the liver, but being obese seems to increase the risk. NAFLD is often linked to a group of health problems called metabolic syndrome. This includes obesity, high cholesterol, high blood pressure, and insulin resistance or diabetes. Most people who have NAFLD also have one or more of these health problems. But some have none of these other conditions.  NAFLD usually doesn't cause symptoms. It can be diagnosed with blood tests and imaging tests, such as a CT scan, an ultrasound, or an MRI. In some cases, a liver biopsy may be done.  Treatment focuses on managing related conditions like diabetes and making lifestyle changes, including losing weight if needed, eating a healthy diet, and being more active. A doctor may prescribe medicines for related conditions or to help with weight loss. Weight-loss surgery may be an option for people who have obesity.  Follow-up care is a key part of your treatment and safety. Be sure to make and go to all appointments, and call your doctor if you are having problems. It's also a good idea to know your test results and keep a list of the medicines you take.  How can you care for yourself at home?  Lose weight if you need to. Losing even 5% of your weight can make a difference to your health. A

## 2025-03-04 DIAGNOSIS — R79.89 LOW VITAMIN D LEVEL: ICD-10-CM

## 2025-03-04 DIAGNOSIS — R20.2 NUMBNESS AND TINGLING OF LEG: ICD-10-CM

## 2025-03-04 DIAGNOSIS — E03.9 HYPOTHYROIDISM, UNSPECIFIED TYPE: ICD-10-CM

## 2025-03-04 DIAGNOSIS — I25.10 ASCVD (ARTERIOSCLEROTIC CARDIOVASCULAR DISEASE): ICD-10-CM

## 2025-03-04 DIAGNOSIS — R74.01 TRANSAMINITIS: ICD-10-CM

## 2025-03-04 DIAGNOSIS — R20.0 NUMBNESS AND TINGLING OF LEG: ICD-10-CM

## 2025-03-04 LAB
ALBUMIN SERPL-MCNC: 4.3 G/DL (ref 3.5–4.6)
ALP SERPL-CCNC: 62 U/L (ref 40–130)
ALT SERPL-CCNC: 21 U/L (ref 0–33)
AST SERPL-CCNC: 17 U/L (ref 0–35)
BILIRUB DIRECT SERPL-MCNC: <0.2 MG/DL (ref 0–0.4)
BILIRUB INDIRECT SERPL-MCNC: NORMAL MG/DL (ref 0–0.6)
BILIRUB SERPL-MCNC: 0.4 MG/DL (ref 0.2–0.7)
CHOLEST SERPL-MCNC: 107 MG/DL (ref 0–199)
HDLC SERPL-MCNC: 39 MG/DL (ref 40–59)
LDL CHOLESTEROL: 56 MG/DL (ref 0–129)
PROT SERPL-MCNC: 6.6 G/DL (ref 6.3–8)
TRIGLYCERIDE, FASTING: 62 MG/DL (ref 0–150)
TSH REFLEX: 0.84 UIU/ML (ref 0.44–3.86)

## 2025-03-05 LAB
FOLATE: 10.3 NG/ML (ref 4.8–24.2)
VITAMIN B-12: 302 PG/ML (ref 232–1245)
VITAMIN D 25-HYDROXY: 22.1 NG/ML (ref 30–100)

## 2025-03-10 ENCOUNTER — RESULTS FOLLOW-UP (OUTPATIENT)
Age: 70
End: 2025-03-10

## 2025-03-11 ENCOUNTER — OFFICE VISIT (OUTPATIENT)
Age: 70
End: 2025-03-11
Payer: MEDICARE

## 2025-03-11 VITALS
OXYGEN SATURATION: 99 % | SYSTOLIC BLOOD PRESSURE: 132 MMHG | RESPIRATION RATE: 20 BRPM | HEART RATE: 65 BPM | DIASTOLIC BLOOD PRESSURE: 70 MMHG | BODY MASS INDEX: 24.84 KG/M2 | TEMPERATURE: 97.5 F | HEIGHT: 62 IN | WEIGHT: 135 LBS

## 2025-03-11 DIAGNOSIS — G89.29 CHRONIC LEFT SHOULDER PAIN: ICD-10-CM

## 2025-03-11 DIAGNOSIS — I25.10 ASCVD (ARTERIOSCLEROTIC CARDIOVASCULAR DISEASE): ICD-10-CM

## 2025-03-11 DIAGNOSIS — E03.9 HYPOTHYROIDISM, UNSPECIFIED TYPE: ICD-10-CM

## 2025-03-11 DIAGNOSIS — R79.89 LOW VITAMIN D LEVEL: ICD-10-CM

## 2025-03-11 DIAGNOSIS — K76.0 FATTY LIVER: Primary | ICD-10-CM

## 2025-03-11 DIAGNOSIS — Z11.59 NEED FOR HEPATITIS C SCREENING TEST: ICD-10-CM

## 2025-03-11 DIAGNOSIS — M25.512 CHRONIC LEFT SHOULDER PAIN: ICD-10-CM

## 2025-03-11 PROCEDURE — G2211 COMPLEX E/M VISIT ADD ON: HCPCS | Performed by: STUDENT IN AN ORGANIZED HEALTH CARE EDUCATION/TRAINING PROGRAM

## 2025-03-11 PROCEDURE — 99214 OFFICE O/P EST MOD 30 MIN: CPT | Performed by: STUDENT IN AN ORGANIZED HEALTH CARE EDUCATION/TRAINING PROGRAM

## 2025-03-11 PROCEDURE — G8399 PT W/DXA RESULTS DOCUMENT: HCPCS | Performed by: STUDENT IN AN ORGANIZED HEALTH CARE EDUCATION/TRAINING PROGRAM

## 2025-03-11 PROCEDURE — 1090F PRES/ABSN URINE INCON ASSESS: CPT | Performed by: STUDENT IN AN ORGANIZED HEALTH CARE EDUCATION/TRAINING PROGRAM

## 2025-03-11 PROCEDURE — 1123F ACP DISCUSS/DSCN MKR DOCD: CPT | Performed by: STUDENT IN AN ORGANIZED HEALTH CARE EDUCATION/TRAINING PROGRAM

## 2025-03-11 PROCEDURE — 3017F COLORECTAL CA SCREEN DOC REV: CPT | Performed by: STUDENT IN AN ORGANIZED HEALTH CARE EDUCATION/TRAINING PROGRAM

## 2025-03-11 PROCEDURE — G8420 CALC BMI NORM PARAMETERS: HCPCS | Performed by: STUDENT IN AN ORGANIZED HEALTH CARE EDUCATION/TRAINING PROGRAM

## 2025-03-11 PROCEDURE — 1159F MED LIST DOCD IN RCRD: CPT | Performed by: STUDENT IN AN ORGANIZED HEALTH CARE EDUCATION/TRAINING PROGRAM

## 2025-03-11 PROCEDURE — 1160F RVW MEDS BY RX/DR IN RCRD: CPT | Performed by: STUDENT IN AN ORGANIZED HEALTH CARE EDUCATION/TRAINING PROGRAM

## 2025-03-11 PROCEDURE — 4004F PT TOBACCO SCREEN RCVD TLK: CPT | Performed by: STUDENT IN AN ORGANIZED HEALTH CARE EDUCATION/TRAINING PROGRAM

## 2025-03-11 PROCEDURE — G8427 DOCREV CUR MEDS BY ELIG CLIN: HCPCS | Performed by: STUDENT IN AN ORGANIZED HEALTH CARE EDUCATION/TRAINING PROGRAM

## 2025-03-11 RX ORDER — MELOXICAM 7.5 MG/1
7.5 TABLET ORAL DAILY PRN
Qty: 30 TABLET | Refills: 0 | Status: SHIPPED | OUTPATIENT
Start: 2025-03-11

## 2025-03-11 NOTE — PROGRESS NOTES
Subjective  Ascencion Alvarez, 69 y.o. female presents today with:  Chief Complaint   Patient presents with    Follow-up     Patient presents today for a 6 weeks follow up for post for fatty liver and hypothyroidism on 1/27.25. She said that she is feeling good.         Hypothyroidism  Pt is on synthroid 150 mcg daily. Denies any temperature intolerances or changes in energy. Doing well with medication.    Also has a history of fatty liver and is on Lipitor 40 mg daily.  History of Present Illness  She reports a significant improvement in her symptoms, particularly the discomfort in her feet described as a pins-and-needles sensation, following the initiation of Synthroid therapy.    She has been tolerating Lipitor well. She has made dietary modifications, including eliminating fried chicken from her diet and attempting to reduce her chocolate consumption. She reports no abdominal pain.    She is currently on a regimen of 50,000 units of vitamin D per week.    She has a history of two left shoulder fractures. She recently experienced an exacerbation of her left shoulder pain after using it to force open a swollen door. The pain is described as an ache that intensifies to a throbbing sensation when she inadvertently lies on her left side during sleep. She has been managing the pain with Tylenol but is uncertain about the application of heat or ice. She has previously undergone physical therapy for her shoulder.    Review of Systems   Musculoskeletal:         L shoulder pain       Past Medical History:   Diagnosis Date    Depression     Hyperlipidemia     Hypothyroidism      Past Surgical History:   Procedure Laterality Date    BACK SURGERY  01/01/2012    COLONOSCOPY N/A 09/16/2021    COLORECTAL CANCER SCREENING, NOT HIGH RISK with polypectomy performed by Dayana Huertas MD at [facility]     Current Outpatient Medications   Medication Sig Dispense Refill    meloxicam (MOBIC) 7.5 MG tablet Take 1 tablet by

## 2025-04-25 NOTE — TELEPHONE ENCOUNTER
Comments:     Last Office Visit (last PCP visit):   3/11/2025    Next Visit Date:  Future Appointments   Date Time Provider Department Center   7/11/2025 11:30 AM Inez Alcantara MD [facility]       **If hasn't been seen in over a year OR hasn't followed up according to last diabetes/ADHD visit, make appointment for patient before sending refill to provider.    Rx requested:  Requested Prescriptions     Pending Prescriptions Disp Refills    levothyroxine (SYNTHROID) 150 MCG tablet 90 tablet 0     Sig: Take 1 tablet by mouth daily

## 2025-04-28 RX ORDER — LEVOTHYROXINE SODIUM 150 UG/1
150 TABLET ORAL DAILY
Qty: 90 TABLET | Refills: 0 | Status: SHIPPED | OUTPATIENT
Start: 2025-04-28

## 2025-07-10 DIAGNOSIS — R79.89 LOW VITAMIN D LEVEL: ICD-10-CM

## 2025-07-10 DIAGNOSIS — Z11.59 NEED FOR HEPATITIS C SCREENING TEST: ICD-10-CM

## 2025-07-11 ENCOUNTER — OFFICE VISIT (OUTPATIENT)
Age: 70
End: 2025-07-11

## 2025-07-11 VITALS
TEMPERATURE: 97.6 F | HEART RATE: 74 BPM | HEIGHT: 62 IN | SYSTOLIC BLOOD PRESSURE: 116 MMHG | OXYGEN SATURATION: 98 % | RESPIRATION RATE: 14 BRPM | BODY MASS INDEX: 24.18 KG/M2 | WEIGHT: 131.4 LBS | DIASTOLIC BLOOD PRESSURE: 60 MMHG

## 2025-07-11 DIAGNOSIS — F51.01 PRIMARY INSOMNIA: ICD-10-CM

## 2025-07-11 DIAGNOSIS — F41.9 ANXIETY AND DEPRESSION: ICD-10-CM

## 2025-07-11 DIAGNOSIS — I25.10 ASCVD (ARTERIOSCLEROTIC CARDIOVASCULAR DISEASE): ICD-10-CM

## 2025-07-11 DIAGNOSIS — Z72.0 TOBACCO ABUSE: ICD-10-CM

## 2025-07-11 DIAGNOSIS — R79.89 LOW VITAMIN D LEVEL: ICD-10-CM

## 2025-07-11 DIAGNOSIS — L30.9 DERMATITIS: ICD-10-CM

## 2025-07-11 DIAGNOSIS — Z00.00 MEDICARE ANNUAL WELLNESS VISIT, SUBSEQUENT: Primary | ICD-10-CM

## 2025-07-11 DIAGNOSIS — E03.9 HYPOTHYROIDISM, UNSPECIFIED TYPE: ICD-10-CM

## 2025-07-11 DIAGNOSIS — Z12.2 SCREENING FOR LUNG CANCER: ICD-10-CM

## 2025-07-11 DIAGNOSIS — F32.A ANXIETY AND DEPRESSION: ICD-10-CM

## 2025-07-11 LAB
HEPATITIS C ANTIBODY: NONREACTIVE
VITAMIN D 25-HYDROXY: 22.4 NG/ML (ref 30–100)

## 2025-07-11 RX ORDER — TRAZODONE HYDROCHLORIDE 50 MG/1
50 TABLET ORAL NIGHTLY PRN
Qty: 30 TABLET | Refills: 0 | Status: SHIPPED | OUTPATIENT
Start: 2025-07-11

## 2025-07-11 RX ORDER — ERGOCALCIFEROL 1.25 MG/1
50000 CAPSULE, LIQUID FILLED ORAL WEEKLY
Qty: 12 CAPSULE | Refills: 0 | Status: SHIPPED | OUTPATIENT
Start: 2025-07-11

## 2025-07-11 RX ORDER — CLOBETASOL PROPIONATE 0.5 MG/G
CREAM TOPICAL
Qty: 60 G | Refills: 0 | Status: SHIPPED | OUTPATIENT
Start: 2025-07-11

## 2025-07-11 ASSESSMENT — PATIENT HEALTH QUESTIONNAIRE - PHQ9
SUM OF ALL RESPONSES TO PHQ QUESTIONS 1-9: 0
SUM OF ALL RESPONSES TO PHQ QUESTIONS 1-9: 0
4. FEELING TIRED OR HAVING LITTLE ENERGY: NOT AT ALL
9. THOUGHTS THAT YOU WOULD BE BETTER OFF DEAD, OR OF HURTING YOURSELF: NOT AT ALL
3. TROUBLE FALLING OR STAYING ASLEEP: NOT AT ALL
1. LITTLE INTEREST OR PLEASURE IN DOING THINGS: NOT AT ALL
SUM OF ALL RESPONSES TO PHQ QUESTIONS 1-9: 0
5. POOR APPETITE OR OVEREATING: NOT AT ALL
10. IF YOU CHECKED OFF ANY PROBLEMS, HOW DIFFICULT HAVE THESE PROBLEMS MADE IT FOR YOU TO DO YOUR WORK, TAKE CARE OF THINGS AT HOME, OR GET ALONG WITH OTHER PEOPLE: NOT DIFFICULT AT ALL
6. FEELING BAD ABOUT YOURSELF - OR THAT YOU ARE A FAILURE OR HAVE LET YOURSELF OR YOUR FAMILY DOWN: NOT AT ALL
SUM OF ALL RESPONSES TO PHQ QUESTIONS 1-9: 0
2. FEELING DOWN, DEPRESSED OR HOPELESS: NOT AT ALL
7. TROUBLE CONCENTRATING ON THINGS, SUCH AS READING THE NEWSPAPER OR WATCHING TELEVISION: NOT AT ALL
8. MOVING OR SPEAKING SO SLOWLY THAT OTHER PEOPLE COULD HAVE NOTICED. OR THE OPPOSITE, BEING SO FIGETY OR RESTLESS THAT YOU HAVE BEEN MOVING AROUND A LOT MORE THAN USUAL: NOT AT ALL

## 2025-07-11 NOTE — PROGRESS NOTES
Subjective  Ascencion Alvarez, 70 y.o. female presents today with:  Chief Complaint   Patient presents with    Follow-up     Patient presents today for a 4 months follow up for fatty liver on 3.11.25. she said that she is good.      Medicare AWV     Patient present today for an AWV.     Hypothyroidism  Pt is on synthroid 150 mcg daily. Denies any temperature intolerances or changes in energy. Doing well with medication.     Also has a history of fatty liver, she is on Lipitor 40 mg daily.    History of Present Illness  The patient presents for evaluation of a skin lesion, vitamin D deficiency, anxiety and depression, smoking, kidney stone, and health maintenance.    She reports a new skin lesion that resembles a small bubble. The lesion appeared spontaneously without any associated itching or pain. No new products have been used recently. The lesion first appeared in one location before spreading to another. She recalls working in her yard about a week prior to the onset of the lesion but does not believe it is related to poison ivy exposure. She has been applying an ointment to the area.    She is currently taking weekly vitamin D supplements. Blood work from 03/2025 showed low vitamin D levels, which have not significantly improved despite supplementation.    She is seeking medication to aid with sleep. She experiences uncontrolled anxiety, which she believes contributes to her insomnia. She is under significant stress due to her 45-year-old son's alcoholism and his recent release from rehab. She finds it difficult to fall asleep as she spends hours ruminating on various thoughts. She has made several lifestyle changes, including avoiding chicken, switching to a day shift at work, and abstaining from coffee for the past two months. She only drinks ice water now. She has previously taken Zoloft 100 mg, which she found beneficial.    She continues to smoke cigarettes and is not interested in quitting at this time. 
MD  7/11/2025  11:39 AM

## 2025-08-11 DIAGNOSIS — I25.10 ASCVD (ARTERIOSCLEROTIC CARDIOVASCULAR DISEASE): ICD-10-CM

## 2025-08-11 RX ORDER — ATORVASTATIN CALCIUM 40 MG/1
40 TABLET, FILM COATED ORAL DAILY
Qty: 90 TABLET | Refills: 1 | Status: SHIPPED | OUTPATIENT
Start: 2025-08-11

## 2025-08-11 RX ORDER — LEVOTHYROXINE SODIUM 150 UG/1
150 TABLET ORAL DAILY
Qty: 90 TABLET | Refills: 0 | Status: SHIPPED | OUTPATIENT
Start: 2025-08-11

## 2025-08-15 ENCOUNTER — OFFICE VISIT (OUTPATIENT)
Age: 70
End: 2025-08-15
Payer: MEDICARE

## 2025-08-15 VITALS
TEMPERATURE: 98.1 F | OXYGEN SATURATION: 98 % | BODY MASS INDEX: 24.48 KG/M2 | HEART RATE: 76 BPM | WEIGHT: 133 LBS | SYSTOLIC BLOOD PRESSURE: 126 MMHG | HEIGHT: 62 IN | DIASTOLIC BLOOD PRESSURE: 64 MMHG

## 2025-08-15 DIAGNOSIS — F32.A ANXIETY AND DEPRESSION: Primary | ICD-10-CM

## 2025-08-15 DIAGNOSIS — Z13.1 SCREENING FOR DIABETES MELLITUS: ICD-10-CM

## 2025-08-15 DIAGNOSIS — Z13.220 SCREENING FOR LIPID DISORDERS: ICD-10-CM

## 2025-08-15 DIAGNOSIS — E55.9 VITAMIN D DEFICIENCY: ICD-10-CM

## 2025-08-15 DIAGNOSIS — Z72.0 TOBACCO ABUSE: ICD-10-CM

## 2025-08-15 DIAGNOSIS — E03.9 HYPOTHYROIDISM, UNSPECIFIED TYPE: ICD-10-CM

## 2025-08-15 DIAGNOSIS — F51.01 PRIMARY INSOMNIA: ICD-10-CM

## 2025-08-15 DIAGNOSIS — R79.89 LOW VITAMIN D LEVEL: Primary | ICD-10-CM

## 2025-08-15 DIAGNOSIS — F41.9 ANXIETY AND DEPRESSION: Primary | ICD-10-CM

## 2025-08-15 PROCEDURE — 1123F ACP DISCUSS/DSCN MKR DOCD: CPT | Performed by: STUDENT IN AN ORGANIZED HEALTH CARE EDUCATION/TRAINING PROGRAM

## 2025-08-15 PROCEDURE — 1160F RVW MEDS BY RX/DR IN RCRD: CPT | Performed by: STUDENT IN AN ORGANIZED HEALTH CARE EDUCATION/TRAINING PROGRAM

## 2025-08-15 PROCEDURE — G2211 COMPLEX E/M VISIT ADD ON: HCPCS | Performed by: STUDENT IN AN ORGANIZED HEALTH CARE EDUCATION/TRAINING PROGRAM

## 2025-08-15 PROCEDURE — 99214 OFFICE O/P EST MOD 30 MIN: CPT | Performed by: STUDENT IN AN ORGANIZED HEALTH CARE EDUCATION/TRAINING PROGRAM

## 2025-08-15 PROCEDURE — 4004F PT TOBACCO SCREEN RCVD TLK: CPT | Performed by: STUDENT IN AN ORGANIZED HEALTH CARE EDUCATION/TRAINING PROGRAM

## 2025-08-15 PROCEDURE — G8399 PT W/DXA RESULTS DOCUMENT: HCPCS | Performed by: STUDENT IN AN ORGANIZED HEALTH CARE EDUCATION/TRAINING PROGRAM

## 2025-08-15 PROCEDURE — G8420 CALC BMI NORM PARAMETERS: HCPCS | Performed by: STUDENT IN AN ORGANIZED HEALTH CARE EDUCATION/TRAINING PROGRAM

## 2025-08-15 PROCEDURE — 1159F MED LIST DOCD IN RCRD: CPT | Performed by: STUDENT IN AN ORGANIZED HEALTH CARE EDUCATION/TRAINING PROGRAM

## 2025-08-15 PROCEDURE — 3017F COLORECTAL CA SCREEN DOC REV: CPT | Performed by: STUDENT IN AN ORGANIZED HEALTH CARE EDUCATION/TRAINING PROGRAM

## 2025-08-15 PROCEDURE — G8427 DOCREV CUR MEDS BY ELIG CLIN: HCPCS | Performed by: STUDENT IN AN ORGANIZED HEALTH CARE EDUCATION/TRAINING PROGRAM

## 2025-08-15 PROCEDURE — 1090F PRES/ABSN URINE INCON ASSESS: CPT | Performed by: STUDENT IN AN ORGANIZED HEALTH CARE EDUCATION/TRAINING PROGRAM

## 2025-08-15 RX ORDER — CHOLECALCIFEROL (VITAMIN D3) 1250 MCG
CAPSULE ORAL
COMMUNITY

## 2025-08-15 RX ORDER — TRAZODONE HYDROCHLORIDE 50 MG/1
50 TABLET ORAL NIGHTLY PRN
Qty: 90 TABLET | Refills: 0 | Status: SHIPPED | OUTPATIENT
Start: 2025-08-15

## 2025-08-16 LAB — VITAMIN D 25-HYDROXY: 51.2 NG/ML (ref 30–100)

## (undated) DEVICE — FORCEPS BX L240CM JAW DIA2.4MM ORNG L CAP W/ NDL DISP RAD

## (undated) DEVICE — Device: Brand: ENDO SMARTCAP

## (undated) DEVICE — ADAPTER FLSH PMP FLD MGMT GI IRRIG OFP 2 DISPOSABLE

## (undated) DEVICE — BRUSH ENDO CLN L90.5IN SHTH DIA1.7MM BRIST DIA5-7MM 2-6MM

## (undated) DEVICE — TUBE SET 96 MM 64 MM H2O PERISTALTIC STD AUX CHANNEL

## (undated) DEVICE — ENDO CARRY-ON PROCEDURE KIT: Brand: ENDO CARRY-ON PROCEDURE KIT

## (undated) DEVICE — TUBING, SUCTION, 1/4" X 10', STRAIGHT: Brand: MEDLINE